# Patient Record
Sex: FEMALE | Race: WHITE | URBAN - METROPOLITAN AREA
[De-identification: names, ages, dates, MRNs, and addresses within clinical notes are randomized per-mention and may not be internally consistent; named-entity substitution may affect disease eponyms.]

---

## 2019-03-21 ENCOUNTER — INPATIENT (INPATIENT)
Facility: HOSPITAL | Age: 84
LOS: 4 days | Discharge: HOME HEALTH | End: 2019-03-26
Attending: FAMILY MEDICINE | Admitting: FAMILY MEDICINE
Payer: MEDICARE

## 2019-03-21 VITALS
TEMPERATURE: 97 F | WEIGHT: 134.92 LBS | DIASTOLIC BLOOD PRESSURE: 83 MMHG | SYSTOLIC BLOOD PRESSURE: 154 MMHG | OXYGEN SATURATION: 95 % | RESPIRATION RATE: 22 BRPM | HEART RATE: 111 BPM

## 2019-03-21 LAB
ALBUMIN SERPL ELPH-MCNC: 3.8 G/DL — SIGNIFICANT CHANGE UP (ref 3.3–5)
ALP SERPL-CCNC: 71 U/L — SIGNIFICANT CHANGE UP (ref 40–120)
ALT FLD-CCNC: 19 U/L — SIGNIFICANT CHANGE UP (ref 12–78)
ANION GAP SERPL CALC-SCNC: 9 MMOL/L — SIGNIFICANT CHANGE UP (ref 5–17)
APPEARANCE UR: CLEAR — SIGNIFICANT CHANGE UP
APTT BLD: 39.8 SEC — HIGH (ref 27.5–36.3)
AST SERPL-CCNC: 22 U/L — SIGNIFICANT CHANGE UP (ref 15–37)
BACTERIA # UR AUTO: ABNORMAL
BASE EXCESS BLDV CALC-SCNC: 3.7 MMOL/L — HIGH (ref -2–2)
BASOPHILS # BLD AUTO: 0.03 K/UL — SIGNIFICANT CHANGE UP (ref 0–0.2)
BASOPHILS NFR BLD AUTO: 0.8 % — SIGNIFICANT CHANGE UP (ref 0–2)
BILIRUB SERPL-MCNC: 1.1 MG/DL — SIGNIFICANT CHANGE UP (ref 0.2–1.2)
BILIRUB UR-MCNC: NEGATIVE — SIGNIFICANT CHANGE UP
BLOOD GAS COMMENTS, VENOUS: SIGNIFICANT CHANGE UP
BUN SERPL-MCNC: 18 MG/DL — SIGNIFICANT CHANGE UP (ref 7–23)
CALCIUM SERPL-MCNC: 8.6 MG/DL — SIGNIFICANT CHANGE UP (ref 8.5–10.1)
CHLORIDE SERPL-SCNC: 103 MMOL/L — SIGNIFICANT CHANGE UP (ref 96–108)
CO2 SERPL-SCNC: 29 MMOL/L — SIGNIFICANT CHANGE UP (ref 22–31)
COLOR SPEC: YELLOW — SIGNIFICANT CHANGE UP
CREAT SERPL-MCNC: 0.66 MG/DL — SIGNIFICANT CHANGE UP (ref 0.5–1.3)
DIFF PNL FLD: ABNORMAL
EOSINOPHIL # BLD AUTO: 0.02 K/UL — SIGNIFICANT CHANGE UP (ref 0–0.5)
EOSINOPHIL NFR BLD AUTO: 0.5 % — SIGNIFICANT CHANGE UP (ref 0–6)
GLUCOSE SERPL-MCNC: 91 MG/DL — SIGNIFICANT CHANGE UP (ref 70–99)
GLUCOSE UR QL: NEGATIVE MG/DL — SIGNIFICANT CHANGE UP
HCO3 BLDV-SCNC: 30 MMOL/L — HIGH (ref 21–29)
HCT VFR BLD CALC: 42.8 % — SIGNIFICANT CHANGE UP (ref 34.5–45)
HGB BLD-MCNC: 13.4 G/DL — SIGNIFICANT CHANGE UP (ref 11.5–15.5)
IMM GRANULOCYTES NFR BLD AUTO: 0.3 % — SIGNIFICANT CHANGE UP (ref 0–1.5)
INR BLD: 2.73 RATIO — HIGH (ref 0.88–1.16)
KETONES UR-MCNC: NEGATIVE — SIGNIFICANT CHANGE UP
LEUKOCYTE ESTERASE UR-ACNC: NEGATIVE — SIGNIFICANT CHANGE UP
LYMPHOCYTES # BLD AUTO: 0.31 K/UL — LOW (ref 1–3.3)
LYMPHOCYTES # BLD AUTO: 8.3 % — LOW (ref 13–44)
MANUAL SMEAR VERIFICATION: SIGNIFICANT CHANGE UP
MCHC RBC-ENTMCNC: 29.4 PG — SIGNIFICANT CHANGE UP (ref 27–34)
MCHC RBC-ENTMCNC: 31.3 GM/DL — LOW (ref 32–36)
MCV RBC AUTO: 93.9 FL — SIGNIFICANT CHANGE UP (ref 80–100)
MONOCYTES # BLD AUTO: 0.42 K/UL — SIGNIFICANT CHANGE UP (ref 0–0.9)
MONOCYTES NFR BLD AUTO: 11.2 % — SIGNIFICANT CHANGE UP (ref 2–14)
NEUTROPHILS # BLD AUTO: 2.96 K/UL — SIGNIFICANT CHANGE UP (ref 1.8–7.4)
NEUTROPHILS NFR BLD AUTO: 78.9 % — HIGH (ref 43–77)
NITRITE UR-MCNC: NEGATIVE — SIGNIFICANT CHANGE UP
NRBC # BLD: 0 /100 WBCS — SIGNIFICANT CHANGE UP (ref 0–0)
NT-PROBNP SERPL-SCNC: 5637 PG/ML — HIGH (ref 0–450)
PCO2 BLDV: 56 MMHG — HIGH (ref 35–50)
PH BLDV: 7.35 — SIGNIFICANT CHANGE UP (ref 7.35–7.45)
PH UR: 7 — SIGNIFICANT CHANGE UP (ref 5–8)
PLAT MORPH BLD: NORMAL — SIGNIFICANT CHANGE UP
PLATELET # BLD AUTO: 123 K/UL — LOW (ref 150–400)
PO2 BLDV: 94 MMHG — HIGH (ref 25–45)
POTASSIUM SERPL-MCNC: 4 MMOL/L — SIGNIFICANT CHANGE UP (ref 3.5–5.3)
POTASSIUM SERPL-SCNC: 4 MMOL/L — SIGNIFICANT CHANGE UP (ref 3.5–5.3)
PROT SERPL-MCNC: 7.3 GM/DL — SIGNIFICANT CHANGE UP (ref 6–8.3)
PROT UR-MCNC: NEGATIVE MG/DL — SIGNIFICANT CHANGE UP
PROTHROM AB SERPL-ACNC: 31.3 SEC — HIGH (ref 10–12.9)
RBC # BLD: 4.56 M/UL — SIGNIFICANT CHANGE UP (ref 3.8–5.2)
RBC # FLD: 13.6 % — SIGNIFICANT CHANGE UP (ref 10.3–14.5)
RBC BLD AUTO: NORMAL — SIGNIFICANT CHANGE UP
RBC CASTS # UR COMP ASSIST: ABNORMAL /HPF (ref 0–4)
SAO2 % BLDV: 96 % — HIGH (ref 67–88)
SODIUM SERPL-SCNC: 141 MMOL/L — SIGNIFICANT CHANGE UP (ref 135–145)
SP GR SPEC: 1 — LOW (ref 1.01–1.02)
TROPONIN I SERPL-MCNC: 0.03 NG/ML — SIGNIFICANT CHANGE UP (ref 0.01–0.04)
UROBILINOGEN FLD QL: NEGATIVE MG/DL — SIGNIFICANT CHANGE UP
WBC # BLD: 3.75 K/UL — LOW (ref 3.8–10.5)
WBC # FLD AUTO: 3.75 K/UL — LOW (ref 3.8–10.5)
WBC UR QL: NEGATIVE — SIGNIFICANT CHANGE UP

## 2019-03-21 PROCEDURE — 71250 CT THORAX DX C-: CPT | Mod: 26

## 2019-03-21 PROCEDURE — 99285 EMERGENCY DEPT VISIT HI MDM: CPT

## 2019-03-21 PROCEDURE — 93010 ELECTROCARDIOGRAM REPORT: CPT

## 2019-03-21 PROCEDURE — 71045 X-RAY EXAM CHEST 1 VIEW: CPT | Mod: 26

## 2019-03-21 RX ORDER — LEVOTHYROXINE SODIUM 125 MCG
112 TABLET ORAL DAILY
Qty: 0 | Refills: 0 | Status: DISCONTINUED | OUTPATIENT
Start: 2019-03-21 | End: 2019-03-26

## 2019-03-21 RX ORDER — ACETAMINOPHEN 500 MG
650 TABLET ORAL EVERY 6 HOURS
Qty: 0 | Refills: 0 | Status: DISCONTINUED | OUTPATIENT
Start: 2019-03-21 | End: 2019-03-26

## 2019-03-21 RX ORDER — POTASSIUM CHLORIDE 20 MEQ
10 PACKET (EA) ORAL DAILY
Qty: 0 | Refills: 0 | Status: DISCONTINUED | OUTPATIENT
Start: 2019-03-21 | End: 2019-03-26

## 2019-03-21 RX ORDER — DILTIAZEM HCL 120 MG
240 CAPSULE, EXT RELEASE 24 HR ORAL DAILY
Qty: 0 | Refills: 0 | Status: DISCONTINUED | OUTPATIENT
Start: 2019-03-21 | End: 2019-03-22

## 2019-03-21 RX ORDER — NITROGLYCERIN 6.5 MG
0.5 CAPSULE, EXTENDED RELEASE ORAL ONCE
Qty: 0 | Refills: 0 | Status: COMPLETED | OUTPATIENT
Start: 2019-03-21 | End: 2019-03-21

## 2019-03-21 RX ORDER — FUROSEMIDE 40 MG
1 TABLET ORAL
Qty: 0 | Refills: 0 | COMMUNITY

## 2019-03-21 RX ORDER — DILTIAZEM HCL 120 MG
1 CAPSULE, EXT RELEASE 24 HR ORAL
Qty: 0 | Refills: 0 | COMMUNITY

## 2019-03-21 RX ORDER — FUROSEMIDE 40 MG
40 TABLET ORAL ONCE
Qty: 0 | Refills: 0 | Status: COMPLETED | OUTPATIENT
Start: 2019-03-21 | End: 2019-03-21

## 2019-03-21 RX ORDER — ASPIRIN/CALCIUM CARB/MAGNESIUM 324 MG
162 TABLET ORAL ONCE
Qty: 0 | Refills: 0 | Status: COMPLETED | OUTPATIENT
Start: 2019-03-21 | End: 2019-03-21

## 2019-03-21 RX ORDER — SENNA PLUS 8.6 MG/1
2 TABLET ORAL AT BEDTIME
Qty: 0 | Refills: 0 | Status: DISCONTINUED | OUTPATIENT
Start: 2019-03-21 | End: 2019-03-26

## 2019-03-21 RX ORDER — SODIUM CHLORIDE 9 MG/ML
3 INJECTION INTRAMUSCULAR; INTRAVENOUS; SUBCUTANEOUS ONCE
Qty: 0 | Refills: 0 | Status: COMPLETED | OUTPATIENT
Start: 2019-03-21 | End: 2019-03-21

## 2019-03-21 RX ORDER — ONDANSETRON 8 MG/1
4 TABLET, FILM COATED ORAL EVERY 4 HOURS
Qty: 0 | Refills: 0 | Status: DISCONTINUED | OUTPATIENT
Start: 2019-03-21 | End: 2019-03-26

## 2019-03-21 RX ORDER — FUROSEMIDE 40 MG
20 TABLET ORAL EVERY 8 HOURS
Qty: 0 | Refills: 0 | Status: DISCONTINUED | OUTPATIENT
Start: 2019-03-21 | End: 2019-03-22

## 2019-03-21 RX ORDER — WARFARIN SODIUM 2.5 MG/1
1 TABLET ORAL
Qty: 0 | Refills: 0 | COMMUNITY

## 2019-03-21 RX ORDER — LISINOPRIL 2.5 MG/1
1 TABLET ORAL
Qty: 0 | Refills: 0 | COMMUNITY

## 2019-03-21 RX ORDER — WARFARIN SODIUM 2.5 MG/1
2 TABLET ORAL DAILY
Qty: 0 | Refills: 0 | Status: DISCONTINUED | OUTPATIENT
Start: 2019-03-21 | End: 2019-03-21

## 2019-03-21 RX ORDER — FUROSEMIDE 40 MG
40 TABLET ORAL ONCE
Qty: 0 | Refills: 0 | Status: DISCONTINUED | OUTPATIENT
Start: 2019-03-21 | End: 2019-03-21

## 2019-03-21 RX ORDER — ATORVASTATIN CALCIUM 80 MG/1
10 TABLET, FILM COATED ORAL AT BEDTIME
Qty: 0 | Refills: 0 | Status: DISCONTINUED | OUTPATIENT
Start: 2019-03-21 | End: 2019-03-26

## 2019-03-21 RX ORDER — LISINOPRIL 2.5 MG/1
10 TABLET ORAL DAILY
Qty: 0 | Refills: 0 | Status: DISCONTINUED | OUTPATIENT
Start: 2019-03-21 | End: 2019-03-22

## 2019-03-21 RX ORDER — DOCUSATE SODIUM 100 MG
100 CAPSULE ORAL
Qty: 0 | Refills: 0 | Status: DISCONTINUED | OUTPATIENT
Start: 2019-03-21 | End: 2019-03-26

## 2019-03-21 RX ADMIN — ATORVASTATIN CALCIUM 10 MILLIGRAM(S): 80 TABLET, FILM COATED ORAL at 21:37

## 2019-03-21 RX ADMIN — SODIUM CHLORIDE 3 MILLILITER(S): 9 INJECTION INTRAMUSCULAR; INTRAVENOUS; SUBCUTANEOUS at 13:30

## 2019-03-21 RX ADMIN — Medication 162 MILLIGRAM(S): at 13:42

## 2019-03-21 RX ADMIN — Medication 20 MILLIGRAM(S): at 21:37

## 2019-03-21 RX ADMIN — Medication 0.5 INCH(S): at 13:42

## 2019-03-21 RX ADMIN — Medication 40 MILLIGRAM(S): at 13:42

## 2019-03-21 NOTE — ED ADULT NURSE REASSESSMENT NOTE - NS ED NURSE REASSESS COMMENT FT1
Pt made aware bed is available and will be transferred after change of shift. Pt resting comfortably.

## 2019-03-21 NOTE — ED PROVIDER NOTE - CARE PLAN
Principal Discharge DX:	Acute on chronic congestive heart failure, unspecified heart failure type  Secondary Diagnosis:	Pleural effusion, bilateral  Secondary Diagnosis:	Hypoxia

## 2019-03-21 NOTE — ED ADULT NURSE REASSESSMENT NOTE - NS ED NURSE REASSESS COMMENT FT1
pt received from previous RN. AOX3, denies pain. vss. POC reviewed with pt and family, both verbalize understanding. bed assigned, will call for report. will continue to monitor

## 2019-03-21 NOTE — ED ADULT TRIAGE NOTE - CHIEF COMPLAINT QUOTE
Pt sent from Dr. Aj's office for afib and CHF exacerbation with weeping pedal edema, pulse ox in MD's office 88%.

## 2019-03-21 NOTE — ED PROVIDER NOTE - CONSTITUTIONAL, MLM
normal... Elderly frail white female. awake, alert, oriented to person, place, day of week. +mild respiratory distress, ill appearing but nontoxic

## 2019-03-21 NOTE — CONSULT NOTE ADULT - ASSESSMENT
ASSESSMENT: This patient presents for evaluation of shortness of breath from our office.  Acute on chronic diastolic heart failure  Shortness of breath  Chronic atrial fibrillation on anticoagulation  Chronic venous insufficiency  Hypertension  Hyperlipidemia  Mild aortic stenosis  Mitral regurgitation  Pulmonary hypertension  Chronic lower ext edema     PLAN:  Continue with diuretics but change to 20mg tid. Consider pulmonary evaluation. Recommend CAT scan w/o contrast  Continue monitor renal function and electrolytes.   Hold warfarin today in case she will require drainage of effusion.  DNR  DVT and GI ppx   d/w Sons at bedside and PCP    Thank you for allowing me to participate in the care of your patient.  Please contact me should any questions arise.     ALFONZO Ayala DO, FACC

## 2019-03-21 NOTE — ED ADULT NURSE REASSESSMENT NOTE - NS ED NURSE REASSESS COMMENT FT1
took over for ANNA MERCER. Pt comfortable in room currently. 3 sons at bedside. pt currently on venti mask at 30% at 97%. no needs at this time. family and pt aware of plan to be admitted.

## 2019-03-21 NOTE — ED ADULT NURSE REASSESSMENT NOTE - NS ED NURSE REASSESS COMMENT FT1
Pt and son (Jose) made aware bed is still pending. Pt ate dinner and transferred to 4 liters nasal cannula-satting 96% denying SOB. Pt sent to cat scan, plan of care discussed.

## 2019-03-21 NOTE — ED PROVIDER NOTE - OBJECTIVE STATEMENT
88 y/o female with a PMHx of CHF, Afib on Warfarin, HTN, HLD, with EF 50% by echo 2015, on 20 mg Lasix BIBA to the ED c/o difficulty breathing x 2 weeks. Pt states that for the past 2 weeks she has had constant SOB throughout the day. SOB is aggravated by exertion. +orthopnea, bilateral pedal edema. Pt was sent in by Dr. Aj. At the office pt had XR done which showed bilateral moderate pleural effusions, unchanged from 3/8/2019. Pt was also 88% O2 sat on room air. Denies fevers, cough, chest pain. Family at bedside states that two weeks ago, when the pt started Lasix she took a double dose of 40mg for two days.

## 2019-03-21 NOTE — ED PROVIDER NOTE - MUSCULOSKELETAL, MLM
Spine appears normal, range of motion is not limited, no muscle or joint tenderness + 2-3 + pitting edema bilateral LE, non tender

## 2019-03-21 NOTE — ED PROVIDER NOTE - CARDIAC, MLM
Normal rate.  Heart sounds S1, S2.  No murmurs, rubs or gallops. Normal radial pulse. +irregularly irregular rhythm

## 2019-03-21 NOTE — PATIENT PROFILE ADULT - FALL HARM RISK
3 trochar sites to abdomen all saturated with serosanguinous drainage. Gauze, abd and medipore applied to trochar sites.   age(85 years old or older)/coagulation(Bleeding disorder R/T clinical cond/anti-coags)

## 2019-03-21 NOTE — H&P ADULT - HISTORY OF PRESENT ILLNESS
89 y/o female with a PMHx of CHF, Afib on Warfarin, HTN, HLD, with EF 50% by echo 2015, on 20 mg Lasix BIBA to the ED c/o difficulty breathing x 2 weeks. Pt states that for the past 2 weeks she has had constant SOB throughout the day. SOB is aggravated by exertion. +orthopnea, bilateral pedal edema. Pt was sent in by Dr. Aj. At the office pt had XR done which showed bilateral moderate pleural effusions, unchanged from 3/8/2019. Pt was also 88% O2 sat on room air. Denies fevers, cough, chest pain. Family at bedside states that two weeks ago, when the pt started Lasix she took a double dose of 40mg for two days.      Vital Signs Last 24 Hrs  T(C): 36.3 (21 Mar 2019 12:23), Max: 36.3 (21 Mar 2019 12:23)  T(F): 97.3 (21 Mar 2019 12:23), Max: 97.3 (21 Mar 2019 12:23)  HR: 81 (21 Mar 2019 14:26) (81 - 111)  BP: 130/79 (21 Mar 2019 14:26) (130/79 - 154/83)  BP(mean): --  RR: 19 (21 Mar 2019 14:26) (19 - 22)  SpO2: 97% (21 Mar 2019 14:26) (95% - 97%)  PHYSICAL EXAM:    Constitutional: NAD, awake and alert, well-developed  HEENT: PERR, EOMI, Normal Hearing, MMM  Neck: Soft and supple, No LAD, No JVD  Respiratory: Breath sounds are clear bilaterally, No wheezing, rales or rhonchi  Cardiovascular: S1 and S2, regular rate and rhythm, no Murmurs, gallops or rubs  Gastrointestinal: Bowel Sounds present, soft, nontender, nondistended, no guarding, no rebound  Extremities: No peripheral edema  Vascular: 2+ peripheral pulses  Neurological: A/O x 3, no focal deficits  Musculoskeletal: 5/5 strength b/l upper and lower extremities  Skin: No rashes    MEDICATIONS  (STANDING):  atorvastatin 10 milliGRAM(s) Oral at bedtime  diltiazem    milliGRAM(s) Oral daily  furosemide   Injectable 40 milliGRAM(s) IV Push once  furosemide   Injectable 20 milliGRAM(s) IV Push every 8 hours  levothyroxine 112 MICROGram(s) Oral daily  lisinopril 10 milliGRAM(s) Oral daily  warfarin 2 milliGRAM(s) Oral daily      LABS: All Labs Reviewed:                        13.4   3.75  )-----------( 123      ( 21 Mar 2019 13:12 )             42.8     03-21    141  |  103  |  18  ----------------------------<  91  4.0   |  29  |  0.66    Ca    8.6      21 Mar 2019 13:12    TPro  7.3  /  Alb  3.8  /  TBili  1.1  /  DBili  x   /  AST  22  /  ALT  19  /  AlkPhos  71  03-21    PT/INR - ( 21 Mar 2019 13:12 )   PT: 31.3 sec;   INR: 2.73 ratio    PTT - ( 21 Mar 2019 13:12 )  PTT:39.8 sec  CARDIAC MARKERS ( 21 Mar 2019 15:53 )  0.032 ng/mL / x     / x     / x     / x      CARDIAC MARKERS ( 21 Mar 2019 13:12 )  0.027 ng/mL / x     / x     / x     / x        Blood Gas Profile - Venous (03.21.19 @ 13:12)    pH, Venous: 7.35    pCO2, Venous: 56 mmHg    pO2, Venous: 94 mmHg    HCO3, Venous: 30 mmol/L    Base Excess, Venous: 3.7 mmol/L    Oxygen Saturation, Venous: 96 %    Blood Gas Comments, Venous: Sp. Instructions_Additional Info: Venti mask    Serum Pro-Brain Natriuretic Peptide (03.21.19 @ 13:12)    Serum Pro-Brain Natriuretic Peptide: 5637 pg/mL      EKG: reviewed A fib.     Xray Chest 1 View AP/PA. (03.21.19 @ 13:49) >  IMPRESSION: Congestive heart failure with moderate pleural effusions.      ASSESSMENT AND PLAN: This is a pleasant 88 y/o female with a PMHx of CHF preserved EF (55% 12/18 on ECHO), Afib on Warfarin who was sent to the ED from cardiologist's office for worsening dyspnea found to be hypoxic 88% on RA. Patient had been suffering from progressive dyspnea worsened with exertion, orthopnea and LE edema for the past several weeks. She started new Lasix 2 weeks ago with alternating doses without any improvement. No reports of fevers/ chills/ chest pain or congestion. Outpatient CXR revealed bilateral moderate pleural effusions.     In the ED, notable labs include elevated ProBNP 8K, initial trops X 2 normal and EKG with A. fib rate controlled. CXR revealed CHF with moderate effusions.   Seen with family at bedside, dyspneic with prolonged conversation requiring ventimask for oxygenation sating 93% on 30% supplemental O2.     ROS: stated above in HPI.     PMH:   - Atrial fibrillation  - Diastolic Dysfunction CHF  - Hypothyroidism  - HTN  -HLD    PSHx: unknown  Allergies: bactrim but tolerates sulfa drugs.   Social Hx: lives at home with son who is HCP. Uses walker for ambulation. Nonsmoker, no ETOH or illicit drug use.  Family Hx: HTN  Meds; reviewed.     Vital Signs Last 24 Hrs  T(C): 36.3 (21 Mar 2019 12:23), Max: 36.3 (21 Mar 2019 12:23)  T(F): 97.3 (21 Mar 2019 12:23), Max: 97.3 (21 Mar 2019 12:23)  HR: 81 (21 Mar 2019 14:26) (81 - 111)  BP: 130/79 (21 Mar 2019 14:26) (130/79 - 154/83)  BP(mean): --  RR: 19 (21 Mar 2019 14:26) (19 - 22)  SpO2: 97% (21 Mar 2019 14:26) (95% - 97%)    PHYSICAL EXAM:  Constitutional: frail elderly female awake and alert in moderate respiratory distress speaking in short sentences.   HEENT: PERR, EOMI, Mildly hard of Hearing, MMM on ventimask.   Neck: Soft and supple, No LAD, No JVD  Respiratory: decreased breath sounds on bases, no wheezing, rhonchi.   Cardiovascular: S1 and S2, regular rate and irregularly irregular rhythm.  Gastrointestinal: Bowel Sounds present, soft, nontender, nondistended, no guarding, no rebound  Extremities: +2 LE edema to mid calves, no calf tenderness.   Vascular: 2+ peripheral pulses  Neurological: A/O x 3, no focal deficits  Musculoskeletal: 5/5 strength b/l upper and lower extremities  Skin: No rashes    MEDICATIONS  (STANDING):  atorvastatin 10 milliGRAM(s) Oral at bedtime  diltiazem    milliGRAM(s) Oral daily  furosemide   Injectable 40 milliGRAM(s) IV Push once  furosemide   Injectable 20 milliGRAM(s) IV Push every 8 hours  levothyroxine 112 MICROGram(s) Oral daily  lisinopril 10 milliGRAM(s) Oral daily  warfarin 2 milliGRAM(s) Oral daily      LABS: All Labs Reviewed:                        13.4   3.75  )-----------( 123      ( 21 Mar 2019 13:12 )             42.8     03-21    141  |  103  |  18  ----------------------------<  91  4.0   |  29  |  0.66    Ca    8.6      21 Mar 2019 13:12    TPro  7.3  /  Alb  3.8  /  TBili  1.1  /  DBili  x   /  AST  22  /  ALT  19  /  AlkPhos  71  03-21  PT/INR - ( 21 Mar 2019 13:12 )   PT: 31.3 sec;   INR: 2.73 ratio    PTT - ( 21 Mar 2019 13:12 )  PTT:39.8 sec  CARDIAC MARKERS ( 21 Mar 2019 15:53 )  0.032 ng/mL / x     / x     / x     / x      CARDIAC MARKERS ( 21 Mar 2019 13:12 )  0.027 ng/mL / x     / x     / x     / x        Blood Gas Profile - Venous (03.21.19 @ 13:12)    pH, Venous: 7.35    pCO2, Venous: 56 mmHg    pO2, Venous: 94 mmHg    HCO3, Venous: 30 mmol/L    Base Excess, Venous: 3.7 mmol/L    Oxygen Saturation, Venous: 96 %    Blood Gas Comments, Venous: Sp. Instructions Additional Info: Venti mask    Serum Pro-Brain Natriuretic Peptide (03.21.19 @ 13:12)    Serum Pro-Brain Natriuretic Peptide: 5637 pg/mL    EKG: reviewed A fib.     Xray Chest 1 View AP/PA. (03.21.19 @ 13:49) >  IMPRESSION: Congestive heart failure with moderate pleural effusions.      ASSESSMENT AND PLAN:     This is a pleasant 88 y/o female with a PMHx of CHF preserved EF (55% 12/18 on ECHO), Afib on Warfarin admitted for acute hypoxic respiratory failure secondary to acute on chronic diastolic CHF exacerbation:     # Acute Hypoxic Respiratory Failure  # Acute on Chronic Diastolic CHF Exacerbation - preserved EF  # Bilateral Moderate Pleural effusions  - patient to undergo IV diuresis with IV Lasix 20mg q8hrs / monitor daily weights and I's/O's.   - fluid restrict to 1.5L  - ECHO done 3 months ago, moderate pulmonary HTN noted, severe LA dilatation.   - check CT Chest to eval size of pleural effusions.   - no signs of sepsis.  - wean supplemental O2 off, not on chronic home O2.   - fall precautions and PT needed.    # Chronic A. fib - currently rate controlled on Diltiazem.   - INR therapeutic, continue Coumadin 2mg QHS.   - on statin.    # Hypothyroidism - continue home does Synthroid.     # Suspected Severe protein calorie malnutrition - consult nutrition.   - denies dysphagia issues.     DVT ppx: therapeutic on coumadin  Dispo: admit to telemetry for further monitoring.     Case discussed in depth with sons, patient and Cardiologist.   Code Status: spent additional 18mins discussing advanced goals of care and patient would like to be DNR/DNI.

## 2019-03-21 NOTE — CONSULT NOTE ADULT - SUBJECTIVE AND OBJECTIVE BOX
Ms. Martinez is a 89 y.o. female with a PMHx of chronic atrial fibrillation on anticoagulation warfarin, hypertension, hyperlipidemia, moderate mitral valve regurgitation, and moderate aortic stenosis, pulmonary hypertension and chronic diastolic heart failure.  She was last seen for worsening dyspnea and her diuretic therapy was suggested after she was noted to have a small pleural effusion on chest x-ray.  The patient was seen in our office today for shortness of breath, despite up titration of her diuretic therapy. Chest x-ray performed at our office showed increasing pleural effusions bilaterally.  Given hypoxia and shortness of breath during her office visit she was sent to the ER.  She was Dx with CHF on admission.  She admits for orthopnea but denied PND. No palpitations or chest pain. No fever or chills.    Thus far, workup revealed evidence of hypercarbia, with increased pleural effusions on chest x-ray. BNP has been elevated.     Outpatient cardiac medication: Warfarin, diltiazem, Lasix, pravastatin, lisinopril, potassium supplementation     Last Echo:12/2018- EF 50%, severe left atrial dilatation, LVH, asymmetric apical hypertrophy, mild aortic regurgitation, moderate mitral regurgitation, mild to moderate aortic stenosis, KARISHMA 1.48cm, mild AI, moderate pulmonary hypertension, small pericardial effusion  Last Stress Test: Patient states that she does not want to undergo the recommended stress test.  Last Carotid: 11/6/18 non obstructive carotid artery disease.    Past medical history: As above. Chronic atrial fibrillation on anticoagulation warfarin, hypertension, hyperlipidemia, history of hypothyroidism, history of basal arsenal of the nose  Past surgical history: Surgery for skin cancer  Social history:  She denies any history of tobacco abuse, alcohol abuse or illicit drug use.  Family History:  Not significant for premature coronary artery disease or sudden cardiac death. Significant for family history of lung cancer.    ROS:  A 12 point of system was obtained, which is negative except for what is mentioned in the above HPI.       Vital Signs Last 24 Hrs  T(C): 36.7 (21 Mar 2019 17:13), Max: 36.7 (21 Mar 2019 17:13)  T(F): 98.1 (21 Mar 2019 17:13), Max: 98.1 (21 Mar 2019 17:13)  HR: 68 (21 Mar 2019 17:13) (68 - 111)  BP: 132/76 (21 Mar 2019 17:13) (130/79 - 154/83)  BP(mean): --  RR: 24 (21 Mar 2019 17:13) (19 - 24)  SpO2: 95% (21 Mar 2019 17:13) (95% - 97%)    Physical Exam  GENERAL APPEARANCE:  No acute distress/frail  HEAD: normocephalic, atraumatic  NECK: supple, no jugular venous distention, no carotid bruit     HEART:  irregularly irregular, S1, S2 normal 2/6 sysolic murmur     CHEST:  No anterior chest wall tenderness     LUNGS:  crackles with decrease breath sounds at bases BL  ABDOMEN soft, nontender, nondistended, with positive bowel sounds appreciated  EXTREMITIES: no clubbing, cyanosis, w/ weeping edema  NEURO:  Alert and oriented x3  PSYC:  Normal affect  SKIN:  Dry and chronic changes in lower Ext         MEDICATIONS  (STANDING):  atorvastatin 10 milliGRAM(s) Oral at bedtime  diltiazem    milliGRAM(s) Oral daily  furosemide   Injectable 40 milliGRAM(s) IV Push once  furosemide   Injectable 20 milliGRAM(s) IV Push every 8 hours  levothyroxine 112 MICROGram(s) Oral daily  lisinopril 10 milliGRAM(s) Oral daily  warfarin 2 milliGRAM(s) Oral daily      LABS: All Labs Reviewed:                        13.4   3.75  )-----------( 123      ( 21 Mar 2019 13:12 )             42.8     03-21    141  |  103  |  18  ----------------------------<  91  4.0   |  29  |  0.66    Ca    8.6      21 Mar 2019 13:12    TPro  7.3  /  Alb  3.8  /  TBili  1.1  /  DBili  x   /  AST  22  /  ALT  19  /  AlkPhos  71  03-21  PT/INR - ( 21 Mar 2019 13:12 )   PT: 31.3 sec;   INR: 2.73 ratio    PTT - ( 21 Mar 2019 13:12 )  PTT:39.8 sec  CARDIAC MARKERS ( 21 Mar 2019 15:53 )  0.032 ng/mL / x     / x     / x     / x      CARDIAC MARKERS ( 21 Mar 2019 13:12 )  0.027 ng/mL / x     / x     / x     / x        Blood Gas Profile - Venous (03.21.19 @ 13:12)    pH, Venous: 7.35    pCO2, Venous: 56 mmHg    pO2, Venous: 94 mmHg    HCO3, Venous: 30 mmol/L    Base Excess, Venous: 3.7 mmol/L    Oxygen Saturation, Venous: 96 %    Blood Gas Comments, Venous: Sp. Instructions Additional Info: Venti mask    Serum Pro-Brain Natriuretic Peptide (03.21.19 @ 13:12)    Serum Pro-Brain Natriuretic Peptide: 5637 pg/mL    EKG: Per my interpretation, atrial fibrillation with a moderate ventricular response at 103 bpm. Normal axis. Nonspecific ST-T wave changes. Voltage criteria consistent with LVH.        RADIOLOGY & ADDITIONAL STUDIES: Aortic Ca and BL effusions on CXR

## 2019-03-21 NOTE — ED ADULT NURSE NOTE - OBJECTIVE STATEMENT
Patient sent from PMD for low pulse ox readings. CHF exacerbation with pedal edema. Right LE appears to be weeping. Patient admits SOB on exertion and the need to sleep while sitting up to avoid shortness of breath.

## 2019-03-21 NOTE — ED PROVIDER NOTE - CLINICAL SUMMARY MEDICAL DECISION MAKING FREE TEXT BOX
88 y/o female, hx of COPD, CHF, Afib on coumadin, BIBA from cardio md office regarding 2+ weeks SOB, orthopnea, not responding to outpatient therapy, CXR today bilateral pleural effusions. Pt hypoxic 88 percent in md office. sent to ED for further eval and management of CHF. Plan EKG, CXR, labs including blood cultures, BNP, serial troponin, supplemental O2, IV Lasix, topical NTG, PO ASA, monitor, observe, reassess, will require admission.

## 2019-03-21 NOTE — ED ADULT NURSE REASSESSMENT NOTE - NS ED NURSE REASSESS COMMENT FT1
Report received from ANNA Garrido. Pt received alert and orientedx3 with moments of confusion. Pt on 30% venti satting 97%. Pt states "I feel about the same as when I came in". Medications discussed-per sons at bedside, pt took all daily meds prior to arrival. Dinner ordered and plan of care discussed. Pt made aware she will be transferred to a bed when it becomes available. Hourly rounding performed, pt resting comfortably.

## 2019-03-22 LAB
ANION GAP SERPL CALC-SCNC: 8 MMOL/L — SIGNIFICANT CHANGE UP (ref 5–17)
BUN SERPL-MCNC: 18 MG/DL — SIGNIFICANT CHANGE UP (ref 7–23)
CALCIUM SERPL-MCNC: 8.1 MG/DL — LOW (ref 8.5–10.1)
CHLORIDE SERPL-SCNC: 104 MMOL/L — SIGNIFICANT CHANGE UP (ref 96–108)
CO2 SERPL-SCNC: 32 MMOL/L — HIGH (ref 22–31)
CREAT SERPL-MCNC: 0.59 MG/DL — SIGNIFICANT CHANGE UP (ref 0.5–1.3)
GLUCOSE SERPL-MCNC: 81 MG/DL — SIGNIFICANT CHANGE UP (ref 70–99)
INR BLD: 2.68 RATIO — HIGH (ref 0.88–1.16)
POTASSIUM SERPL-MCNC: 3.3 MMOL/L — LOW (ref 3.5–5.3)
POTASSIUM SERPL-SCNC: 3.3 MMOL/L — LOW (ref 3.5–5.3)
PROTHROM AB SERPL-ACNC: 30.7 SEC — HIGH (ref 10–12.9)
SODIUM SERPL-SCNC: 144 MMOL/L — SIGNIFICANT CHANGE UP (ref 135–145)

## 2019-03-22 RX ORDER — LISINOPRIL 2.5 MG/1
1 TABLET ORAL
Qty: 0 | Refills: 0 | COMMUNITY

## 2019-03-22 RX ORDER — POTASSIUM CHLORIDE 20 MEQ
40 PACKET (EA) ORAL DAILY
Qty: 0 | Refills: 0 | Status: DISCONTINUED | OUTPATIENT
Start: 2019-03-22 | End: 2019-03-22

## 2019-03-22 RX ORDER — LISINOPRIL 2.5 MG/1
1 TABLET ORAL
Qty: 30 | Refills: 0
Start: 2019-03-22

## 2019-03-22 RX ORDER — LOPERAMIDE HCL 2 MG
2 TABLET ORAL
Qty: 0 | Refills: 0 | Status: DISCONTINUED | OUTPATIENT
Start: 2019-03-22 | End: 2019-03-26

## 2019-03-22 RX ORDER — LISINOPRIL 2.5 MG/1
5 TABLET ORAL DAILY
Qty: 0 | Refills: 0 | Status: DISCONTINUED | OUTPATIENT
Start: 2019-03-22 | End: 2019-03-26

## 2019-03-22 RX ORDER — WARFARIN SODIUM 2.5 MG/1
2 TABLET ORAL AT BEDTIME
Qty: 0 | Refills: 0 | Status: DISCONTINUED | OUTPATIENT
Start: 2019-03-22 | End: 2019-03-23

## 2019-03-22 RX ORDER — DILTIAZEM HCL 120 MG
180 CAPSULE, EXT RELEASE 24 HR ORAL DAILY
Qty: 0 | Refills: 0 | Status: DISCONTINUED | OUTPATIENT
Start: 2019-03-22 | End: 2019-03-26

## 2019-03-22 RX ORDER — FUROSEMIDE 40 MG
20 TABLET ORAL
Qty: 0 | Refills: 0 | Status: DISCONTINUED | OUTPATIENT
Start: 2019-03-22 | End: 2019-03-23

## 2019-03-22 RX ORDER — POTASSIUM CHLORIDE 20 MEQ
20 PACKET (EA) ORAL
Qty: 0 | Refills: 0 | Status: COMPLETED | OUTPATIENT
Start: 2019-03-22 | End: 2019-03-22

## 2019-03-22 RX ORDER — DILTIAZEM HCL 120 MG
1 CAPSULE, EXT RELEASE 24 HR ORAL
Qty: 30 | Refills: 0
Start: 2019-03-22

## 2019-03-22 RX ADMIN — Medication 20 MILLIEQUIVALENT(S): at 13:20

## 2019-03-22 RX ADMIN — Medication 112 MICROGRAM(S): at 05:37

## 2019-03-22 RX ADMIN — Medication 10 MILLIEQUIVALENT(S): at 11:34

## 2019-03-22 RX ADMIN — LISINOPRIL 10 MILLIGRAM(S): 2.5 TABLET ORAL at 05:37

## 2019-03-22 RX ADMIN — Medication 20 MILLIGRAM(S): at 21:00

## 2019-03-22 RX ADMIN — Medication 20 MILLIEQUIVALENT(S): at 11:36

## 2019-03-22 RX ADMIN — Medication 240 MILLIGRAM(S): at 05:37

## 2019-03-22 RX ADMIN — ATORVASTATIN CALCIUM 10 MILLIGRAM(S): 80 TABLET, FILM COATED ORAL at 21:00

## 2019-03-22 RX ADMIN — Medication 20 MILLIGRAM(S): at 05:37

## 2019-03-22 RX ADMIN — WARFARIN SODIUM 2 MILLIGRAM(S): 2.5 TABLET ORAL at 21:00

## 2019-03-22 NOTE — PHYSICAL THERAPY INITIAL EVALUATION ADULT - DIAGNOSIS, PT EVAL
Acute Hypoxic Respiratory Failure:Acute on Chronic Diastolic CHF Exacerbation - preserved EF: Bilateral Moderate Pleural effusions

## 2019-03-22 NOTE — PHYSICAL THERAPY INITIAL EVALUATION ADULT - PERTINENT HX OF CURRENT PROBLEM, REHAB EVAL
sent to the ED from cardiologist's office for worsening dyspnea found to be hypoxic 88% on RA. Patient had been suffering from progressive dyspnea worsened with exertion, orthopnea and LE edema for the past several weeks. She started new Lasix 2 weeks ago with alternating doses without any improvement.Outpatient CXR revealed bilateral moderate pleural effusions.

## 2019-03-22 NOTE — PHYSICAL THERAPY INITIAL EVALUATION ADULT - ADDITIONAL COMMENTS
Lives with son in ranch style home. 3 SYLVESTER with 1 HR. No O2 at home. uses rollater. does light cooking and her own laundry. son provides transport to MD and errands. Spongebaths on her own.

## 2019-03-22 NOTE — CONSULT NOTE ADULT - ASSESSMENT
1) Bilateral Pleural Effusion  2) ADHF  3) Abnormal CT Chest  4) Dyspnea  88 y/o female with a PMHx of CHF preserved EF (55% 12/18 on ECHO), Afib on Warfarin who was sent to the ED from cardiologist's office for worsening dyspnea found to be hypoxic 88% on RA.  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

## 2019-03-22 NOTE — CONSULT NOTE ADULT - SUBJECTIVE AND OBJECTIVE BOX
Ms. Martinez is a 89 y.o. female with a PMHx of chronic atrial fibrillation on anticoagulation warfarin, hypertension, hyperlipidemia, moderate mitral valve regurgitation, pulmonary hypertension and chronic diastolic heart failure.  She was last seen for worsening dyspnea and her diuretic therapy was suggested after she was noted to have a small pleural effusion on chest x-ray.  The patient was seen in our office today for shortness of breath, despite up titration of her diuretic therapy. Chest x-ray performed at our office showed increasing pleural effusions bilaterally.  Given hypoxia and shortness of breath during her office visit she was sent to the ER.  Thus far, workup revealed evidence of hypercarbia, with increased pleural effusions on chest x-ray. BNP has been elevated.  The patient was diuresed with improvement in symptoms.    Outpatient cardiac medication: Warfarin, diltiazem, Lasix, pravastatin, lisinopril, potassium supplementation    Last Echo:2018- EF 50%, severe left atrial dilatation, LVH, asymmetric apical hypertrophy, mild aortic regurgitation, moderate mitral regurgitation, mild to moderate aortic stenosis, KARISHMA 1.48cm, mild AI, moderate pulmonary hypertension, small pericardial effusion    Last Stress Test: Patient states that she does not want to undergo the recommended stress test.    Last Carotid: 18 non obstructive carotid artery disease.    Past medical history: Chronic atrial fibrillation on anticoagulation warfarin, hypertension, hyperlipidemia, history of hypothyroidism, history of basal arsenal of the nose    Past surgical history: Surgery for skin cancer    Social history:  She denies any history of tobacco abuse, alcohol abuse or illicit drug use.    Family History:  Not significant for premature coronary artery disease or sudden cardiac death. Significant for family history of lung cancer.    ROS:  A 12 point of system was obtained, which is negative except for what is mentioned in the above HPI.         ________________________________  REVIEW OF SYSTEMS  A 10 point review of system has been performed, and is negative except for what has been mentioned in the above history of present illness.     MEDICATIONS  (STANDING):  atorvastatin 10 milliGRAM(s) Oral at bedtime  diltiazem    milliGRAM(s) Oral daily  furosemide   Injectable 20 milliGRAM(s) IV Push every 8 hours  levothyroxine 112 MICROGram(s) Oral daily  lisinopril 10 milliGRAM(s) Oral daily  potassium chloride    Tablet ER 10 milliEquivalent(s) Oral daily  potassium chloride    Tablet ER 20 milliEquivalent(s) Oral every 2 hours    MEDICATIONS  (PRN):  acetaminophen   Tablet .. 650 milliGRAM(s) Oral every 6 hours PRN Mild Pain (1 - 3)  docusate sodium 100 milliGRAM(s) Oral two times a day PRN Constipation  ondansetron Injectable 4 milliGRAM(s) IV Push every 4 hours PRN Nausea and/or Vomiting  senna 2 Tablet(s) Oral at bedtime PRN Constipation    ________________________________  PHYSICAL EXAM:  Vital Signs Last 24 Hrs  T(C): 36.4 (22 Mar 2019 11:08), Max: 36.7 (21 Mar 2019 17:13)  T(F): 97.5 (22 Mar 2019 11:08), Max: 98.1 (21 Mar 2019 17:13)  HR: 55 (22 Mar 2019 11:08) (55 - 111)  BP: 87/43 (22 Mar 2019 11:08) (87/43 - 154/83)  BP(mean): --  RR: 19 (22 Mar 2019 11:08) (19 - 24)  SpO2: 99% (22 Mar 2019 11:08) (91% - 99%)  I&O's Summary    GENERAL APPEARANCE:  No acute distress  HEAD: normocephalic, atraumatic  NECK: supple, no jugular venous distention, no carotid bruit  HEART:  irregularly irregular, S1, S2 normal 1/6 sysolic murmur  CHEST:  No anterior chest wall tenderness  LUNGS:  crackles with decrease breath sounds  ABDOMEN soft, nontender, nondistended, with positive bowel sounds appreciated  EXTREMITIES: no clubbing, cyanosis, LE edema and chronic changes  NEURO:  Alert and oriented x3  PSYC:  Normal affect  SKIN:  Dry    ________________________________  TELEMETRY: Afib w/ mod vent response and bradycardia         LABS:                        13.4   3.75  )-----------( 123      ( 21 Mar 2019 13:12 )             42.8             03-22    144  |  104  |  18  ----------------------------<  81  3.3<L>   |  32<H>  |  0.59    Ca    8.1<L>      22 Mar 2019 05:26    TPro  7.3  /  Alb  3.8  /  TBili  1.1  /  DBili  x   /  AST  22  /  ALT  19  /  AlkPhos  71   @ 18:54  Trop-I  0.027  CK      --  CK-MB   --     @ 15:53  Trop-I  0.032  CK      --  CK-MB   --     @ 13:12  Trop-I  0.027  CK      --  CK-MB   --    Pro BNP  5637  @ 13:12  D Dimer  --  @ 13:12    PT/INR - ( 22 Mar 2019 05:26 )   PT: 30.7 sec;   INR: 2.68 ratio         PTT - ( 21 Mar 2019 13:12 )  PTT:39.8 sec  Urinalysis Basic - ( 21 Mar 2019 13:45 )    Color: Yellow / Appearance: Clear / S.005 / pH: x  Gluc: x / Ketone: Negative  / Bili: Negative / Urobili: Negative mg/dL   Blood: x / Protein: Negative mg/dL / Nitrite: Negative   Leuk Esterase: Negative / RBC: 3-5 /HPF / WBC Negative   Sq Epi: x / Non Sq Epi: x / Bacteria: Occasional    EKG: Per my interpretation, atrial fibrillation with a moderate ventricular response at 103 bpm. Normal axis. Nonspecific ST-T wave changes. Voltage criteria consistent with LVH.        ________________________________    RADIOLOGY & ADDITIONAL STUDIES:  ________________________________ FOLLOW UP NOTE     Ms. Martinez is a 89 y.o. female with a PMHx of chronic atrial fibrillation on anticoagulation warfarin, hypertension, hyperlipidemia, moderate mitral valve regurgitation, pulmonary hypertension and chronic diastolic heart failure.  She was last seen for worsening dyspnea and her diuretic therapy was suggested after she was noted to have a small pleural effusion on chest x-ray.  The patient was seen in our office today for shortness of breath, despite up titration of her diuretic therapy. Chest x-ray performed at our office showed increasing pleural effusions bilaterally.  Given hypoxia and shortness of breath during her office visit she was sent to the ER.  Thus far, workup revealed evidence of hypercarbia, with increased pleural effusions on chest x-ray. BNP has been elevated.  The patient was diuresed with improvement in symptoms.    Outpatient cardiac medication: Warfarin, diltiazem, Lasix, pravastatin, lisinopril, potassium supplementation    Last Echo:2018- EF 50%, severe left atrial dilatation, LVH, asymmetric apical hypertrophy, mild aortic regurgitation, moderate mitral regurgitation, mild to moderate aortic stenosis, KARISHMA 1.48cm, mild AI, moderate pulmonary hypertension, small pericardial effusion    Last Stress Test: Patient states that she does not want to undergo the recommended stress test.    Last Carotid: 18 non obstructive carotid artery disease.    Past medical history: Chronic atrial fibrillation on anticoagulation warfarin, hypertension, hyperlipidemia, history of hypothyroidism, history of basal arsenal of the nose    Past surgical history: Surgery for skin cancer    Social history:  She denies any history of tobacco abuse, alcohol abuse or illicit drug use.    Family History:  Not significant for premature coronary artery disease or sudden cardiac death. Significant for family history of lung cancer.    ROS:  A 12 point of system was obtained, which is negative except for what is mentioned in the above HPI.         ________________________________  REVIEW OF SYSTEMS  A 10 point review of system has been performed, and is negative except for what has been mentioned in the above history of present illness.     MEDICATIONS  (STANDING):  atorvastatin 10 milliGRAM(s) Oral at bedtime  diltiazem    milliGRAM(s) Oral daily  furosemide   Injectable 20 milliGRAM(s) IV Push every 8 hours  levothyroxine 112 MICROGram(s) Oral daily  lisinopril 10 milliGRAM(s) Oral daily  potassium chloride    Tablet ER 10 milliEquivalent(s) Oral daily  potassium chloride    Tablet ER 20 milliEquivalent(s) Oral every 2 hours    MEDICATIONS  (PRN):  acetaminophen   Tablet .. 650 milliGRAM(s) Oral every 6 hours PRN Mild Pain (1 - 3)  docusate sodium 100 milliGRAM(s) Oral two times a day PRN Constipation  ondansetron Injectable 4 milliGRAM(s) IV Push every 4 hours PRN Nausea and/or Vomiting  senna 2 Tablet(s) Oral at bedtime PRN Constipation    ________________________________  PHYSICAL EXAM:  Vital Signs Last 24 Hrs  T(C): 36.4 (22 Mar 2019 11:08), Max: 36.7 (21 Mar 2019 17:13)  T(F): 97.5 (22 Mar 2019 11:08), Max: 98.1 (21 Mar 2019 17:13)  HR: 55 (22 Mar 2019 11:08) (55 - 111)  BP: 87/43 (22 Mar 2019 11:08) (87/43 - 154/83)  BP(mean): --  RR: 19 (22 Mar 2019 11:08) (19 - 24)  SpO2: 99% (22 Mar 2019 11:08) (91% - 99%)  I&O's Summary    GENERAL APPEARANCE:  No acute distress  HEAD: normocephalic, atraumatic  NECK: supple, no jugular venous distention, no carotid bruit  HEART:  irregularly irregular, S1, S2 normal 1/6 sysolic murmur  CHEST:  No anterior chest wall tenderness  LUNGS:  crackles with decrease breath sounds  ABDOMEN soft, nontender, nondistended, with positive bowel sounds appreciated  EXTREMITIES: no clubbing, cyanosis, LE edema and chronic changes  NEURO:  Alert and oriented x3  PSYC:  Normal affect  SKIN:  Dry    ________________________________  TELEMETRY: Afib w/ mod vent response and bradycardia         LABS:                        13.4   3.75  )-----------( 123      ( 21 Mar 2019 13:12 )             42.8             03-22    144  |  104  |  18  ----------------------------<  81  3.3<L>   |  32<H>  |  0.59    Ca    8.1<L>      22 Mar 2019 05:26    TPro  7.3  /  Alb  3.8  /  TBili  1.1  /  DBili  x   /  AST  22  /  ALT  19  /  AlkPhos  71   @ 18:54  Trop-I  0.027  CK      --  CK-MB   --     @ 15:53  Trop-I  0.032  CK      --  CK-MB   --     @ 13:12  Trop-I  0.027  CK      --  CK-MB   --    Pro BNP  5637  @ 13:12  D Dimer  --  @ 13:12    PT/INR - ( 22 Mar 2019 05:26 )   PT: 30.7 sec;   INR: 2.68 ratio         PTT - ( 21 Mar 2019 13:12 )  PTT:39.8 sec  Urinalysis Basic - ( 21 Mar 2019 13:45 )    Color: Yellow / Appearance: Clear / S.005 / pH: x  Gluc: x / Ketone: Negative  / Bili: Negative / Urobili: Negative mg/dL   Blood: x / Protein: Negative mg/dL / Nitrite: Negative   Leuk Esterase: Negative / RBC: 3-5 /HPF / WBC Negative   Sq Epi: x / Non Sq Epi: x / Bacteria: Occasional    EKG: Per my interpretation, atrial fibrillation with a moderate ventricular response at 103 bpm. Normal axis. Nonspecific ST-T wave changes. Voltage criteria consistent with LVH.        ________________________________    RADIOLOGY & ADDITIONAL STUDIES:  ________________________________

## 2019-03-22 NOTE — PHYSICAL THERAPY INITIAL EVALUATION ADULT - GENERAL OBSERVATIONS, REHAB EVAL
Pre and post session: seated in chair with chair alarm on. O2 via nc in place and monitor on. call bell and phone in reach. Sons present. No c/o pain. Tolerated well and would benefit from further skilled PT for functional mobility training.

## 2019-03-22 NOTE — CONSULT NOTE ADULT - SUBJECTIVE AND OBJECTIVE BOX
Patient is a 89y old  Female who presents with a chief complaint of Worsening SOB, sent in from Cardiology office for hypoxia. (21 Mar 2019 17:48)      HPI:  This is a pleasant 89 y/o female with a PMHx of CHF preserved EF (55%  on ECHO), Afib on Warfarin who was sent to the ED from cardiologist's office for worsening dyspnea found to be hypoxic 88% on RA. Patient had been suffering from progressive dyspnea worsened with exertion, orthopnea and LE edema for the past several weeks. She started new Lasix 2 weeks ago with alternating doses without any improvement. No reports of fevers/ chills/ chest pain or congestion. Outpatient CXR revealed bilateral moderate pleural effusions.   In the ED, notable labs include elevated ProBNP 8K, initial trops X 2 normal and EKG with A. fib rate controlled. CXR revealed CHF with moderate effusions.   Seen with family at bedside, dyspneic with prolonged conversation requiring ventimask for oxygenation sating 93% on 30% supplemental O2.   Aforementioned history per chart review          PAST MEDICAL & SURGICAL HISTORY:  Hypothyroid  Hyperlipidemia  HTN (hypertension)  AF (atrial fibrillation)      PREVIOUS DIAGNOSTIC TESTING:      MEDICATIONS  (STANDING):  atorvastatin 10 milliGRAM(s) Oral at bedtime  diltiazem    milliGRAM(s) Oral daily  furosemide   Injectable 20 milliGRAM(s) IV Push every 8 hours  levothyroxine 112 MICROGram(s) Oral daily  lisinopril 10 milliGRAM(s) Oral daily  potassium chloride    Tablet ER 10 milliEquivalent(s) Oral daily  potassium chloride    Tablet ER 20 milliEquivalent(s) Oral every 2 hours    MEDICATIONS  (PRN):  acetaminophen   Tablet .. 650 milliGRAM(s) Oral every 6 hours PRN Mild Pain (1 - 3)  docusate sodium 100 milliGRAM(s) Oral two times a day PRN Constipation  ondansetron Injectable 4 milliGRAM(s) IV Push every 4 hours PRN Nausea and/or Vomiting  senna 2 Tablet(s) Oral at bedtime PRN Constipation      FAMILY HISTORY:      SOCIAL HISTORY:  ***    REVIEW OF SYSTEM:  Dyspnea, improving, otherwise 12 point ROS Negative     Vital Signs Last 24 Hrs  T(C): 36.3 (22 Mar 2019 04:45), Max: 36.7 (21 Mar 2019 17:13)  T(F): 97.3 (22 Mar 2019 04:45), Max: 98.1 (21 Mar 2019 17:13)  HR: 70 (22 Mar 2019 04:45) (68 - 111)  BP: 120/41 (22 Mar 2019 04:45) (120/41 - 154/83)  BP(mean): --  RR: 20 (22 Mar 2019 04:45) (19 - 24)  SpO2: 91% (22 Mar 2019 04:45) (91% - 97%)    I&O's Summary    PHYSICAL EXAM  General Appearance: cooperative, no acute distress,   HEENT: PERRL, conjunctiva clear, EOM's intact, non injected pharynx, no exudate, TM   normal  Neck: Supple, , no adenopathy, thyroid: not enlarged, no carotid bruit or JVD  Back: Symmetric, no  tenderness,no soft tissue tenderness  Lungs: Diminished in the lower lobes bilaterally R>L  Heart: Regular rate and rhythm, S1, S2 normal, no murmur, rub or gallop  Abdomen: Soft, non-tender, bowel sounds active , no hepatosplenomegaly  Extremities: no cyanosis or edema, no joint swelling  Skin: Skin color, texture normal, no rashes   Neurologic: Alert and oriented X3 , cranial nerves intact, sensory and motor normal,    ECG:    LABS:                          13.4   3.75  )-----------( 123      ( 21 Mar 2019 13:12 )             42.8     22    144  |  104  |  18  ----------------------------<  81  3.3<L>   |  32<H>  |  0.59    Ca    8.1<L>      22 Mar 2019 05:26    TPro  7.3  /  Alb  3.8  /  TBili  1.1  /  DBili  x   /  AST  22  /  ALT  19  /  AlkPhos  71  03-21    CARDIAC MARKERS ( 21 Mar 2019 18:54 )  0.027 ng/mL / x     / x     / x     / x      CARDIAC MARKERS ( 21 Mar 2019 15:53 )  0.032 ng/mL / x     / x     / x     / x      CARDIAC MARKERS ( 21 Mar 2019 13:12 )  0.027 ng/mL / x     / x     / x     / x            Pro BNP  5637  @ 13:12  D Dimer  --  @ 13:12    PT/INR - ( 22 Mar 2019 05:26 )   PT: 30.7 sec;   INR: 2.68 ratio         PTT - ( 21 Mar 2019 13:12 )  PTT:39.8 sec  Urinalysis Basic - ( 21 Mar 2019 13:45 )    Color: Yellow / Appearance: Clear / S.005 / pH: x  Gluc: x / Ketone: Negative  / Bili: Negative / Urobili: Negative mg/dL   Blood: x / Protein: Negative mg/dL / Nitrite: Negative   Leuk Esterase: Negative / RBC: 3-5 /HPF / WBC Negative   Sq Epi: x / Non Sq Epi: x / Bacteria: Occasional            RADIOLOGY & ADDITIONAL STUDIES:

## 2019-03-22 NOTE — CONSULT NOTE ADULT - ASSESSMENT
ASSESSMENT: This patient presents for evaluation of shortness of breath from our office.    Acute on chronic diastolic heart failure    Chronic atrial fibrillation on anticoagulation    Chronic venous insufficiency    Hypertension    Hyperlipidemia         PLAN:    Continue with diuretics. Recommend pulmonary evaluation. Recommend CAT scan along.    Hold anticoagulation as she may need a thoracentesis.    Continue monitor renal function and electrolytes.    DVT and GI ppx      d/w PCP and Son      Thank you for allowing me to participate in the care of your patient.  Please contact me should any questions arise.         ALFONZO Ayala DO, FACC ASSESSMENT: This patient presents for evaluation of shortness of breath from our office.    Acute on chronic diastolic heart failure  Pleural Effusion on CT  Chronic atrial fibrillation on anticoagulation  Chronic venous insufficiency  Hypertension  Hyperlipidemia         PLAN:    Continue with diuretics. Recommend pulmonary evaluation. CT show pleural effusion  Resume AC -  no plan for tap at this time - pt feeling better   Decrease CCB and ACEi  Continue monitor renal function and electrolytes.  DVT and GI ppx    d/w PCP and Son      Thank you for allowing me to participate in the care of your patient.  Please contact me should any questions arise.    ALFONZO Ayala DO, FACC

## 2019-03-23 LAB
ANION GAP SERPL CALC-SCNC: 3 MMOL/L — LOW (ref 5–17)
BUN SERPL-MCNC: 22 MG/DL — SIGNIFICANT CHANGE UP (ref 7–23)
CALCIUM SERPL-MCNC: 8.3 MG/DL — LOW (ref 8.5–10.1)
CHLORIDE SERPL-SCNC: 102 MMOL/L — SIGNIFICANT CHANGE UP (ref 96–108)
CO2 SERPL-SCNC: 35 MMOL/L — HIGH (ref 22–31)
CREAT SERPL-MCNC: 0.62 MG/DL — SIGNIFICANT CHANGE UP (ref 0.5–1.3)
GLUCOSE SERPL-MCNC: 86 MG/DL — SIGNIFICANT CHANGE UP (ref 70–99)
INR BLD: 2.83 RATIO — HIGH (ref 0.88–1.16)
MAGNESIUM SERPL-MCNC: 1.8 MG/DL — SIGNIFICANT CHANGE UP (ref 1.6–2.6)
NT-PROBNP SERPL-SCNC: 3781 PG/ML — HIGH (ref 0–450)
POTASSIUM SERPL-MCNC: 3.7 MMOL/L — SIGNIFICANT CHANGE UP (ref 3.5–5.3)
POTASSIUM SERPL-SCNC: 3.7 MMOL/L — SIGNIFICANT CHANGE UP (ref 3.5–5.3)
PROTHROM AB SERPL-ACNC: 32.4 SEC — HIGH (ref 10–12.9)
SODIUM SERPL-SCNC: 140 MMOL/L — SIGNIFICANT CHANGE UP (ref 135–145)

## 2019-03-23 RX ORDER — FUROSEMIDE 40 MG
20 TABLET ORAL THREE TIMES A DAY
Qty: 0 | Refills: 0 | Status: DISCONTINUED | OUTPATIENT
Start: 2019-03-23 | End: 2019-03-26

## 2019-03-23 RX ORDER — DILTIAZEM HCL 120 MG
1 CAPSULE, EXT RELEASE 24 HR ORAL
Qty: 0 | Refills: 0 | COMMUNITY

## 2019-03-23 RX ORDER — FUROSEMIDE 40 MG
1 TABLET ORAL
Qty: 30 | Refills: 0 | OUTPATIENT
Start: 2019-03-23

## 2019-03-23 RX ORDER — FUROSEMIDE 40 MG
1 TABLET ORAL
Qty: 0 | Refills: 0 | COMMUNITY

## 2019-03-23 RX ADMIN — Medication 10 MILLIEQUIVALENT(S): at 12:12

## 2019-03-23 RX ADMIN — LISINOPRIL 5 MILLIGRAM(S): 2.5 TABLET ORAL at 05:23

## 2019-03-23 RX ADMIN — ATORVASTATIN CALCIUM 10 MILLIGRAM(S): 80 TABLET, FILM COATED ORAL at 21:35

## 2019-03-23 RX ADMIN — Medication 20 MILLIGRAM(S): at 05:23

## 2019-03-23 RX ADMIN — Medication 180 MILLIGRAM(S): at 05:23

## 2019-03-23 RX ADMIN — Medication 20 MILLIGRAM(S): at 12:13

## 2019-03-23 RX ADMIN — Medication 112 MICROGRAM(S): at 05:23

## 2019-03-23 RX ADMIN — Medication 20 MILLIGRAM(S): at 21:35

## 2019-03-24 LAB
ANION GAP SERPL CALC-SCNC: 5 MMOL/L — SIGNIFICANT CHANGE UP (ref 5–17)
BASE EXCESS BLDA CALC-SCNC: 10.5 MMOL/L — HIGH (ref -2–2)
BLOOD GAS COMMENTS ARTERIAL: SIGNIFICANT CHANGE UP
BUN SERPL-MCNC: 23 MG/DL — SIGNIFICANT CHANGE UP (ref 7–23)
CALCIUM SERPL-MCNC: 8.4 MG/DL — LOW (ref 8.5–10.1)
CHLORIDE SERPL-SCNC: 102 MMOL/L — SIGNIFICANT CHANGE UP (ref 96–108)
CO2 SERPL-SCNC: 35 MMOL/L — HIGH (ref 22–31)
CREAT SERPL-MCNC: 0.69 MG/DL — SIGNIFICANT CHANGE UP (ref 0.5–1.3)
GAS PNL BLDA: SIGNIFICANT CHANGE UP
GLUCOSE SERPL-MCNC: 86 MG/DL — SIGNIFICANT CHANGE UP (ref 70–99)
HCO3 BLDA-SCNC: 37 MMOL/L — HIGH (ref 21–29)
INR BLD: 2.42 RATIO — HIGH (ref 0.88–1.16)
PCO2 BLDA: 56 MMHG — HIGH (ref 32–46)
PH BLDA: 7.43 — SIGNIFICANT CHANGE UP (ref 7.35–7.45)
PO2 BLDA: 100 MMHG — SIGNIFICANT CHANGE UP (ref 74–108)
POTASSIUM SERPL-MCNC: 4.2 MMOL/L — SIGNIFICANT CHANGE UP (ref 3.5–5.3)
POTASSIUM SERPL-SCNC: 4.2 MMOL/L — SIGNIFICANT CHANGE UP (ref 3.5–5.3)
PROTHROM AB SERPL-ACNC: 27.6 SEC — HIGH (ref 10–12.9)
SAO2 % BLDA: 98 % — HIGH (ref 92–96)
SODIUM SERPL-SCNC: 142 MMOL/L — SIGNIFICANT CHANGE UP (ref 135–145)

## 2019-03-24 RX ADMIN — ATORVASTATIN CALCIUM 10 MILLIGRAM(S): 80 TABLET, FILM COATED ORAL at 21:52

## 2019-03-24 RX ADMIN — Medication 20 MILLIGRAM(S): at 05:30

## 2019-03-24 RX ADMIN — Medication 20 MILLIGRAM(S): at 21:52

## 2019-03-24 RX ADMIN — LISINOPRIL 5 MILLIGRAM(S): 2.5 TABLET ORAL at 05:31

## 2019-03-24 RX ADMIN — Medication 20 MILLIGRAM(S): at 13:44

## 2019-03-24 RX ADMIN — Medication 112 MICROGRAM(S): at 05:31

## 2019-03-24 RX ADMIN — Medication 180 MILLIGRAM(S): at 05:31

## 2019-03-24 RX ADMIN — Medication 10 MILLIEQUIVALENT(S): at 13:44

## 2019-03-25 LAB
ANION GAP SERPL CALC-SCNC: 4 MMOL/L — LOW (ref 5–17)
BUN SERPL-MCNC: 25 MG/DL — HIGH (ref 7–23)
CALCIUM SERPL-MCNC: 8.5 MG/DL — SIGNIFICANT CHANGE UP (ref 8.5–10.1)
CHLORIDE SERPL-SCNC: 101 MMOL/L — SIGNIFICANT CHANGE UP (ref 96–108)
CO2 SERPL-SCNC: 39 MMOL/L — HIGH (ref 22–31)
CREAT SERPL-MCNC: 0.8 MG/DL — SIGNIFICANT CHANGE UP (ref 0.5–1.3)
GLUCOSE SERPL-MCNC: 91 MG/DL — SIGNIFICANT CHANGE UP (ref 70–99)
HCT VFR BLD CALC: 40.6 % — SIGNIFICANT CHANGE UP (ref 34.5–45)
HGB BLD-MCNC: 12.8 G/DL — SIGNIFICANT CHANGE UP (ref 11.5–15.5)
INR BLD: 1.97 RATIO — HIGH (ref 0.88–1.16)
MCHC RBC-ENTMCNC: 29.8 PG — SIGNIFICANT CHANGE UP (ref 27–34)
MCHC RBC-ENTMCNC: 31.5 GM/DL — LOW (ref 32–36)
MCV RBC AUTO: 94.6 FL — SIGNIFICANT CHANGE UP (ref 80–100)
NRBC # BLD: 0 /100 WBCS — SIGNIFICANT CHANGE UP (ref 0–0)
PLATELET # BLD AUTO: 115 K/UL — LOW (ref 150–400)
POTASSIUM SERPL-MCNC: 4.4 MMOL/L — SIGNIFICANT CHANGE UP (ref 3.5–5.3)
POTASSIUM SERPL-SCNC: 4.4 MMOL/L — SIGNIFICANT CHANGE UP (ref 3.5–5.3)
PROTHROM AB SERPL-ACNC: 22.3 SEC — HIGH (ref 10–12.9)
RBC # BLD: 4.29 M/UL — SIGNIFICANT CHANGE UP (ref 3.8–5.2)
RBC # FLD: 13.3 % — SIGNIFICANT CHANGE UP (ref 10.3–14.5)
SODIUM SERPL-SCNC: 144 MMOL/L — SIGNIFICANT CHANGE UP (ref 135–145)
WBC # BLD: 2.48 K/UL — LOW (ref 3.8–10.5)
WBC # FLD AUTO: 2.48 K/UL — LOW (ref 3.8–10.5)

## 2019-03-25 RX ORDER — WARFARIN SODIUM 2.5 MG/1
2 TABLET ORAL DAILY
Qty: 0 | Refills: 0 | Status: DISCONTINUED | OUTPATIENT
Start: 2019-03-25 | End: 2019-03-26

## 2019-03-25 RX ADMIN — Medication 10 MILLIEQUIVALENT(S): at 11:44

## 2019-03-25 RX ADMIN — Medication 112 MICROGRAM(S): at 05:50

## 2019-03-25 RX ADMIN — Medication 20 MILLIGRAM(S): at 05:50

## 2019-03-25 RX ADMIN — LISINOPRIL 5 MILLIGRAM(S): 2.5 TABLET ORAL at 05:50

## 2019-03-25 RX ADMIN — Medication 20 MILLIGRAM(S): at 21:30

## 2019-03-25 RX ADMIN — Medication 180 MILLIGRAM(S): at 05:50

## 2019-03-25 RX ADMIN — ATORVASTATIN CALCIUM 10 MILLIGRAM(S): 80 TABLET, FILM COATED ORAL at 21:30

## 2019-03-25 RX ADMIN — Medication 20 MILLIGRAM(S): at 14:20

## 2019-03-25 RX ADMIN — WARFARIN SODIUM 2 MILLIGRAM(S): 2.5 TABLET ORAL at 21:30

## 2019-03-25 NOTE — CHART NOTE - NSCHARTNOTEFT_GEN_A_CORE
Patient will require home oxygen upon discharge due to continued hypoxia related to chronic diastolic CHF, pulmonary HTN and bilateral pleural effusions. Her acute episode has resolved. O2 sat at rest on RA- 94%, O2 sat on ambulation on room air- 87%, O2 sat on ambulation on 2L NC- 92%. Patient is in agreement to having home oxygen.

## 2019-03-26 ENCOUNTER — TRANSCRIPTION ENCOUNTER (OUTPATIENT)
Age: 84
End: 2019-03-26

## 2019-03-26 VITALS
OXYGEN SATURATION: 95 % | TEMPERATURE: 97 F | DIASTOLIC BLOOD PRESSURE: 52 MMHG | WEIGHT: 126.32 LBS | RESPIRATION RATE: 18 BRPM | HEART RATE: 61 BPM | SYSTOLIC BLOOD PRESSURE: 113 MMHG

## 2019-03-26 LAB
ANION GAP SERPL CALC-SCNC: 0 MMOL/L — LOW (ref 5–17)
BUN SERPL-MCNC: 26 MG/DL — HIGH (ref 7–23)
CALCIUM SERPL-MCNC: 8.6 MG/DL — SIGNIFICANT CHANGE UP (ref 8.5–10.1)
CHLORIDE SERPL-SCNC: 99 MMOL/L — SIGNIFICANT CHANGE UP (ref 96–108)
CO2 SERPL-SCNC: 39 MMOL/L — HIGH (ref 22–31)
CREAT SERPL-MCNC: 0.65 MG/DL — SIGNIFICANT CHANGE UP (ref 0.5–1.3)
CULTURE RESULTS: SIGNIFICANT CHANGE UP
CULTURE RESULTS: SIGNIFICANT CHANGE UP
GLUCOSE SERPL-MCNC: 92 MG/DL — SIGNIFICANT CHANGE UP (ref 70–99)
INR BLD: 1.63 RATIO — HIGH (ref 0.88–1.16)
POTASSIUM SERPL-MCNC: 4 MMOL/L — SIGNIFICANT CHANGE UP (ref 3.5–5.3)
POTASSIUM SERPL-SCNC: 4 MMOL/L — SIGNIFICANT CHANGE UP (ref 3.5–5.3)
PROTHROM AB SERPL-ACNC: 18.4 SEC — HIGH (ref 10–12.9)
SODIUM SERPL-SCNC: 138 MMOL/L — SIGNIFICANT CHANGE UP (ref 135–145)
SPECIMEN SOURCE: SIGNIFICANT CHANGE UP
SPECIMEN SOURCE: SIGNIFICANT CHANGE UP

## 2019-03-26 RX ORDER — BUMETANIDE 0.25 MG/ML
1 INJECTION INTRAMUSCULAR; INTRAVENOUS
Qty: 60 | Refills: 0
Start: 2019-03-26 | End: 2019-04-24

## 2019-03-26 RX ADMIN — Medication 180 MILLIGRAM(S): at 05:46

## 2019-03-26 RX ADMIN — Medication 10 MILLIEQUIVALENT(S): at 11:59

## 2019-03-26 RX ADMIN — LISINOPRIL 5 MILLIGRAM(S): 2.5 TABLET ORAL at 05:46

## 2019-03-26 RX ADMIN — Medication 20 MILLIGRAM(S): at 05:46

## 2019-03-26 RX ADMIN — Medication 112 MICROGRAM(S): at 05:46

## 2019-03-26 NOTE — CHART NOTE - NSCHARTNOTEFT_GEN_A_CORE
Upon Nutritional Assessment by the Registered Dietitian your patient was determined to meet criteria / has evidence of the following diagnosis/diagnoses:          [ ]  Mild Protein Calorie Malnutrition        [ ]  Moderate Protein Calorie Malnutrition        [x] Severe Protein Calorie Malnutrition        [ ] Unspecified Protein Calorie Malnutrition        [x] Underweight / BMI <19        [ ] Morbid Obesity / BMI > 40      Findings as based on:  •  Comprehensive nutrition assessment and consultation  •  Calorie counts (nutrient intake analysis)  •  Food acceptance and intake status from observations by staff  •  Follow up  •  Patient education  •  Intervention secondary to interdisciplinary rounds  •   concerns      Treatment:    The following diet has been recommended:  -Ensure enlive BID  -Encourage PO intake      PROVIDER Section:     By signing this assessment you are acknowledging and agree with the diagnosis/diagnoses assigned by the Registered Dietitian    Comments:

## 2019-03-26 NOTE — DIETITIAN INITIAL EVALUATION ADULT. - PERTINENT LABORATORY DATA
03-26 Na138 mmol/L Glu 92 mg/dL K+ 4.0 mmol/L Cr  0.65 mg/dL BUN 26 mg/dL<H> Phos n/a   Alb n/a   PAB n/a

## 2019-03-26 NOTE — PROGRESS NOTE ADULT - PROVIDER SPECIALTY LIST ADULT
Cardiology
Hospitalist
Hospitalist
Pulmonology
Hospitalist
Hospitalist

## 2019-03-26 NOTE — DIETITIAN INITIAL EVALUATION ADULT. - PHYSICAL APPEARANCE
Pt with clear signs of muscle and fat wasting; NFPE Reveaed severe muscle wasting in temple, clavicle, moderate wasting in deltoid. Unable to examine legs 2/2 edema; Severe fat wasting in ribs and occular, moderate fat wasting in triceps./emaciated/other (specify)/underweight

## 2019-03-26 NOTE — DIETITIAN INITIAL EVALUATION ADULT. - OTHER INFO
Pt seen for assessment for suspected malnutrition. Pt's pmhx noted for hypothyroid, HLD, HTN, Afib. Pt and son at bedside. Pt descibed appettie as normal and unchanging. When asking pt about amount of food and intake. Pt had difficulty providing meaningful information. Pt denies chewing or swallowing difficulty, but mentioned she skips breakfast, eats a light lunch and a good dinner. Pt denies n/v/d/c. Pt pradeep 18, 2+ edema in right leg and 1+ edema in left leg. Stage I PU on bilateral heel. Pt NFPE noted above. Pt meets criteria for severe protein-calorie malnutrition in the context of chronic illness or injury. Recommend Ensure Enlive BID, encourage PO intake, Monitor weight. Will continue to monitor pt's nutritional status

## 2019-03-26 NOTE — PROGRESS NOTE ADULT - SUBJECTIVE AND OBJECTIVE BOX
INTERVAL HPI/OVERNIGHT EVENTS:  This is a pleasant 88 y/o female with a PMHx of CHF preserved EF (55% 12/18 on ECHO), Afib on Warfarin who was sent to the ED from cardiologist's office for worsening dyspnea found to be hypoxic 88% on RA.   In the ED, notable labs include elevated ProBNP 8K, initial trops X 2 normal and EKG with A. fib rate controlled. CXR revealed CHF with moderate effusions and needed Venti mask. .     Hospital course: admitted for acute on chronic diastolic CHF responsive to IV lasix. Initially became hypotensive thus BP meds (Lisinopril and Diltiazem lowered).     3/23: SOB this AM hypoxic to 84% on RA, no CP. Was planning dc but now on hold for further inpatient diuresis.   3/24/19- Patient seen and examined at bedside. Son present. Patient states she is feeling well, states breathing is improving, able to take deeper breaths. No acute events overnight.    MEDICATIONS  (STANDING):  atorvastatin 10 milliGRAM(s) Oral at bedtime  diltiazem    milliGRAM(s) Oral daily  furosemide   Injectable 20 milliGRAM(s) IV Push three times a day  levothyroxine 112 MICROGram(s) Oral daily  lisinopril 5 milliGRAM(s) Oral daily  potassium chloride    Tablet ER 10 milliEquivalent(s) Oral daily    MEDICATIONS  (PRN):  acetaminophen   Tablet .. 650 milliGRAM(s) Oral every 6 hours PRN Mild Pain (1 - 3)  docusate sodium 100 milliGRAM(s) Oral two times a day PRN Constipation  loperamide Solution 2 milliGRAM(s) Oral two times a day PRN Diarrhea  ondansetron Injectable 4 milliGRAM(s) IV Push every 4 hours PRN Nausea and/or Vomiting  senna 2 Tablet(s) Oral at bedtime PRN Constipation      Allergies    Bactrim (Unknown)    Intolerances      ROS:  CONSTITUTIONAL: + weakness, no fevers or chills  EYES/ENT: No visual changes;  No vertigo or throat pain   NECK: No pain or stiffness  RESPIRATORY: No cough, wheezing, hemoptysis; + shortness of breath on exertion  CARDIOVASCULAR: No chest pain or palpitations  GASTROINTESTINAL: No abdominal or epigastric pain. No nausea, vomiting, or hematemesis  GENITOURINARY: No dysuria, frequency or hematuria  NEUROLOGICAL: No numbness  SKIN: No itching, burning, rashes, or lesions   All other review of systems is negative unless indicated above.    Vital Signs Last 24 Hrs  T(C): 36.4 (24 Mar 2019 05:06), Max: 36.7 (23 Mar 2019 17:59)  T(F): 97.5 (24 Mar 2019 05:06), Max: 98.1 (23 Mar 2019 17:59)  HR: 74 (24 Mar 2019 05:06) (58 - 76)  BP: 124/66 (24 Mar 2019 05:06) (92/44 - 124/66)  BP(mean): --  RR: 16 (24 Mar 2019 05:06) (16 - 17)  SpO2: 95% (24 Mar 2019 05:06) (95% - 99%)    03-23 @ 07:01  -  03-24 @ 07:00  --------------------------------------------------------  IN: 0 mL / OUT: 600 mL / NET: -600 mL      Physical Exam:  General: WN/WD NAD  Neurology: A&Ox3, nonfocal, HAMILTON x 4  Respiratory: dec breath sounds B/L bases, no wheezing  CV: irregularly irregular, S1S2, no murmurs, rubs or gallops  Abdominal: Soft, NT, ND +BS  Extremities: B/L LE wrapped with ACE for compression, + peripheral pulses      LABS:    03-24    142  |  102  |  23  ----------------------------<  86  4.2   |  35<H>  |  0.69    Ca    8.4<L>      24 Mar 2019 05:40  Mg     1.8     03-23      PT/INR - ( 24 Mar 2019 05:40 )   PT: 27.6 sec;   INR: 2.42 ratio               RADIOLOGY & ADDITIONAL TESTS:
CC: SOB:    HPI: This is a pleasant 88 y/o female with a PMHx of CHF preserved EF (55% 12/18 on ECHO), Afib on Warfarin who was sent to the ED from cardiologist's office for worsening dyspnea found to be hypoxic 88% on RA.   In the ED, notable labs include elevated ProBNP 8K, initial trops X 2 normal and EKG with A. fib rate controlled. CXR revealed CHF with moderate effusions and needed Venti mask. .     Hospital course: admitted for acute on chronic diastolic CHF responsive to IV lasix. Initially became hypotensive thus BP meds (Lisinopril and Diltiazem lowered).     3/23: SOB this AM hypoxic to 84% on RA, no CP. Was planning dc but now on hold for further inpatient diuresis.     ROS: + ECHEVERRIA, all other ROS reviewed and neg unless stated above.     Vital Signs Last 24 Hrs  T(C): 36.5 (23 Mar 2019 10:44), Max: 36.5 (23 Mar 2019 10:44)  T(F): 97.7 (23 Mar 2019 10:44), Max: 97.7 (23 Mar 2019 10:44)  HR: 76 (23 Mar 2019 10:44) (55 - 86)  BP: 111/38 (23 Mar 2019 10:44) (87/43 - 120/62)  BP(mean): --  RR: 16 (23 Mar 2019 10:44) (16 - 19)  SpO2: 95% (23 Mar 2019 10:44) (93% - 99%)    PHYSICAL EXAM:  Constitutional: frail elderly female in mild respiratory distress with prolonged conversation but looks calm sitting up in chair.   HEENT: PERR, EOMI, mildly hard of Hearing, MMM  Neck: Soft and supple, No LAD, No JVD  Respiratory: Has bilateral crackles on bases, no wheezing   Cardiovascular: S1 and S2, regular rate and irregularly irregular rhythm.  Gastrointestinal: Bowel Sounds present, soft, nontender, nondistended, no guarding, no rebound  Extremities: +1 LE edema  Vascular: 2+ peripheral pulses  Neurological: A/O x 3, no focal deficits  Musculoskeletal: 5/5 strength b/l upper and lower extremities  Skin: No rashes    MEDICATIONS  (STANDING):  atorvastatin 10 milliGRAM(s) Oral at bedtime  diltiazem    milliGRAM(s) Oral daily  furosemide   Injectable 20 milliGRAM(s) IV Push three times a day  levothyroxine 112 MICROGram(s) Oral daily  lisinopril 5 milliGRAM(s) Oral daily  potassium chloride    Tablet ER 10 milliEquivalent(s) Oral daily    LABS: All Labs Reviewed:                        13.4   3.75  )-----------( 123      ( 21 Mar 2019 13:12 )             42.8   03-23    140  |  102  |  22  ----------------------------<  86  3.7   |  35<H>  |  0.62    Ca    8.3<L>      23 Mar 2019 05:19  Mg     1.8     03-23    TPro  7.3  /  Alb  3.8  /  TBili  1.1  /  DBili  x   /  AST  22  /  ALT  19  /  AlkPhos  71  03-21  PT/INR - ( 22 Mar 2019 05:26 )   PT: 30.7 sec;   INR: 2.68 ratio    PT/INR - ( 23 Mar 2019 05:19 )   PT: 32.4 sec;   INR: 2.83 ratio         PTT - ( 21 Mar 2019 13:12 )  PTT:39.8 sec  PTT - ( 21 Mar 2019 13:12 )  PTT:39.8 sec  CARDIAC MARKERS ( 21 Mar 2019 18:54 )  0.027 ng/mL / x     / x     / x     / x      CARDIAC MARKERS ( 21 Mar 2019 15:53 )  0.032 ng/mL / x     / x     / x     / x      CARDIAC MARKERS ( 21 Mar 2019 13:12 )  0.027 ng/mL / x     / x     / x     / x         CT Chest No Cont (03.21.19 @ 18:47)   IMPRESSION:  Findings suggestive of pulmonary edema.  Moderate to large   right pleural effusion with underlying compressive atelectasis and/or   infiltrate. Small left pleural effusion with a loculated component along   the lateral pleura. Consolidation at the left lung base, may reflect   atelectasis and/or infiltrate. Scattered small groundglass opacities   throughout the bilateral lungs.      Serum Pro-Brain Natriuretic Peptide (03.21.19 @ 13:12)    Serum Pro-Brain Natriuretic Peptide: 5637 pg/mL    EKG: reviewed A fib.     Xray Chest 1 View AP/PA. (03.21.19 @ 13:49) >  IMPRESSION: Congestive heart failure with moderate pleural effusions.     ASSESSMENT AND PLAN:     This is a pleasant 88 y/o female with a PMHx of CHF preserved EF (55% 12/18 on ECHO), Afib on Warfarin admitted for acute hypoxic respiratory failure secondary to acute on chronic diastolic CHF exacerbation:     # Acute Hypoxic Respiratory Failure  # Acute on Chronic Diastolic CHF Exacerbation - preserved EF  # Bilateral Moderate Pleural effusions  # Hypotension - resolved after dc BP meds.  - DC plan on hold --> remain inpatinet for continued IV diuresis.   - increase Lasix back to 20mg TID IV, monitor I's/O's, daily weights.   - fluid restrict to 1.5L  - ECHO done 3 months ago, moderate pulmonary HTN noted, severe LA dilatation.   - CT chest with bilateral moderate pleural effusions --> appreciate pulm input.   - STOP coumadin in case patient to get thoracentesis on Monday if no improvement noted with IV lasix.   - not on home O2, attempt to wean off otherwise will need ambulatory pulse ox prior to discharge.   - no signs of sepsis.  - fall precautions and PT needed.    # Chronic A. fib - currently rate controlled on Diltiazem lowered to 180mg.   - INR therapeutic, but now holding coumadin.   - on statin.    # Hypothyroidism - continue home does Synthroid.     # Suspected Severe protein calorie malnutrition - consulted nutrition.   - denies dysphagia issues.     DVT ppx: therapeutic on coumadin  Dispo: remain inpatient on telemetry.     Case discussed in depth with sons, patient and Cardiologist and Pulm.  Code Status: spent additional 18mins discussing advanced goals of care and patient would like to be DNR/DNI.
CC: SOB:    HPI: This is a pleasant 88 y/o female with a PMHx of CHF preserved EF (55% 12/18 on ECHO), Afib on Warfarin who was sent to the ED from cardiologist's office for worsening dyspnea found to be hypoxic 88% on RA. Patient had been suffering from progressive dyspnea worsened with exertion, orthopnea and LE edema for the past several weeks. She started new Lasix 2 weeks ago with alternating doses without any improvement. No reports of fevers/ chills/ chest pain or congestion. Outpatient CXR revealed bilateral moderate pleural effusions.     In the ED, notable labs include elevated ProBNP 8K, initial trops X 2 normal and EKG with A. fib rate controlled. CXR revealed CHF with moderate effusions.   Seen with family at bedside, dyspneic with prolonged conversation requiring ventimask for oxygenation sating 93% on 30% supplemental O2.     3/22/19: Less SOB today at rest but seen dyspneic with ambulation in the hallway. No CP. No fevers. Discussed with son at bedside. Notable hypotension this AM.     ROS: + ECHEVERRIA, all other ROS reviewed and neg unless stated above.       Vital Signs Last 24 Hrs  T(C): 36.4 (22 Mar 2019 11:08), Max: 36.7 (21 Mar 2019 17:13)  T(F): 97.5 (22 Mar 2019 11:08), Max: 98.1 (21 Mar 2019 17:13)  HR: 55 (22 Mar 2019 11:08) (55 - 88)  BP: 87/43 (22 Mar 2019 11:08) (87/43 - 150/80)  BP(mean): --  RR: 19 (22 Mar 2019 11:08) (19 - 24)  SpO2: 93% (22 Mar 2019 12:36) (91% - 99%)    PHYSICAL EXAM:  Constitutional: frail elderly female in mild respiratory distress with prolonged ambulation otherwise awake and alert, well-developed  HEENT: PERR, EOMI, mildly hard of Hearing, MMM  Neck: Soft and supple, No LAD, No JVD  Respiratory: Breath sounds diminished at bases, no wheezing.   Cardiovascular: S1 and S2, regular rate and irregularly irregular rhythm.  Gastrointestinal: Bowel Sounds present, soft, nontender, nondistended, no guarding, no rebound  Extremities: +1 LE edema  Vascular: 2+ peripheral pulses  Neurological: A/O x 3, no focal deficits  Musculoskeletal: 5/5 strength b/l upper and lower extremities  Skin: No rashes    MEDICATIONS  (STANDING):  atorvastatin 10 milliGRAM(s) Oral at bedtime  diltiazem    milliGRAM(s) Oral daily  furosemide   Injectable 20 milliGRAM(s) IV Push two times a day  levothyroxine 112 MICROGram(s) Oral daily  lisinopril 10 milliGRAM(s) Oral daily  potassium chloride    Tablet ER 10 milliEquivalent(s) Oral daily  warfarin 2 milliGRAM(s) Oral at bedtime    LABS: All Labs Reviewed:                        13.4   3.75  )-----------( 123      ( 21 Mar 2019 13:12 )             42.8     03-22    144  |  104  |  18  ----------------------------<  81  3.3<L>   |  32<H>  |  0.59    Ca    8.1<L>      22 Mar 2019 05:26    TPro  7.3  /  Alb  3.8  /  TBili  1.1  /  DBili  x   /  AST  22  /  ALT  19  /  AlkPhos  71  03-21    PT/INR - ( 22 Mar 2019 05:26 )   PT: 30.7 sec;   INR: 2.68 ratio      PTT - ( 21 Mar 2019 13:12 )  PTT:39.8 sec  CARDIAC MARKERS ( 21 Mar 2019 18:54 )  0.027 ng/mL / x     / x     / x     / x      CARDIAC MARKERS ( 21 Mar 2019 15:53 )  0.032 ng/mL / x     / x     / x     / x      CARDIAC MARKERS ( 21 Mar 2019 13:12 )  0.027 ng/mL / x     / x     / x     / x         CT Chest No Cont (03.21.19 @ 18:47)   IMPRESSION:  Findings suggestive of pulmonary edema.  Moderate to large   right pleural effusion with underlying compressive atelectasis and/or   infiltrate. Small left pleural effusion with a loculated component along   the lateral pleura. Consolidation at the left lung base, may reflect   atelectasis and/or infiltrate. Scattered small groundglass opacities   throughout the bilateral lungs.      Serum Pro-Brain Natriuretic Peptide (03.21.19 @ 13:12)    Serum Pro-Brain Natriuretic Peptide: 5637 pg/mL    EKG: reviewed A fib.     Xray Chest 1 View AP/PA. (03.21.19 @ 13:49) >  IMPRESSION: Congestive heart failure with moderate pleural effusions.     ASSESSMENT AND PLAN:     This is a pleasant 88 y/o female with a PMHx of CHF preserved EF (55% 12/18 on ECHO), Afib on Warfarin admitted for acute hypoxic respiratory failure secondary to acute on chronic diastolic CHF exacerbation:     # Acute Hypoxic Respiratory Failure  # Acute on Chronic Diastolic CHF Exacerbation - preserved EF  # Bilateral Moderate Pleural effusions  # Hypotension  - improving with IV diuresis.   - decrease Lasix to BID from TID 20mg given hypotension with hold parameters.   - monitor daily weights and I's/O's.   - fluid restrict to 1.5L  - ECHO done 3 months ago, moderate pulmonary HTN noted, severe LA dilatation.   - CT chest with bilateral moderate pleural effusions --> appreciate pulm input.   - will f/u outpatient for potential thoracentesis   - no signs of sepsis.  - wean supplemental O2 off, not on chronic home O2.   - fall precautions and PT needed.    # Chronic A. fib - currently rate controlled on Diltiazem.   - INR therapeutic, continue Coumadin 2mg QHS.   - on statin.    # Hypothyroidism - continue home does Synthroid.     # Suspected Severe protein calorie malnutrition - consulted nutrition.   - denies dysphagia issues.     DVT ppx: therapeutic on coumadin  Dispo: remain inpatient on telemetry. Tentative dc home in 1-2 days.     Case discussed in depth with sons, patient and Cardiologist.   Code Status: spent additional 18mins discussing advanced goals of care and patient would like to be DNR/DNI.
FOLLOW UP NOTE   Pt seen and examined. SOB improving on Lasix     ________________________________  REVIEW OF SYSTEMS  A 10 point review of system has been performed, and is negative except for what has been mentioned in the above history of present illness.     MEDICATIONS  (STANDING):  atorvastatin 10 milliGRAM(s) Oral at bedtime  diltiazem    milliGRAM(s) Oral daily  furosemide   Injectable 20 milliGRAM(s) IV Push every 8 hours  levothyroxine 112 MICROGram(s) Oral daily  lisinopril 10 milliGRAM(s) Oral daily  potassium chloride    Tablet ER 10 milliEquivalent(s) Oral daily  potassium chloride    Tablet ER 20 milliEquivalent(s) Oral every 2 hours    MEDICATIONS  (PRN):  acetaminophen   Tablet .. 650 milliGRAM(s) Oral every 6 hours PRN Mild Pain (1 - 3)  docusate sodium 100 milliGRAM(s) Oral two times a day PRN Constipation  ondansetron Injectable 4 milliGRAM(s) IV Push every 4 hours PRN Nausea and/or Vomiting  senna 2 Tablet(s) Oral at bedtime PRN Constipation    ________________________________  PHYSICAL EXAM:  Vital Signs Last 24 Hrs  T(C): 36.4 (22 Mar 2019 11:08), Max: 36.7 (21 Mar 2019 17:13)  T(F): 97.5 (22 Mar 2019 11:08), Max: 98.1 (21 Mar 2019 17:13)  HR: 55 (22 Mar 2019 11:08) (55 - 111)  BP: 87/43 (22 Mar 2019 11:08) (87/43 - 154/83)  BP(mean): --  RR: 19 (22 Mar 2019 11:08) (19 - 24)  SpO2: 99% (22 Mar 2019 11:08) (91% - 99%)  I&O's Summary    GENERAL APPEARANCE:  No acute distress  HEAD: normocephalic, atraumatic  NECK: supple, no jugular venous distention, no carotid bruit  HEART:  irregularly irregular, S1, S2 normal 1/6 sysolic murmur  CHEST:  No anterior chest wall tenderness  LUNGS:  crackles with decrease breath sounds  ABDOMEN soft, nontender, nondistended, with positive bowel sounds appreciated  EXTREMITIES: no clubbing, cyanosis, LE edema and chronic changes  NEURO:  Alert and oriented x3  PSYC:  Normal affect  SKIN:  Dry    ________________________________  TELEMETRY: Afib w/ mod vent response and bradycardia         LABS:                        13.4   3.75  )-----------( 123      ( 21 Mar 2019 13:12 )             42.8                 144  |  104  |  18  ----------------------------<  81  3.3<L>   |  32<H>  |  0.59    Ca    8.1<L>      22 Mar 2019 05:26    TPro  7.3  /  Alb  3.8  /  TBili  1.1  /  DBili  x   /  AST  22  /  ALT  19  /  AlkPhos  71   @ 18:54  Trop-I  0.027  CK      --  CK-MB   --     @ 15:53  Trop-I  0.032  CK      --  CK-MB   --     @ 13:12  Trop-I  0.027  CK      --  CK-MB   --    Pro BNP  5637  @ 13:12  D Dimer  --  @ 13:12    PT/INR - ( 22 Mar 2019 05:26 )   PT: 30.7 sec;   INR: 2.68 ratio         PTT - ( 21 Mar 2019 13:12 )  PTT:39.8 sec  Urinalysis Basic - ( 21 Mar 2019 13:45 )    Color: Yellow / Appearance: Clear / S.005 / pH: x  Gluc: x / Ketone: Negative  / Bili: Negative / Urobili: Negative mg/dL   Blood: x / Protein: Negative mg/dL / Nitrite: Negative   Leuk Esterase: Negative / RBC: 3-5 /HPF / WBC Negative   Sq Epi: x / Non Sq Epi: x / Bacteria: Occasional    EKG: Per my interpretation, atrial fibrillation with a moderate ventricular response at 103 bpm. Normal axis. Nonspecific ST-T wave changes. Voltage criteria consistent with LVH.
Patient is a 89y old  Female who presents with a chief complaint of Worsening SOB, sent in from Cardiology office for hypoxia. (21 Mar 2019 17:48)      HPI:  This is a pleasant 88 y/o female with a PMHx of CHF preserved EF (55%  on ECHO), Afib on Warfarin who was sent to the ED from cardiologist's office for worsening dyspnea found to be hypoxic 88% on RA. Patient had been suffering from progressive dyspnea worsened with exertion, orthopnea and LE edema for the past several weeks. She started new Lasix 2 weeks ago with alternating doses without any improvement. No reports of fevers/ chills/ chest pain or congestion. Outpatient CXR revealed bilateral moderate pleural effusions.   In the ED, notable labs include elevated ProBNP 8K, initial trops X 2 normal and EKG with A. fib rate controlled. CXR revealed CHF with moderate effusions.   Seen with family at bedside, dyspneic with prolonged conversation requiring ventimask for oxygenation sating 93% on 30% supplemental O2.   Aforementioned history per chart review    3/23/19  No acute pulmonary events occurred overnight       PAST MEDICAL & SURGICAL HISTORY:  Hypothyroid  Hyperlipidemia  HTN (hypertension)  AF (atrial fibrillation)    MEDICATIONS  (STANDING):  atorvastatin 10 milliGRAM(s) Oral at bedtime  diltiazem    milliGRAM(s) Oral daily  furosemide   Injectable 20 milliGRAM(s) IV Push two times a day  levothyroxine 112 MICROGram(s) Oral daily  lisinopril 5 milliGRAM(s) Oral daily  potassium chloride    Tablet ER 10 milliEquivalent(s) Oral daily  warfarin 2 milliGRAM(s) Oral at bedtime    MEDICATIONS  (PRN):  acetaminophen   Tablet .. 650 milliGRAM(s) Oral every 6 hours PRN Mild Pain (1 - 3)  docusate sodium 100 milliGRAM(s) Oral two times a day PRN Constipation  loperamide Solution 2 milliGRAM(s) Oral two times a day PRN Diarrhea  ondansetron Injectable 4 milliGRAM(s) IV Push every 4 hours PRN Nausea and/or Vomiting  senna 2 Tablet(s) Oral at bedtime PRN Constipation    REVIEW OF SYSTEM:  Dyspnea, improving, otherwise 12 point ROS Negative   Vital Signs Last 24 Hrs  T(C): 36.2 (23 Mar 2019 04:59), Max: 36.4 (22 Mar 2019 11:08)  T(F): 97.2 (23 Mar 2019 04:59), Max: 97.5 (22 Mar 2019 11:08)  HR: 82 (23 Mar 2019 04:59) (55 - 86)  BP: 119/54 (23 Mar 2019 04:59) (87/43 - 120/62)  BP(mean): --  RR: 17 (23 Mar 2019 04:59) (17 - 19)  SpO2: 97% (23 Mar 2019 04:59) (93% - 99%)  I&O's Summary    PHYSICAL EXAM  General Appearance: cooperative, no acute distress,   HEENT: PERRL, conjunctiva clear, EOM's intact, non injected pharynx, no exudate, TM   normal  Neck: Supple, , no adenopathy, thyroid: not enlarged, no carotid bruit or JVD  Back: Symmetric, no  tenderness,no soft tissue tenderness  Lungs: Diminished in the lower lobes bilaterally R>L  Heart: Regular rate and rhythm, S1, S2 normal, no murmur, rub or gallop  Abdomen: Soft, non-tender, bowel sounds active , no hepatosplenomegaly  Extremities: no cyanosis or edema, no joint swelling  Skin: Skin color, texture normal, no rashes   Neurologic: Alert and oriented X3 , cranial nerves intact, sensory and motor normal,    ECG:    LABS:                          13.4   3.75  )-----------( 123      ( 21 Mar 2019 13:12 )             42.8     -    144  |  104  |  18  ----------------------------<  81  3.3<L>   |  32<H>  |  0.59    Ca    8.1<L>      22 Mar 2019 05:26    TPro  7.3  /  Alb  3.8  /  TBili  1.1  /  DBili  x   /  AST  22  /  ALT  19  /  AlkPhos  71      CARDIAC MARKERS ( 21 Mar 2019 18:54 )  0.027 ng/mL / x     / x     / x     / x      CARDIAC MARKERS ( 21 Mar 2019 15:53 )  0.032 ng/mL / x     / x     / x     / x      CARDIAC MARKERS ( 21 Mar 2019 13:12 )  0.027 ng/mL / x     / x     / x     / x            Pro BNP  5637  @ 13:12  D Dimer  --  @ 13:12    PT/INR - ( 22 Mar 2019 05:26 )   PT: 30.7 sec;   INR: 2.68 ratio         PTT - ( 21 Mar 2019 13:12 )  PTT:39.8 sec  Urinalysis Basic - ( 21 Mar 2019 13:45 )    Color: Yellow / Appearance: Clear / S.005 / pH: x  Gluc: x / Ketone: Negative  / Bili: Negative / Urobili: Negative mg/dL   Blood: x / Protein: Negative mg/dL / Nitrite: Negative   Leuk Esterase: Negative / RBC: 3-5 /HPF / WBC Negative   Sq Epi: x / Non Sq Epi: x / Bacteria: Occasional            RADIOLOGY & ADDITIONAL STUDIES:
Patient is a 89y old  Female who presents with a chief complaint of Worsening SOB, sent in from Cardiology office for hypoxia. (21 Mar 2019 17:48)      HPI:  This is a pleasant 88 y/o female with a PMHx of CHF preserved EF (55%  on ECHO), Afib on Warfarin who was sent to the ED from cardiologist's office for worsening dyspnea found to be hypoxic 88% on RA. Patient had been suffering from progressive dyspnea worsened with exertion, orthopnea and LE edema for the past several weeks. She started new Lasix 2 weeks ago with alternating doses without any improvement. No reports of fevers/ chills/ chest pain or congestion. Outpatient CXR revealed bilateral moderate pleural effusions.   In the ED, notable labs include elevated ProBNP 8K, initial trops X 2 normal and EKG with A. fib rate controlled. CXR revealed CHF with moderate effusions.   Seen with family at bedside, dyspneic with prolonged conversation requiring ventimask for oxygenation sating 93% on 30% supplemental O2.   Aforementioned history per chart review    3/23/19  No acute pulmonary events occurred overnight     3/24/19  Yesterday, patient was noted to be hypoxemic at 84%  Awaiting ABG  No other pulmonary events occurred overnight  Potential Thoracentesis tomorrow    3/25  ABG reviewed; ABG 7.43/56/100  No acute pulmonary events occurred overnight     3/26  Refused Thoracentesis  Refused NIPPV        MEDICATIONS  (STANDING):  atorvastatin 10 milliGRAM(s) Oral at bedtime  diltiazem    milliGRAM(s) Oral daily  furosemide   Injectable 20 milliGRAM(s) IV Push three times a day  levothyroxine 112 MICROGram(s) Oral daily  lisinopril 5 milliGRAM(s) Oral daily  potassium chloride    Tablet ER 10 milliEquivalent(s) Oral daily  warfarin 2 milliGRAM(s) Oral daily    MEDICATIONS  (PRN):  acetaminophen   Tablet .. 650 milliGRAM(s) Oral every 6 hours PRN Mild Pain (1 - 3)  docusate sodium 100 milliGRAM(s) Oral two times a day PRN Constipation  loperamide Solution 2 milliGRAM(s) Oral two times a day PRN Diarrhea  ondansetron Injectable 4 milliGRAM(s) IV Push every 4 hours PRN Nausea and/or Vomiting  senna 2 Tablet(s) Oral at bedtime PRN Constipation    Vital Signs Last 24 Hrs  T(C): 36.8 (26 Mar 2019 05:15), Max: 36.8 (26 Mar 2019 05:15)  T(F): 98.2 (26 Mar 2019 05:15), Max: 98.2 (26 Mar 2019 05:15)  HR: 72 (26 Mar 2019 05:15) (51 - 74)  BP: 132/51 (26 Mar 2019 05:15) (96/53 - 132/51)  BP(mean): --  RR: 17 (25 Mar 2019 10:07) (17 - 17)  SpO2: 95% (26 Mar 2019 05:15) (94% - 97%)    PHYSICAL EXAM  General Appearance: cooperative, no acute distress,   HEENT: PERRL, conjunctiva clear, EOM's intact, non injected pharynx, no exudate, TM   normal  Neck: Supple, , no adenopathy, thyroid: not enlarged, no carotid bruit or JVD  Back: Symmetric, no  tenderness,no soft tissue tenderness  Lungs: Diminished in the lower lobes bilaterally R>L  Heart: Regular rate and rhythm, S1, S2 normal, no murmur, rub or gallop  Abdomen: Soft, non-tender, bowel sounds active , no hepatosplenomegaly  Extremities: no cyanosis or edema, no joint swelling  Skin: Skin color, texture normal, no rashes   Neurologic: Alert and oriented X3 , cranial nerves intact, sensory and motor normal,    ECG:    LABS:                          13.4   3.75  )-----------( 123      ( 21 Mar 2019 13:12 )             42.8     03-22    144  |  104  |  18  ----------------------------<  81  3.3<L>   |  32<H>  |  0.59    Ca    8.1<L>      22 Mar 2019 05:26    TPro  7.3  /  Alb  3.8  /  TBili  1.1  /  DBili  x   /  AST  22  /  ALT  19  /  AlkPhos  71  03-21    CARDIAC MARKERS ( 21 Mar 2019 18:54 )  0.027 ng/mL / x     / x     / x     / x      CARDIAC MARKERS ( 21 Mar 2019 15:53 )  0.032 ng/mL / x     / x     / x     / x      CARDIAC MARKERS ( 21 Mar 2019 13:12 )  0.027 ng/mL / x     / x     / x     / x            Pro BNP  5637  @ 13:12  D Dimer  --  @ 13:12    PT/INR - ( 22 Mar 2019 05:26 )   PT: 30.7 sec;   INR: 2.68 ratio         PTT - ( 21 Mar 2019 13:12 )  PTT:39.8 sec  Urinalysis Basic - ( 21 Mar 2019 13:45 )    Color: Yellow / Appearance: Clear / S.005 / pH: x  Gluc: x / Ketone: Negative  / Bili: Negative / Urobili: Negative mg/dL   Blood: x / Protein: Negative mg/dL / Nitrite: Negative   Leuk Esterase: Negative / RBC: 3-5 /HPF / WBC Negative   Sq Epi: x / Non Sq Epi: x / Bacteria: Occasional            RADIOLOGY & ADDITIONAL STUDIES:
Patient is a 89y old  Female who presents with a chief complaint of Worsening SOB, sent in from Cardiology office for hypoxia. (21 Mar 2019 17:48)      HPI:  This is a pleasant 89 y/o female with a PMHx of CHF preserved EF (55%  on ECHO), Afib on Warfarin who was sent to the ED from cardiologist's office for worsening dyspnea found to be hypoxic 88% on RA. Patient had been suffering from progressive dyspnea worsened with exertion, orthopnea and LE edema for the past several weeks. She started new Lasix 2 weeks ago with alternating doses without any improvement. No reports of fevers/ chills/ chest pain or congestion. Outpatient CXR revealed bilateral moderate pleural effusions.   In the ED, notable labs include elevated ProBNP 8K, initial trops X 2 normal and EKG with A. fib rate controlled. CXR revealed CHF with moderate effusions.   Seen with family at bedside, dyspneic with prolonged conversation requiring ventimask for oxygenation sating 93% on 30% supplemental O2.   Aforementioned history per chart review    3/23/19  No acute pulmonary events occurred overnight     3/24/19  Yesterday, patient was noted to be hypoxemic at 84%  Awaiting ABG  No other pulmonary events occurred overnight  Potential Thoracentesis tomorrow    3/25  ABG reviewed; ABG 7.43/56/100  No acute pulmonary events occurred overnight           MEDICATIONS  (STANDING):  atorvastatin 10 milliGRAM(s) Oral at bedtime  diltiazem    milliGRAM(s) Oral daily  furosemide   Injectable 20 milliGRAM(s) IV Push three times a day  levothyroxine 112 MICROGram(s) Oral daily  lisinopril 5 milliGRAM(s) Oral daily  potassium chloride    Tablet ER 10 milliEquivalent(s) Oral daily    MEDICATIONS  (PRN):  acetaminophen   Tablet .. 650 milliGRAM(s) Oral every 6 hours PRN Mild Pain (1 - 3)  docusate sodium 100 milliGRAM(s) Oral two times a day PRN Constipation  loperamide Solution 2 milliGRAM(s) Oral two times a day PRN Diarrhea  ondansetron Injectable 4 milliGRAM(s) IV Push every 4 hours PRN Nausea and/or Vomiting  senna 2 Tablet(s) Oral at bedtime PRN Constipation    Vital Signs Last 24 Hrs  T(C): 36.4 (25 Mar 2019 04:53), Max: 36.4 (24 Mar 2019 10:35)  T(F): 97.6 (25 Mar 2019 04:53), Max: 97.6 (25 Mar 2019 04:53)  HR: 84 (25 Mar 2019 04:53) (57 - 84)  BP: 118/46 (25 Mar 2019 04:53) (103/48 - 118/46)  BP(mean): --  RR: 16 (24 Mar 2019 10:35) (16 - 16)  SpO2: 96% (25 Mar 2019 04:53) (95% - 100%)    PHYSICAL EXAM  General Appearance: cooperative, no acute distress,   HEENT: PERRL, conjunctiva clear, EOM's intact, non injected pharynx, no exudate, TM   normal  Neck: Supple, , no adenopathy, thyroid: not enlarged, no carotid bruit or JVD  Back: Symmetric, no  tenderness,no soft tissue tenderness  Lungs: Diminished in the lower lobes bilaterally R>L  Heart: Regular rate and rhythm, S1, S2 normal, no murmur, rub or gallop  Abdomen: Soft, non-tender, bowel sounds active , no hepatosplenomegaly  Extremities: no cyanosis or edema, no joint swelling  Skin: Skin color, texture normal, no rashes   Neurologic: Alert and oriented X3 , cranial nerves intact, sensory and motor normal,    ECG:    LABS:                          13.4   3.75  )-----------( 123      ( 21 Mar 2019 13:12 )             42.8     03-    144  |  104  |  18  ----------------------------<  81  3.3<L>   |  32<H>  |  0.59    Ca    8.1<L>      22 Mar 2019 05:26    TPro  7.3  /  Alb  3.8  /  TBili  1.1  /  DBili  x   /  AST  22  /  ALT  19  /  AlkPhos  71  03-21    CARDIAC MARKERS ( 21 Mar 2019 18:54 )  0.027 ng/mL / x     / x     / x     / x      CARDIAC MARKERS ( 21 Mar 2019 15:53 )  0.032 ng/mL / x     / x     / x     / x      CARDIAC MARKERS ( 21 Mar 2019 13:12 )  0.027 ng/mL / x     / x     / x     / x            Pro BNP  5637  @ 13:12  D Dimer  --  @ 13:12    PT/INR - ( 22 Mar 2019 05:26 )   PT: 30.7 sec;   INR: 2.68 ratio         PTT - ( 21 Mar 2019 13:12 )  PTT:39.8 sec  Urinalysis Basic - ( 21 Mar 2019 13:45 )    Color: Yellow / Appearance: Clear / S.005 / pH: x  Gluc: x / Ketone: Negative  / Bili: Negative / Urobili: Negative mg/dL   Blood: x / Protein: Negative mg/dL / Nitrite: Negative   Leuk Esterase: Negative / RBC: 3-5 /HPF / WBC Negative   Sq Epi: x / Non Sq Epi: x / Bacteria: Occasional            RADIOLOGY & ADDITIONAL STUDIES:
Patient is a 89y old  Female who presents with a chief complaint of Worsening SOB, sent in from Cardiology office for hypoxia. (21 Mar 2019 17:48)      HPI:  This is a pleasant 89 y/o female with a PMHx of CHF preserved EF (55%  on ECHO), Afib on Warfarin who was sent to the ED from cardiologist's office for worsening dyspnea found to be hypoxic 88% on RA. Patient had been suffering from progressive dyspnea worsened with exertion, orthopnea and LE edema for the past several weeks. She started new Lasix 2 weeks ago with alternating doses without any improvement. No reports of fevers/ chills/ chest pain or congestion. Outpatient CXR revealed bilateral moderate pleural effusions.   In the ED, notable labs include elevated ProBNP 8K, initial trops X 2 normal and EKG with A. fib rate controlled. CXR revealed CHF with moderate effusions.   Seen with family at bedside, dyspneic with prolonged conversation requiring ventimask for oxygenation sating 93% on 30% supplemental O2.   Aforementioned history per chart review    3/23/19  No acute pulmonary events occurred overnight     3/24/19  Yesterday, patient was noted to be hypoxemic at 84%  Awaiting ABG  No other pulmonary events occurred overnight  Potential Thoracentesis tomorrow    PAST MEDICAL & SURGICAL HISTORY:  Hypothyroid  Hyperlipidemia  HTN (hypertension)  AF (atrial fibrillation)    MEDICATIONS  (STANDING):  atorvastatin 10 milliGRAM(s) Oral at bedtime  diltiazem    milliGRAM(s) Oral daily  furosemide   Injectable 20 milliGRAM(s) IV Push two times a day  levothyroxine 112 MICROGram(s) Oral daily  lisinopril 5 milliGRAM(s) Oral daily  potassium chloride    Tablet ER 10 milliEquivalent(s) Oral daily  warfarin 2 milliGRAM(s) Oral at bedtime    MEDICATIONS  (PRN):  acetaminophen   Tablet .. 650 milliGRAM(s) Oral every 6 hours PRN Mild Pain (1 - 3)  docusate sodium 100 milliGRAM(s) Oral two times a day PRN Constipation  loperamide Solution 2 milliGRAM(s) Oral two times a day PRN Diarrhea  ondansetron Injectable 4 milliGRAM(s) IV Push every 4 hours PRN Nausea and/or Vomiting  senna 2 Tablet(s) Oral at bedtime PRN Constipation    REVIEW OF SYSTEM:  Dyspnea, improving, otherwise 12 point ROS Negative   Vital Signs Last 24 Hrs  T(C): 36.2 (23 Mar 2019 04:59), Max: 36.4 (22 Mar 2019 11:08)  T(F): 97.2 (23 Mar 2019 04:59), Max: 97.5 (22 Mar 2019 11:08)  HR: 82 (23 Mar 2019 04:59) (55 - 86)  BP: 119/54 (23 Mar 2019 04:59) (87/43 - 120/62)  BP(mean): --  RR: 17 (23 Mar 2019 04:59) (17 - 19)  SpO2: 97% (23 Mar 2019 04:59) (93% - 99%)  I&O's Summary    PHYSICAL EXAM  General Appearance: cooperative, no acute distress,   HEENT: PERRL, conjunctiva clear, EOM's intact, non injected pharynx, no exudate, TM   normal  Neck: Supple, , no adenopathy, thyroid: not enlarged, no carotid bruit or JVD  Back: Symmetric, no  tenderness,no soft tissue tenderness  Lungs: Diminished in the lower lobes bilaterally R>L  Heart: Regular rate and rhythm, S1, S2 normal, no murmur, rub or gallop  Abdomen: Soft, non-tender, bowel sounds active , no hepatosplenomegaly  Extremities: no cyanosis or edema, no joint swelling  Skin: Skin color, texture normal, no rashes   Neurologic: Alert and oriented X3 , cranial nerves intact, sensory and motor normal,    ECG:    LABS:                          13.4   3.75  )-----------( 123      ( 21 Mar 2019 13:12 )             42.8     03-22    144  |  104  |  18  ----------------------------<  81  3.3<L>   |  32<H>  |  0.59    Ca    8.1<L>      22 Mar 2019 05:26    TPro  7.3  /  Alb  3.8  /  TBili  1.1  /  DBili  x   /  AST  22  /  ALT  19  /  AlkPhos  71  03-21    CARDIAC MARKERS ( 21 Mar 2019 18:54 )  0.027 ng/mL / x     / x     / x     / x      CARDIAC MARKERS ( 21 Mar 2019 15:53 )  0.032 ng/mL / x     / x     / x     / x      CARDIAC MARKERS ( 21 Mar 2019 13:12 )  0.027 ng/mL / x     / x     / x     / x            Pro BNP  5637  @ 13:12  D Dimer  --  @ 13:12    PT/INR - ( 22 Mar 2019 05:26 )   PT: 30.7 sec;   INR: 2.68 ratio         PTT - ( 21 Mar 2019 13:12 )  PTT:39.8 sec  Urinalysis Basic - ( 21 Mar 2019 13:45 )    Color: Yellow / Appearance: Clear / S.005 / pH: x  Gluc: x / Ketone: Negative  / Bili: Negative / Urobili: Negative mg/dL   Blood: x / Protein: Negative mg/dL / Nitrite: Negative   Leuk Esterase: Negative / RBC: 3-5 /HPF / WBC Negative   Sq Epi: x / Non Sq Epi: x / Bacteria: Occasional            RADIOLOGY & ADDITIONAL STUDIES:
Pt seen and examined. SOB improving on Lasix  Pt ambulating.  Cont to feel better  ________________________________  REVIEW OF SYSTEMS  A 10 point review of system has been performed, and is negative except for what has been mentioned in the above history of present illness.     MEDICATIONS  (STANDING):  atorvastatin 10 milliGRAM(s) Oral at bedtime  diltiazem    milliGRAM(s) Oral daily  furosemide   Injectable 20 milliGRAM(s) IV Push three times a day  levothyroxine 112 MICROGram(s) Oral daily  lisinopril 5 milliGRAM(s) Oral daily  potassium chloride    Tablet ER 10 milliEquivalent(s) Oral daily  warfarin 2 milliGRAM(s) Oral daily    MEDICATIONS  (PRN):  acetaminophen   Tablet .. 650 milliGRAM(s) Oral every 6 hours PRN Mild Pain (1 - 3)  docusate sodium 100 milliGRAM(s) Oral two times a day PRN Constipation  loperamide Solution 2 milliGRAM(s) Oral two times a day PRN Diarrhea  ondansetron Injectable 4 milliGRAM(s) IV Push every 4 hours PRN Nausea and/or Vomiting  senna 2 Tablet(s) Oral at bedtime PRN Constipation      Vital Signs Last 24 Hrs  T(C): 36.2 (26 Mar 2019 10:48), Max: 36.8 (26 Mar 2019 05:15)  T(F): 97.2 (26 Mar 2019 10:48), Max: 98.2 (26 Mar 2019 05:15)  HR: 61 (26 Mar 2019 10:48) (61 - 74)  BP: 113/52 (26 Mar 2019 10:48) (104/53 - 132/51)  BP(mean): --  RR: 18 (26 Mar 2019 10:48) (18 - 18)  SpO2: 95% (26 Mar 2019 10:48) (95% - 97%)  I&O's Summary      GENERAL APPEARANCE:  No acute distress  HEAD: normocephalic, atraumatic  NECK: supple, no jugular venous distention, no carotid bruit  HEART:  irregularly irregular, S1, S2 normal 1/6 systolic murmur  CHEST:  No anterior chest wall tenderness  LUNGS:  crackles with decrease breath sounds at bases  ABDOMEN soft, nontender, nondistended, with positive bowel sounds appreciated  EXTREMITIES: no clubbing, cyanosis, LE edema and chronic changes  NEURO:  Alert and oriented x3  PSYC:  Normal affect  SKIN:  Dry    ________________________________  TELEMETRY: Afib w/ controlled     LABS:                        13.4   3.75  )-----------( 123      ( 21 Mar 2019 13:12 )             42.8                 144  |  104  |  18  ----------------------------<  81  3.3<L>   |  32<H>  |  0.59    Ca    8.1<L>      22 Mar 2019 05:26    TPro  7.3  /  Alb  3.8  /  TBili  1.1  /  DBili  x   /  AST  22  /  ALT  19  /  AlkPhos  71   @ 18:54  Trop-I  0.027  CK      --  CK-MB   --     @ 15:53  Trop-I  0.032  CK      --  CK-MB   --     @ 13:12  Trop-I  0.027  CK      --  CK-MB   --    Pro BNP  5637  @ 13:12  D Dimer  --  @ 13:12    PT/INR - ( 22 Mar 2019 05:26 )   PT: 30.7 sec;   INR: 2.68 ratio         PTT - ( 21 Mar 2019 13:12 )  PTT:39.8 sec  Urinalysis Basic - ( 21 Mar 2019 13:45 )    Color: Yellow / Appearance: Clear / S.005 / pH: x  Gluc: x / Ketone: Negative  / Bili: Negative / Urobili: Negative mg/dL   Blood: x / Protein: Negative mg/dL / Nitrite: Negative   Leuk Esterase: Negative / RBC: 3-5 /HPF / WBC Negative   Sq Epi: x / Non Sq Epi: x / Bacteria: Occasional
Pt seen and examined. SOB improving on Lasix  Pt ambulating.  ________________________________  REVIEW OF SYSTEMS  A 10 point review of system has been performed, and is negative except for what has been mentioned in the above history of present illness.     MEDICATIONS  (STANDING):  atorvastatin 10 milliGRAM(s) Oral at bedtime  diltiazem    milliGRAM(s) Oral daily  furosemide   Injectable 20 milliGRAM(s) IV Push three times a day  levothyroxine 112 MICROGram(s) Oral daily  lisinopril 5 milliGRAM(s) Oral daily  potassium chloride    Tablet ER 10 milliEquivalent(s) Oral daily    MEDICATIONS  (PRN):  acetaminophen   Tablet .. 650 milliGRAM(s) Oral every 6 hours PRN Mild Pain (1 - 3)  docusate sodium 100 milliGRAM(s) Oral two times a day PRN Constipation  loperamide Solution 2 milliGRAM(s) Oral two times a day PRN Diarrhea  ondansetron Injectable 4 milliGRAM(s) IV Push every 4 hours PRN Nausea and/or Vomiting  senna 2 Tablet(s) Oral at bedtime PRN Constipation    Vital Signs Last 24 Hrs  T(C): 36.4 (25 Mar 2019 04:53), Max: 36.4 (24 Mar 2019 10:35)  T(F): 97.6 (25 Mar 2019 04:53), Max: 97.6 (25 Mar 2019 04:53)  HR: 84 (25 Mar 2019 04:53) (57 - 84)  BP: 118/46 (25 Mar 2019 04:53) (103/48 - 118/46)  BP(mean): --  RR: 16 (24 Mar 2019 10:35) (16 - 16)  SpO2: 96% (25 Mar 2019 04:53) (95% - 100%)  I&O's Summary    24 Mar 2019 07:01  -  25 Mar 2019 07:00  --------------------------------------------------------  IN: 0 mL / OUT: 900 mL / NET: -900 mL    GENERAL APPEARANCE:  No acute distress  HEAD: normocephalic, atraumatic  NECK: supple, no jugular venous distention, no carotid bruit  HEART:  irregularly irregular, S1, S2 normal 1/6 systolic murmur  CHEST:  No anterior chest wall tenderness  LUNGS:  crackles with decrease breath sounds at bases  ABDOMEN soft, nontender, nondistended, with positive bowel sounds appreciated  EXTREMITIES: no clubbing, cyanosis, LE edema and chronic changes  NEURO:  Alert and oriented x3  PSYC:  Normal affect  SKIN:  Dry    ________________________________  TELEMETRY: Afib w/ controlled     LABS:                        13.4   3.75  )-----------( 123      ( 21 Mar 2019 13:12 )             42.8                 144  |  104  |  18  ----------------------------<  81  3.3<L>   |  32<H>  |  0.59    Ca    8.1<L>      22 Mar 2019 05:26    TPro  7.3  /  Alb  3.8  /  TBili  1.1  /  DBili  x   /  AST  22  /  ALT  19  /  AlkPhos  71   @ 18:54  Trop-I  0.027  CK      --  CK-MB   --     @ 15:53  Trop-I  0.032  CK      --  CK-MB   --     @ 13:12  Trop-I  0.027  CK      --  CK-MB   --    Pro BNP  5637  @ 13:12  D Dimer  --  @ 13:12    PT/INR - ( 22 Mar 2019 05:26 )   PT: 30.7 sec;   INR: 2.68 ratio         PTT - ( 21 Mar 2019 13:12 )  PTT:39.8 sec  Urinalysis Basic - ( 21 Mar 2019 13:45 )    Color: Yellow / Appearance: Clear / S.005 / pH: x  Gluc: x / Ketone: Negative  / Bili: Negative / Urobili: Negative mg/dL   Blood: x / Protein: Negative mg/dL / Nitrite: Negative   Leuk Esterase: Negative / RBC: 3-5 /HPF / WBC Negative   Sq Epi: x / Non Sq Epi: x / Bacteria: Occasional    EKG: Per my interpretation, atrial fibrillation with a moderate ventricular response at 103 bpm. Normal axis. Nonspecific ST-T wave changes. Voltage criteria consistent with LVH.
INTERVAL HPI/OVERNIGHT EVENTS:   This is a pleasant 89 y/o female with a PMHx of CHF preserved EF (55% 12/18 on ECHO), Afib on Warfarin who was sent to the ED from cardiologist's office for worsening dyspnea found to be hypoxic 88% on RA.   In the ED, notable labs include elevated ProBNP 8K, initial trops X 2 normal and EKG with A. fib rate controlled. CXR revealed CHF with moderate effusions and needed Venti mask. .     Hospital course: admitted for acute on chronic diastolic CHF responsive to IV lasix. Initially became hypotensive thus BP meds (Lisinopril and Diltiazem lowered).     3/23: SOB this AM hypoxic to 84% on RA, no CP. Was planning dc but now on hold for further inpatient diuresis.   3/24/19- Patient seen and examined at bedside. Son present. Patient states she is feeling well, states breathing is improving, able to take deeper breaths. No acute events overnight.  3/25/19- Patient seen and examined at bedside. Son present at bedside. Patient and son refusing thoracentesis at this time. Patient states she feels well, denies any dyspnea or CP.    MEDICATIONS  (STANDING):  atorvastatin 10 milliGRAM(s) Oral at bedtime  diltiazem    milliGRAM(s) Oral daily  furosemide   Injectable 20 milliGRAM(s) IV Push three times a day  levothyroxine 112 MICROGram(s) Oral daily  lisinopril 5 milliGRAM(s) Oral daily  potassium chloride    Tablet ER 10 milliEquivalent(s) Oral daily  warfarin 2 milliGRAM(s) Oral daily    MEDICATIONS  (PRN):  acetaminophen   Tablet .. 650 milliGRAM(s) Oral every 6 hours PRN Mild Pain (1 - 3)  docusate sodium 100 milliGRAM(s) Oral two times a day PRN Constipation  loperamide Solution 2 milliGRAM(s) Oral two times a day PRN Diarrhea  ondansetron Injectable 4 milliGRAM(s) IV Push every 4 hours PRN Nausea and/or Vomiting  senna 2 Tablet(s) Oral at bedtime PRN Constipation      Allergies    Bactrim (Unknown)    Intolerances      ROS:  CONSTITUTIONAL: + weakness, no fevers or chills  EYES/ENT: No visual changes;  No vertigo or throat pain   NECK: No pain or stiffness  RESPIRATORY: No cough, wheezing, hemoptysis; + shortness of breath on exertion  CARDIOVASCULAR: No chest pain or palpitations  GASTROINTESTINAL: No abdominal or epigastric pain. No nausea, vomiting, or hematemesis  GENITOURINARY: No dysuria, frequency or hematuria  NEUROLOGICAL: No numbness  SKIN: No itching, burning, rashes, or lesions   All other review of systems is negative unless indicated above.    Vital Signs Last 24 Hrs  T(C): 36.4 (25 Mar 2019 10:07), Max: 36.4 (24 Mar 2019 16:32)  T(F): 97.5 (25 Mar 2019 10:07), Max: 97.6 (25 Mar 2019 04:53)  HR: 51 (25 Mar 2019 10:07) (51 - 84)  BP: 96/53 (25 Mar 2019 10:07) (96/53 - 118/46)  BP(mean): --  RR: 17 (25 Mar 2019 10:07) (17 - 17)  SpO2: 94% (25 Mar 2019 10:07) (94% - 100%)    03-24 @ 07:01  -  03-25 @ 07:00  --------------------------------------------------------  IN: 0 mL / OUT: 900 mL / NET: -900 mL      Physical Exam:  General: WN/WD NAD  Neurology: A&Ox3, nonfocal, HAMILTON x 4  Respiratory: dec breath sounds B/L bases, no wheezing  CV: irregularly irregular, S1S2, no murmurs, rubs or gallops  Abdominal: Soft, NT, ND +BS  Extremities: B/L LE wrapped with ACE for compression, + peripheral pulses      LABS:                        12.8   2.48  )-----------( 115      ( 25 Mar 2019 05:28 )             40.6     03-25    144  |  101  |  25<H>  ----------------------------<  91  4.4   |  39<H>  |  0.80    Ca    8.5      25 Mar 2019 05:28      PT/INR - ( 25 Mar 2019 05:28 )   PT: 22.3 sec;   INR: 1.97 ratio               RADIOLOGY & ADDITIONAL TESTS:

## 2019-03-26 NOTE — DISCHARGE NOTE NURSING/CASE MANAGEMENT/SOCIAL WORK - NSDCPEPT PROEDHF_GEN_ALL_CORE
Report signs and symptoms to primary care provider/Activities as tolerated/Low salt diet/Monitor weight daily/Call primary care provider for follow up after discharge

## 2019-03-26 NOTE — DISCHARGE NOTE PROVIDER - HOSPITAL COURSE
This is a pleasant 88 y/o female with a PMHx of CHF preserved EF (55% 12/18 on ECHO), Afib on Warfarin who was sent to the ED from cardiologist's office for worsening dyspnea found to be hypoxic 88% on RA.     In the ED, notable labs include elevated ProBNP 8K, initial trops X 2 normal and EKG with A. fib rate controlled. CXR revealed CHF with moderate effusions and needed Venti mask. .         Hospital course: admitted for acute on chronic diastolic CHF responsive to IV lasix. Initially became hypotensive thus BP meds (Lisinopril and Diltiazem lowered).         3/23: SOB this AM hypoxic to 84% on RA, no CP. Was planning dc but now on hold for further inpatient diuresis.     3/24/19- Patient seen and examined at bedside. Son present. Patient states she is feeling well, states breathing is improving, able to take deeper breaths. No acute events overnight.    3/25/19- Patient seen and examined at bedside. Son present at bedside. Patient and son refusing thoracentesis at this time. Patient states she feels well, denies any dyspnea or CP.    3/26/19- Patient seen and examined at bedside. Son present at bedside. Patient unable to tolerate BiPAP overnight. Spoke with Pulm, patient to follow up as outpatient. Patient wants to go home.        Physical Exam:    General: WN/WD NAD    Neurology: A&Ox3, nonfocal, HAMILTON x 4    Respiratory: dec breath sounds B/L bases, no wheezing    CV: irregularly irregular, S1S2, no murmurs, rubs or gallops    Abdominal: Soft, NT, ND +BS    Extremities: B/L LE wrapped with ACE for compression, + peripheral pulses        This is a pleasant 88 y/o female with a PMHx of CHF preserved EF (55% 12/18 on ECHO), Afib on Warfarin admitted for acute hypoxic respiratory failure secondary to acute on chronic diastolic CHF exacerbation:         #Acute Hypoxic Respiratory Failure    #Acute on Chronic Diastolic CHF Exacerbation - preserved EF    #Bilateral Moderate Pleural effusions    #Hypotension - resolved after dc BP meds.    - DC plan on hold --> remain inpatient for continued IV diuresis.     - increase Lasix back to 20mg TID IV, monitor I's/O's, daily weights. spoke with cardio, can discharge on bumex 1mg BID    - fluid restrict to 1.5L    - ECHO done 3 months ago, moderate pulmonary HTN noted, severe LA dilatation.     - CT chest with bilateral moderate pleural effusions --> appreciate pulm input.      - not on home O2, attempt to wean off otherwise will need ambulatory pulse ox prior to discharge.     - no signs of sepsis.    - fall precautions and PT needed.        #Chronic A. fib - currently rate controlled on Diltiazem lowered to 180mg.     - INR 1.63 today, patient refusing thoracentesis, continue coumadin- goal 2-3    - on statin.        #Hypothyroidism - continue home does Synthroid.         #Suspected Severe protein calorie malnutrition - consulted nutrition.     - denies dysphagia issues.         #DVT ppx: on coumadin        Case discussed in depth with son, patient, Cardiologist and Pulm.    Code Status: advanced goals of care discussed and patient would like to be DNR/DNI.         Total time spent on discharge: 44 minutes

## 2019-03-26 NOTE — DISCHARGE NOTE PROVIDER - PROVIDER TOKENS
PROVIDER:[TOKEN:[[id number]],FOLLOWUP:[1-3 days]],PROVIDER:[TOKEN:[[id number]],FOLLOWUP:[2 weeks]],PROVIDER:[TOKEN:[[id number]],FOLLOWUP:[2 weeks]]

## 2019-03-26 NOTE — PROGRESS NOTE ADULT - ASSESSMENT
This is a pleasant 89 y/o female with a PMHx of CHF preserved EF (55% 12/18 on ECHO), Afib on Warfarin admitted for acute hypoxic respiratory failure secondary to acute on chronic diastolic CHF exacerbation:     # Acute Hypoxic Respiratory Failure  # Acute on Chronic Diastolic CHF Exacerbation - preserved EF  # Bilateral Moderate Pleural effusions  # Hypotension - resolved after dc BP meds.  - DC plan on hold --> remain inpatient for continued IV diuresis.   - increase Lasix back to 20mg TID IV, monitor I's/O's, daily weights. can switch to PO 40mg on dc  - fluid restrict to 1.5L  - ECHO done 3 months ago, moderate pulmonary HTN noted, severe LA dilatation.   - CT chest with bilateral moderate pleural effusions --> appreciate pulm input.    - not on home O2, attempt to wean off otherwise will need ambulatory pulse ox prior to discharge.   - no signs of sepsis.  - fall precautions and PT needed.    # Chronic A. fib - currently rate controlled on Diltiazem lowered to 180mg.   - INR 1.97 today, patient refusing thoracentesis, can resume coumadin- goal 2-3  - on statin.    # Hypothyroidism - continue home does Synthroid.     # Suspected Severe protein calorie malnutrition - consulted nutrition.   - denies dysphagia issues.     DVT ppx: therapeutic on coumadin  Dispo: remain inpatient on telemetry.     Case discussed in depth with sons, patient and Cardiologist and Pulm.  Code Status: advanced goals of care discussed and patient would like to be DNR/DNI.
This is a pleasant 89 y/o female with a PMHx of CHF preserved EF (55% 12/18 on ECHO), Afib on Warfarin admitted for acute hypoxic respiratory failure secondary to acute on chronic diastolic CHF exacerbation:     # Acute Hypoxic Respiratory Failure  # Acute on Chronic Diastolic CHF Exacerbation - preserved EF  # Bilateral Moderate Pleural effusions  # Hypotension - resolved after dc BP meds.  - DC plan on hold --> remain inpatient for continued IV diuresis.   - increase Lasix back to 20mg TID IV, monitor I's/O's, daily weights.   - fluid restrict to 1.5L  - ECHO done 3 months ago, moderate pulmonary HTN noted, severe LA dilatation.   - CT chest with bilateral moderate pleural effusions --> appreciate pulm input.   - STOP coumadin in case patient to get thoracentesis on Monday if no improvement noted with IV lasix.   - not on home O2, attempt to wean off otherwise will need ambulatory pulse ox prior to discharge.   - no signs of sepsis.  - fall precautions and PT needed.    # Chronic A. fib - currently rate controlled on Diltiazem lowered to 180mg.   - INR therapeutic, but now holding coumadin.   - on statin.    # Hypothyroidism - continue home does Synthroid.     # Suspected Severe protein calorie malnutrition - consulted nutrition.   - denies dysphagia issues.     DVT ppx: therapeutic on coumadin  Dispo: remain inpatient on telemetry.     Case discussed in depth with sons, patient and Cardiologist and Pulm.  Code Status: advanced goals of care discussed and patient would like to be DNR/DNI.
1) Bilateral Pleural Effusion  2) ADHF  3) Abnormal CT Chest  4) Dyspnea  88 y/o female with a PMHx of CHF preserved EF (55% 12/18 on ECHO), Afib on Warfarin who was sent to the ED from cardiologist's office for worsening dyspnea found to be hypoxic 88% on RA.  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
1) Bilateral Pleural Effusion  2) ADHF  3) Abnormal CT Chest  4) Dyspnea  88 y/o female with a PMHx of CHF preserved EF (55% 12/18 on ECHO), Afib on Warfarin who was sent to the ED from cardiologist's office for worsening dyspnea found to be hypoxic 88% on RA.  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
1) Bilateral Pleural Effusion  2) ADHF  3) Abnormal CT Chest  4) Dyspnea  88 y/o female with a PMHx of CHF preserved EF (55% 12/18 on ECHO), Afib on Warfarin who was sent to the ED from cardiologist's office for worsening dyspnea found to be hypoxic 88% on RA.  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
1) Bilateral Pleural Effusion  2) ADHF  3) Abnormal CT Chest  4) Dyspnea  89 y/o female with a PMHx of CHF preserved EF (55% 12/18 on ECHO), Afib on Warfarin who was sent to the ED from cardiologist's office for worsening dyspnea found to be hypoxic 88% on RA.  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
ASSESSMENT: This patient presents for evaluation of shortness of breath from our office.    Acute on chronic diastolic heart failure  Pleural Effusion on CT  Chronic atrial fibrillation on anticoagulation  Chronic venous insufficiency  Hypertension  Hyperlipidemia         PLAN:  Continue with diuretics. Pulm eval noted   Hold AC - may need tap.  Cont current BP meds - hr stable on BB  Continue monitor renal function and electrolytes.  DVT and GI ppx    d/w PCP/pulm and Son    Thank you for allowing me to participate in the care of your patient.  Please contact me should any questions arise.    ALFONZO Ayala DO, FACC
ASSESSMENT: This patient presents for evaluation of shortness of breath from our office.    Acute on chronic diastolic heart failure  Pleural Effusion on CT  Chronic atrial fibrillation on anticoagulation - CHADSVASc 4  Chronic venous insufficiency  Hypertension  Hyperlipidemia       PLAN:  Continue with diuretics but switch to oral with 1mg of bumex bid with BMP later this week in our office.   D/w Pulm and pt - no plan for thoracentesis - out pt pulm FU  Continue with anticoagulation, with INR goal of 2-3 for history of atrial fibrillation  Cont current BP meds - hr stable on Beta blocker    DVT and GI ppx    d/w pulm/pcp and Son    Thank you for allowing me to participate in the care of your patient.  Please contact me should any questions arise.    ALFONZO Ayala DO, FACC
ASSESSMENT: This patient presents for evaluation of shortness of breath from our office.    Acute on chronic diastolic heart failure  Pleural Effusion on CT  Chronic atrial fibrillation on anticoagulation - CHADSVASc 4  Chronic venous insufficiency  Hypertension  Hyperlipidemia       PLAN:  Continue with diuretics. Recommend switching to 40 mg daily at discharge.   D/w Pulm and pt - no plan for thoracentesis  Continue with anticoagulation, with INR goal of 2-3 for history of atrial fibrillation   Cont current BP meds - hr stable on BB  Continue monitor renal function and electrolytes.  DVT and GI ppx    d/w pulm and Son    Thank you for allowing me to participate in the care of your patient.  Please contact me should any questions arise.    ALFONZO Ayala DO, FACC

## 2019-03-26 NOTE — DISCHARGE NOTE PROVIDER - CARE PROVIDER_API CALL
Robbie Ayala (DO; SHIRA)  Cardiology  [address] [place] [address]  Phone: [phone]  Fax: [phone]  Follow Up Time: 1-3 days    Neto Barber)  Internal Medicine  [address] [place] [address]  Phone: [phone]  Fax: [phone]  Follow Up Time: 2 weeks    Zaria Ring)  Internal Medicine  [address] [place] [address]  Phone: [phone]  Fax: [phone]  Follow Up Time: 2 weeks

## 2019-03-26 NOTE — DIETITIAN INITIAL EVALUATION ADULT. - PERTINENT MEDS FT
MEDICATIONS  (STANDING):  atorvastatin 10 milliGRAM(s) Oral at bedtime  diltiazem    milliGRAM(s) Oral daily  furosemide   Injectable 20 milliGRAM(s) IV Push three times a day  levothyroxine 112 MICROGram(s) Oral daily  lisinopril 5 milliGRAM(s) Oral daily  potassium chloride    Tablet ER 10 milliEquivalent(s) Oral daily  warfarin 2 milliGRAM(s) Oral daily    MEDICATIONS  (PRN):  acetaminophen   Tablet .. 650 milliGRAM(s) Oral every 6 hours PRN Mild Pain (1 - 3)  docusate sodium 100 milliGRAM(s) Oral two times a day PRN Constipation  loperamide Solution 2 milliGRAM(s) Oral two times a day PRN Diarrhea  ondansetron Injectable 4 milliGRAM(s) IV Push every 4 hours PRN Nausea and/or Vomiting  senna 2 Tablet(s) Oral at bedtime PRN Constipation

## 2019-03-26 NOTE — DISCHARGE NOTE PROVIDER - NSDCCPCAREPLAN_GEN_ALL_CORE_FT
PRINCIPAL DISCHARGE DIAGNOSIS  Diagnosis: Acute on chronic congestive heart failure, unspecified heart failure type  Assessment and Plan of Treatment: -Preserved EF, breathing overall improved  -Continue O2 PRN at home  -Started on bumex 1mg twice a day  -Follow up with Cardiology within 1 week of discharge  -Continue coumadin for afib- repeat INR check within 1-3 days      SECONDARY DISCHARGE DIAGNOSES  Diagnosis: Hypoxia  Assessment and Plan of Treatment: -Improved  -Continue O2 at home as needed  -Follow up with Pulm within 2 weeks of discharge    Diagnosis: Pleural effusion, bilateral  Assessment and Plan of Treatment: -Continue bumex 1mg twice a day  -Follow up with Pulm within 2 weeks of discharge

## 2019-03-29 DIAGNOSIS — I48.91 UNSPECIFIED ATRIAL FIBRILLATION: ICD-10-CM

## 2019-03-29 DIAGNOSIS — I11.0 HYPERTENSIVE HEART DISEASE WITH HEART FAILURE: ICD-10-CM

## 2019-03-29 DIAGNOSIS — E43 UNSPECIFIED SEVERE PROTEIN-CALORIE MALNUTRITION: ICD-10-CM

## 2019-03-29 DIAGNOSIS — E78.5 HYPERLIPIDEMIA, UNSPECIFIED: ICD-10-CM

## 2019-03-29 DIAGNOSIS — E03.9 HYPOTHYROIDISM, UNSPECIFIED: ICD-10-CM

## 2019-03-29 DIAGNOSIS — J96.01 ACUTE RESPIRATORY FAILURE WITH HYPOXIA: ICD-10-CM

## 2019-03-29 DIAGNOSIS — I48.2 CHRONIC ATRIAL FIBRILLATION: ICD-10-CM

## 2019-03-29 DIAGNOSIS — I50.33 ACUTE ON CHRONIC DIASTOLIC (CONGESTIVE) HEART FAILURE: ICD-10-CM

## 2019-03-29 DIAGNOSIS — I95.9 HYPOTENSION, UNSPECIFIED: ICD-10-CM

## 2019-03-29 DIAGNOSIS — J44.9 CHRONIC OBSTRUCTIVE PULMONARY DISEASE, UNSPECIFIED: ICD-10-CM

## 2019-03-29 DIAGNOSIS — Z85.828 PERSONAL HISTORY OF OTHER MALIGNANT NEOPLASM OF SKIN: ICD-10-CM

## 2019-04-16 NOTE — PATIENT PROFILE ADULT - NSASFUNCLEVELADLAMBULATE_GEN_A_NUR
<Roxy Zarate M - Last Filed: 04/16/19 19:14>





H&P History of Present Illness





- General


Date of Service: 04/16/19


Admit Problem/Dx: 


 Admission Diagnosis/Problem





Admission Diagnosis/Problem      Syncope and fall.








Source of Information: Patient (poor historian)


History Limitations: Reports: No Limitations





- History of Present Illness


Initial Comments - Free Text/Narative: 





This 76 year old female with extensive history including Afib on anticoagulation

, DVTs, HTN, and self caths presents to the ED via EMS after she laid in her 

bathtub overnight. She reports the last thing remembers was getting up to go to 

the bathroom. She reports she woke up in her bathtub and thinks she hit her 

head. She denies pain anywhere currently. She reports prior to this she was 

feeling well except for a little sinus congestion. She denies any headache or 

visual concerns, no lightheadedness or dizziness. She denies chest pain or 

palpitations. No shortness of breath or abdominal pain. She reports she is 

producing urine, she reports self cathing. She denies black or bloody BMs. No 

diarrhea or constipation. She lives alone, but her son comes to check on her. 





In the ED, mild leukocytosis noted 11,970, hgb 9.9, Platelets 427, BUN 34 and 

Cr 2.3. Troponin negative. Tox screen negative and UA neg. Head CT was obtained 

and reveals no acute intracranial findings, possible acute sinusitis. CXR 

negative and pelvix Xray negative. She will be admitted due to syncope.





PCP, Dr Garcia





- Related Data


Allergies/Adverse Reactions: 


 Allergies











Allergy/AdvReac Type Severity Reaction Status Date / Time


 


codeine Allergy  Nausea and Verified 03/23/19 13:37





   Vomiting  


 


codeine Allergy  Anaphylactic Uncoded 04/16/19 17:56





   Shock  











Home Medications: 


 Home Meds





Apixaban [Eliquis] 10 mg PO BID 04/16/19 [History]


Aspirin [Guilherme Chewable Aspirin] 81 mg PO DAILY 04/16/19 [History]


Chlorthalidone 25 mg PO DAILY 04/16/19 [History]


Fenofibrate 145 mg PO DAILY 04/16/19 [History]


Metaxalone [Skelaxin] 800 mg PO BEDTIME 04/16/19 [History]


Metoprolol Succinate [Kapspargo Sprinkle] 25 mg PO DAILY 04/16/19 [History]


Potassium Chloride 10 meq PO DAILY 04/16/19 [History]


Ramipril [Altace] 5 mg PO DAILY 04/16/19 [History]


Rosuvastatin Calcium 20 mg PO DAILY 04/16/19 [History]


Zolpidem Tartrate [Edluar] 1 tab PO BEDTIME 04/16/19 [History]











Past Medical History





- Past Health History


Medical/Surgical History: Denies Medical/Surgical History


HEENT History: Reports: None


Cardiovascular History: Reports: Afib, Blood Clots/VTE/DVT, High Cholesterol, 

Hypertension, Syncope.  Denies: Heart Failure, MI


Respiratory History: Reports: None.  Denies: COPD, SOB


Gastrointestinal History: Reports: None.  Denies: GERD, GI Bleed


Genitourinary History: Reports: Retention, Urinary (self caths)


OB/GYN History: Reports: None


Musculoskeletal History: Reports: None


Neurological History: Reports: None.  Denies: CVA, TIA


Psychiatric History: Reports: None


Endocrine/Metabolic History: Reports: None.  Denies: Diabetes, Type II


Hematologic History: Reports: Anticoagulation Therapy


Immunologic History: Reports: None


Oncologic (Cancer) History: Reports: None


Dermatologic History: Reports: None





- Infectious Disease History


Infectious Disease History: Reports: Chicken Pox, Measles, Mumps





- Past Surgical History


Cardiovascular Surgical History: Reports: Other (See Below) (reports stents in 

her legs from clots)


Female  Surgical History: Reports: Hysterectomy





Social & Family History





- Family History


Family Medical History: Noncontributory





- Tobacco Use


Smoking Status *Q: Former Smoker


Used Tobacco, but Quit: Yes


Month/Year Tobacco Last Used: quit 3-4 years ago





- Caffeine Use


Caffeine Use: Reports: Soda, Tea





- Alcohol Use


Alcohol Use History: No





- Living Situation & Occupation


Living situation: Reports: Alone


Occupation: Retired





H&P Review of Systems





- Review of Systems:


Review Of Systems: See Below


General: Reports: No Symptoms.  Denies: Fever, Chills, Malaise, Fatigue


HEENT: Reports: Sinus Congestion.  Denies: Ear Pain, Eye Pain, Headaches, Sore 

Throat, Vertigo, Visual Changes


Cardiovascular: Reports: Syncope.  Denies: Chest Pain, Palpitations, Edema, 

Lightheadedness


Gastrointestinal: Reports: No Symptoms.  Denies: Abdominal Pain, Black Stool, 

Bloody Stool, Decreased Appetite, Flatus, Nausea, Vomiting


Genitourinary: Reports: Retention (at baseline, self caths).  Denies: Burning, 

Pain, Urgency, Flank Pain


Musculoskeletal: Reports: No Symptoms


Skin: Reports: No Symptoms


Psychiatric: Reports: No Symptoms


Neurological: Reports: No Symptoms


Hematologic/Lymphatic: Reports: No Symptoms


Immunologic: Reports: No Symptoms





Exam





- Exam


Exam: See Below





- Vital Signs


Vital Signs: 


 Last Vital Signs











Temp  97.3 F   04/16/19 13:35


 


Pulse  80   04/16/19 15:00


 


Resp  16   04/16/19 15:00


 


BP  122/41 L  04/16/19 15:00


 


Pulse Ox  99   04/16/19 15:00











Weight: 71.1 kg





- Exam


General: Alert, Oriented, Other (pale)


HEENT: Conjunctiva Clear, Mucosa Moist & Pink, Posterior Pharynx Clear, Pupils 

Equal, Pupils Reactive


Neck: Supple


Lungs: Clear to Auscultation, Normal Respiratory Effort


Cardiovascular: Regular Rate, Regular Rhythm.  No: Irregular Rhythm, Systolic 

Murmur


GI/Abdominal Exam: Normal Bowel Sounds, Soft, Non-Tender, No Mass


Back Exam: Normal Inspection, Full Range of Motion


Extremities: Normal Inspection, Normal Range of Motion, Non-Tender, No Pedal 

Edema


Skin: Warm, Dry, Ecchymosis (bruising noted to lateral R lower leg, just below 

knee.)


Neuro Extensive - Mental Status: Alert, Oriented x3


Neuro Extensive - Motor, Sensory, Reflexes: CN II-XII Intact


Psychiatric: Alert, Normal Affect, Normal Mood





- Patient Data


Lab Results Last 24 hrs: 


 Laboratory Results - last 24 hr











  04/16/19 04/16/19 04/16/19 Range/Units





  14:20 14:20 14:20 


 


WBC  11.97 H    (4.0-11.0)  K/uL


 


RBC  3.20 L    (4.30-5.90)  M/uL


 


Hgb  9.9 L    (12.0-16.0)  g/dL


 


Hct  30.6 L    (36.0-46.0)  %


 


MCV  95.6    (80.0-98.0)  fL


 


MCH  30.9    (27.0-32.0)  pg


 


MCHC  32.4    (31.0-37.0)  g/dL


 


RDW Std Deviation  52.0    (28.0-62.0)  fl


 


RDW Coeff of Charlee  15    (11.0-15.0)  %


 


Plt Count  427 H    (150-400)  K/uL


 


MPV  10.20    (7.40-12.00)  fL


 


Add Manual Diff  YES    


 


Neutrophils % (Manual)  77    (48.0-80.0)  %


 


Band Neutrophils %  6    %


 


Lymphocytes % (Manual)  9 L    (16.0-40.0)  %


 


Monocytes % (Manual)  5    (0.0-15.0)  %


 


Eosinophils % (Manual)  3    (0.0-7.0)  %


 


Nucleated RBC %  0.0    /100WBC


 


Absolute Seg Neuts  9.2 H    (1.4-5.7)  


 


Band Neutrophils #  0.7    


 


Lymphocytes # (Manual)  1.1    (0.6-2.4)  


 


Monocytes # (Manual)  0.6    (0.0-0.8)  


 


Eosinophils # (Manual)  0.4    (0.0-0.7)  


 


Nucleated RBCs #  0    K/uL


 


INR   1.27   


 


Sodium    140  (136-145)  mmol/L


 


Potassium    4.8  (3.5-5.1)  mmol/L


 


Chloride    107  ()  mmol/L


 


Carbon Dioxide    21.5  (21.0-32.0)  mmol/L


 


BUN    34 H  (7.0-18.0)  mg/dL


 


Creatinine    2.3 H  (0.6-1.0)  mg/dL


 


Est Cr Clr Drug Dosing    17.21  mL/min


 


Estimated GFR (MDRD)    20.6  ml/min


 


Glucose    125 H  ()  mg/dL


 


Calcium    9.7  (8.5-10.1)  mg/dL


 


Total Bilirubin    0.4  (0.2-1.0)  mg/dL


 


AST    23  (15-37)  IU/L


 


ALT    18  (14-63)  IU/L


 


Alkaline Phosphatase    48  ()  U/L


 


Troponin I    < 0.050  (0.000-0.056)  ng/mL


 


Total Protein    6.9  (6.4-8.2)  g/dL


 


Albumin    3.0 L  (3.4-5.0)  g/dL


 


Globulin    3.9  (2.6-4.0)  g/dL


 


Albumin/Globulin Ratio    0.8 L  (0.9-1.6)  


 


Urine Color     


 


Urine Appearance     


 


Urine pH     (5.0-8.0)  


 


Ur Specific Gravity     (1.001-1.035)  


 


Urine Protein     (NEGATIVE)  mg/dL


 


Urine Glucose (UA)     (NEGATIVE)  mg/dL


 


Urine Ketones     (NEGATIVE)  mg/dL


 


Urine Occult Blood     (NEGATIVE)  


 


Urine Nitrite     (NEGATIVE)  


 


Urine Bilirubin     (NEGATIVE)  


 


Urine Urobilinogen     (<2.0)  EU/dL


 


Ur Leukocyte Esterase     (NEGATIVE)  


 


Urine RBC     (0-2/HPF)  


 


Urine WBC     (0-5/HPF)  


 


Ur Epithelial Cells     (NONE-FEW)  


 


Urine Bacteria     (NEGATIVE)  


 


Urine Opiates Screen     (NEGATIVE)  


 


Ur Oxycodone Screen     (NEGATIVE)  


 


Urine Methadone Screen     (NEGATIVE)  


 


Ur Barbiturates Screen     (NEGATIVE)  


 


Ur Phencyclidine Scrn     (NEGATIVE)  


 


Ur Amphetamine Screen     (NEGATIVE)  


 


U Methamphetamines Scrn     (NEGATIVE)  


 


U Benzodiazepines Scrn     (NEGATIVE)  


 


U Cocaine Metab Screen     (NEGATIVE)  


 


U Marijuana (THC) Screen     (NEGATIVE)  














  04/16/19 04/16/19 Range/Units





  14:55 14:55 


 


WBC    (4.0-11.0)  K/uL


 


RBC    (4.30-5.90)  M/uL


 


Hgb    (12.0-16.0)  g/dL


 


Hct    (36.0-46.0)  %


 


MCV    (80.0-98.0)  fL


 


MCH    (27.0-32.0)  pg


 


MCHC    (31.0-37.0)  g/dL


 


RDW Std Deviation    (28.0-62.0)  fl


 


RDW Coeff of Charlee    (11.0-15.0)  %


 


Plt Count    (150-400)  K/uL


 


MPV    (7.40-12.00)  fL


 


Add Manual Diff    


 


Neutrophils % (Manual)    (48.0-80.0)  %


 


Band Neutrophils %    %


 


Lymphocytes % (Manual)    (16.0-40.0)  %


 


Monocytes % (Manual)    (0.0-15.0)  %


 


Eosinophils % (Manual)    (0.0-7.0)  %


 


Nucleated RBC %    /100WBC


 


Absolute Seg Neuts    (1.4-5.7)  


 


Band Neutrophils #    


 


Lymphocytes # (Manual)    (0.6-2.4)  


 


Monocytes # (Manual)    (0.0-0.8)  


 


Eosinophils # (Manual)    (0.0-0.7)  


 


Nucleated RBCs #    K/uL


 


INR    


 


Sodium    (136-145)  mmol/L


 


Potassium    (3.5-5.1)  mmol/L


 


Chloride    ()  mmol/L


 


Carbon Dioxide    (21.0-32.0)  mmol/L


 


BUN    (7.0-18.0)  mg/dL


 


Creatinine    (0.6-1.0)  mg/dL


 


Est Cr Clr Drug Dosing    mL/min


 


Estimated GFR (MDRD)    ml/min


 


Glucose    ()  mg/dL


 


Calcium    (8.5-10.1)  mg/dL


 


Total Bilirubin    (0.2-1.0)  mg/dL


 


AST    (15-37)  IU/L


 


ALT    (14-63)  IU/L


 


Alkaline Phosphatase    ()  U/L


 


Troponin I    (0.000-0.056)  ng/mL


 


Total Protein    (6.4-8.2)  g/dL


 


Albumin    (3.4-5.0)  g/dL


 


Globulin    (2.6-4.0)  g/dL


 


Albumin/Globulin Ratio    (0.9-1.6)  


 


Urine Color  YELLOW   


 


Urine Appearance  CLEAR   


 


Urine pH  7.0   (5.0-8.0)  


 


Ur Specific Gravity  1.010   (1.001-1.035)  


 


Urine Protein  NEGATIVE   (NEGATIVE)  mg/dL


 


Urine Glucose (UA)  NEGATIVE   (NEGATIVE)  mg/dL


 


Urine Ketones  NEGATIVE   (NEGATIVE)  mg/dL


 


Urine Occult Blood  SMALL H   (NEGATIVE)  


 


Urine Nitrite  NEGATIVE   (NEGATIVE)  


 


Urine Bilirubin  NEGATIVE   (NEGATIVE)  


 


Urine Urobilinogen  0.2   (<2.0)  EU/dL


 


Ur Leukocyte Esterase  SMALL H   (NEGATIVE)  


 


Urine RBC  1-2   (0-2/HPF)  


 


Urine WBC  1-2   (0-5/HPF)  


 


Ur Epithelial Cells  RARE   (NONE-FEW)  


 


Urine Bacteria  RARE   (NEGATIVE)  


 


Urine Opiates Screen   NEGATIVE  (NEGATIVE)  


 


Ur Oxycodone Screen   NEGATIVE  (NEGATIVE)  


 


Urine Methadone Screen   NEGATIVE  (NEGATIVE)  


 


Ur Barbiturates Screen   NEGATIVE  (NEGATIVE)  


 


Ur Phencyclidine Scrn   NEGATIVE  (NEGATIVE)  


 


Ur Amphetamine Screen   NEGATIVE  (NEGATIVE)  


 


U Methamphetamines Scrn   NEGATIVE  (NEGATIVE)  


 


U Benzodiazepines Scrn   NEGATIVE  (NEGATIVE)  


 


U Cocaine Metab Screen   NEGATIVE  (NEGATIVE)  


 


U Marijuana (THC) Screen   NEGATIVE  (NEGATIVE)  











Result Diagrams: 


 04/16/19 14:20





 04/16/19 14:20





- Problem List


(1) Syncope


SNOMED Code(s): 787942286


   ICD Code: R55 - SYNCOPE AND COLLAPSE   Status: Acute   Current Visit: Yes   


Qualifiers: 


   Encounter type: initial encounter 





(2) Head injury


SNOMED Code(s): 70839191


   ICD Code: S09.90XA - UNSPECIFIED INJURY OF HEAD, INITIAL ENCOUNTER   Status: 

Acute   Current Visit: Yes   


Qualifiers: 


   Encounter type: initial encounter   Qualified Code(s): S09.90XA - 

Unspecified injury of head, initial encounter   





(3) HTN (hypertension)


SNOMED Code(s): 15487973


   ICD Code: I10 - ESSENTIAL (PRIMARY) HYPERTENSION   Status: Chronic   Current 

Visit: Yes   


Qualifiers: 


   Hypertension type: essential hypertension   Qualified Code(s): I10 - 

Essential (primary) hypertension   





(4) Afib


SNOMED Code(s): 69590900


   ICD Code: I48.91 - UNSPECIFIED ATRIAL FIBRILLATION   Status: Chronic   

Current Visit: Yes   





(5) Chronic anticoagulation


SNOMED Code(s): 190766813


   ICD Code: Z79.01 - LONG TERM (CURRENT) USE OF ANTICOAGULANTS   Status: 

Chronic   Current Visit: Yes   





(6) Urinary retention


SNOMED Code(s): 934738875


   ICD Code: R33.9 - RETENTION OF URINE, UNSPECIFIED   Status: Chronic   

Current Visit: Yes   





(7) Hx of deep venous thrombosis


SNOMED Code(s): 051874239


   ICD Code: Z86.718 - PERSONAL HISTORY OF OTHER VENOUS THROMBOSIS AND EMBOLISM

   Status: Chronic   Current Visit: Yes   


Problem List Initiated/Reviewed/Updated: Yes


Orders Last 24hrs: 


 Active Orders 24 hr











 Category Date Time Status


 


 Patient Status [ADT] Stat ADT  04/16/19 15:14 Active


 


 Antiembolic Devices [RC] PER UNIT ROUTINE Care  04/16/19 15:59 Active


 


 Cardiac Monitoring [RC] .AS DIRECTED Care  04/16/19 13:38 Active


 


 Communication Order [RC] ROUTINE Care  04/16/19 16:26 Ordered


 


 EKG Documentation Completion [RC] STAT Care  04/16/19 13:38 Active


 


 Intake and Output [RC] QSHIFT Care  04/16/19 15:59 Active


 


 Orthostatic Vital Signs [RC] ASDIRECTED Care  04/16/19 16:00 Active


 


 Oxygen Therapy [RC] PRN Care  04/16/19 15:59 Active


 


 Oxygen Therapy, ED [RC] ASDIRECTED Care  04/16/19 13:38 Active


 


 Pulse Oximetry [RC] ASDIRECTED Care  04/16/19 13:38 Active


 


 Telemetry Monitoring [Cardiac Monitoring] [RC] .AS Care  04/16/19 15:57 Active





 DIRECTED   


 


 Up With Assistance [RC] ASDIRECTED Care  04/16/19 15:59 Active


 


 VTE/DVT Education [RC] PER UNIT ROUTINE Care  04/16/19 15:59 Active


 


 Vital Signs [RC] Q4H Care  04/16/19 15:59 Active


 


 Heart Healthy Diet [DIET] Diet  04/16/19 Dinner Active


 


 BASIC METABOLIC PANEL,BMP [CHEM] AM Lab  04/17/19 05:11 Ordered


 


 CBC WITH AUTO DIFF [HEME] AM Lab  04/17/19 05:11 Ordered


 


 CPK [CREATINE KINASE,CK] [CHEM] Routine Lab  04/16/19 16:24 Ordered


 


 CULTURE BLOOD [BC] Stat Lab  04/16/19 14:20 Received


 


 CULTURE BLOOD [BC] Stat Lab  04/16/19 14:28 Received


 


 CULTURE URINE [RM] Stat Lab  04/16/19 14:55 Received


 


 OCCULT BLOOD DIAGNOSTIC [OP] Routine Lab  04/16/19 16:23 Ordered


 


 Acetaminophen [Tylenol] Med  04/16/19 15:59 Active





 650 mg PO Q4H PRN   


 


 Ondansetron [Zofran] Med  04/16/19 15:59 Active





 4 mg IVPUSH Q4H PRN   


 


 Sodium Chloride 0.9% [Normal Saline] 1,000 ml Med  04/16/19 13:45 Active





 IV STAT   


 


 Sodium Chloride 0.9% [Saline Flush] Med  04/16/19 13:41 Active





 10 ml FLUSH ASDIRECTED PRN   


 


 Sodium Chloride 0.9% [Saline Flush] Med  04/16/19 13:41 Active





 2.5 ml FLUSH ASDIRECTED PRN   


 


 Blood Culture x2 Reflex Set [OM.PC] Stat Oth  04/16/19 13:40 Ordered


 


 Saline Lock Insert [OM.PC] Stat Oth  04/16/19 13:38 Ordered


 


 Sequential Compression Device [OM.PC] Per Unit Routine Oth  04/16/19 15:59 

Ordered








 Medication Orders





Acetaminophen (Tylenol)  650 mg PO Q4H PRN


   PRN Reason: Pain (mild 1-3)


Sodium Chloride (Normal Saline)  1,000 mls @ 125 mls/hr IV STAT BALDO


   Last Admin: 04/16/19 14:10  Dose: 125 mls/hr


Ondansetron HCl (Zofran)  4 mg IVPUSH Q4H PRN


   PRN Reason: Nausea


Sodium Chloride (Saline Flush)  10 ml FLUSH ASDIRECTED PRN


   PRN Reason: Keep Vein Open


   Last Admin: 04/16/19 14:10  Dose: 10 ml


Sodium Chloride (Saline Flush)  2.5 ml FLUSH ASDIRECTED PRN


   PRN Reason: Keep Vein Open


   Last Admin: 04/16/19 14:10  Dose: 2.5 ml








Assessment/Plan Comment:: 





THis 76 year old female admitted with syncope and fall at home.





1. Syncope: Obtain Orthostatic VS. Monitor on telemetry. Has history of afib. 

Anemia is noted, she is unclear of baseline hgb. Will obtain most recent 

labwork from [facility]. Is on chronic anticoagulation, obtain hemoccult. Iron 

studies. Neuro checks due to hitting head and on anticoagulation. CPK negative. 

PT to evaluate and treat





2. Afib: HR initially on arrival 127, now back in the 80s. Denies chest pain. 

Continue metoprolol. Monitor on Telemetry. 





3. HTN: Monitor, currently stable. 





4. AUGUSTINA: unclear if this is new or baseline, monitor in am and will again await 

recent labwork from PCP. 





5. Urinary retention: Continue self caths. 





VTE prophylaxis:Eliquis





Dispo:1-2 days








<Joseluis Mane - Last Filed: 04/17/19 08:04>





H&P History of Present Illness





- General


Admit Problem/Dx: 


 Admission Diagnosis/Problem





Admission Diagnosis/Problem      Traumatic injury





I have seen and examined the patient independently of Roxy Zarate CNP. I have 

discussed the case with her. I have reviewed and agreed with the plan of 

treatment as outlined for this patient by her. Please see orders. 





Exam





- Vital Signs


Vital Signs: 


 Last Vital Signs











Temp  36.0 C   04/17/19 07:44


 


Pulse  88   04/17/19 07:44


 


Resp  16   04/17/19 07:44


 


BP  119/50 L  04/17/19 07:44


 


Pulse Ox  95   04/17/19 07:44














- Patient Data


Lab Results Last 24 hrs: 


 Laboratory Results - last 24 hr











  04/16/19 04/16/19 04/16/19 Range/Units





  14:20 14:20 14:20 


 


WBC  11.97 H    (4.0-11.0)  K/uL


 


RBC  3.20 L    (4.30-5.90)  M/uL


 


Hgb  9.9 L    (12.0-16.0)  g/dL


 


Hct  30.6 L    (36.0-46.0)  %


 


MCV  95.6    (80.0-98.0)  fL


 


MCH  30.9    (27.0-32.0)  pg


 


MCHC  32.4    (31.0-37.0)  g/dL


 


RDW Std Deviation  52.0    (28.0-62.0)  fl


 


RDW Coeff of Charlee  15    (11.0-15.0)  %


 


Plt Count  427 H    (150-400)  K/uL


 


MPV  10.20    (7.40-12.00)  fL


 


Add Manual Diff  YES    


 


Neutrophils % (Manual)  77    (48.0-80.0)  %


 


Band Neutrophils %  6    %


 


Lymphocytes % (Manual)  9 L    (16.0-40.0)  %


 


Monocytes % (Manual)  5    (0.0-15.0)  %


 


Eosinophils % (Manual)  3    (0.0-7.0)  %


 


Basophils % (Manual)     (0.0-1.5)  %


 


Nucleated RBC %  0.0    /100WBC


 


Absolute Seg Neuts  9.2 H    (1.4-5.7)  


 


Band Neutrophils #  0.7    


 


Lymphocytes # (Manual)  1.1    (0.6-2.4)  


 


Monocytes # (Manual)  0.6    (0.0-0.8)  


 


Eosinophils # (Manual)  0.4    (0.0-0.7)  


 


Basophils # (Manual)     (0.0-0.1)  


 


Nucleated RBCs #  0    K/uL


 


Absolute Retic     (20-80)  K/uL


 


Percent Retic     (0.5-1.5)  %


 


Immature Retic Fraction     %


 


INR   1.27   


 


Sodium    140  (136-145)  mmol/L


 


Potassium    4.8  (3.5-5.1)  mmol/L


 


Chloride    107  ()  mmol/L


 


Carbon Dioxide    21.5  (21.0-32.0)  mmol/L


 


BUN    34 H  (7.0-18.0)  mg/dL


 


Creatinine    2.3 H  (0.6-1.0)  mg/dL


 


Est Cr Clr Drug Dosing    17.21  mL/min


 


Estimated GFR (MDRD)    20.6  ml/min


 


Glucose    125 H  ()  mg/dL


 


Calcium    9.7  (8.5-10.1)  mg/dL


 


Iron     ()  ug/dL


 


TIBC     (250-450)  ug/dL


 


% Saturation     (20-55)  %


 


Transferrin     (200-400)  ug/dL


 


Ferritin     (8-252)  ng/mL


 


Total Bilirubin    0.4  (0.2-1.0)  mg/dL


 


AST    23  (15-37)  IU/L


 


ALT    18  (14-63)  IU/L


 


Alkaline Phosphatase    48  ()  U/L


 


Creatine Kinase     ()  U/L


 


Troponin I    < 0.050  (0.000-0.056)  ng/mL


 


Total Protein    6.9  (6.4-8.2)  g/dL


 


Albumin    3.0 L  (3.4-5.0)  g/dL


 


Globulin    3.9  (2.6-4.0)  g/dL


 


Albumin/Globulin Ratio    0.8 L  (0.9-1.6)  


 


Vitamin B12     (193-986)  pg/mL


 


Folate     (8.60-58.90)  ng/mL


 


Urine Color     


 


Urine Appearance     


 


Urine pH     (5.0-8.0)  


 


Ur Specific Gravity     (1.001-1.035)  


 


Urine Protein     (NEGATIVE)  mg/dL


 


Urine Glucose (UA)     (NEGATIVE)  mg/dL


 


Urine Ketones     (NEGATIVE)  mg/dL


 


Urine Occult Blood     (NEGATIVE)  


 


Urine Nitrite     (NEGATIVE)  


 


Urine Bilirubin     (NEGATIVE)  


 


Urine Urobilinogen     (<2.0)  EU/dL


 


Ur Leukocyte Esterase     (NEGATIVE)  


 


Urine RBC     (0-2/HPF)  


 


Urine WBC     (0-5/HPF)  


 


Ur Epithelial Cells     (NONE-FEW)  


 


Urine Bacteria     (NEGATIVE)  


 


Urine Opiates Screen     (NEGATIVE)  


 


Ur Oxycodone Screen     (NEGATIVE)  


 


Urine Methadone Screen     (NEGATIVE)  


 


Ur Barbiturates Screen     (NEGATIVE)  


 


Ur Phencyclidine Scrn     (NEGATIVE)  


 


Ur Amphetamine Screen     (NEGATIVE)  


 


U Methamphetamines Scrn     (NEGATIVE)  


 


U Benzodiazepines Scrn     (NEGATIVE)  


 


U Cocaine Metab Screen     (NEGATIVE)  


 


U Marijuana (THC) Screen     (NEGATIVE)  














  04/16/19 04/16/19 04/16/19 Range/Units





  14:20 14:20 14:20 


 


WBC     (4.0-11.0)  K/uL


 


RBC    3.01 L  (4.30-5.90)  M/uL


 


Hgb     (12.0-16.0)  g/dL


 


Hct     (36.0-46.0)  %


 


MCV     (80.0-98.0)  fL


 


MCH     (27.0-32.0)  pg


 


MCHC     (31.0-37.0)  g/dL


 


RDW Std Deviation     (28.0-62.0)  fl


 


RDW Coeff of Charlee     (11.0-15.0)  %


 


Plt Count     (150-400)  K/uL


 


MPV     (7.40-12.00)  fL


 


Add Manual Diff     


 


Neutrophils % (Manual)     (48.0-80.0)  %


 


Band Neutrophils %     %


 


Lymphocytes % (Manual)     (16.0-40.0)  %


 


Monocytes % (Manual)     (0.0-15.0)  %


 


Eosinophils % (Manual)     (0.0-7.0)  %


 


Basophils % (Manual)     (0.0-1.5)  %


 


Nucleated RBC %     /100WBC


 


Absolute Seg Neuts     (1.4-5.7)  


 


Band Neutrophils #     


 


Lymphocytes # (Manual)     (0.6-2.4)  


 


Monocytes # (Manual)     (0.0-0.8)  


 


Eosinophils # (Manual)     (0.0-0.7)  


 


Basophils # (Manual)     (0.0-0.1)  


 


Nucleated RBCs #     K/uL


 


Absolute Retic    57.20  (20-80)  K/uL


 


Percent Retic    1.9 H  (0.5-1.5)  %


 


Immature Retic Fraction    18  %


 


INR     


 


Sodium     (136-145)  mmol/L


 


Potassium     (3.5-5.1)  mmol/L


 


Chloride     ()  mmol/L


 


Carbon Dioxide     (21.0-32.0)  mmol/L


 


BUN     (7.0-18.0)  mg/dL


 


Creatinine     (0.6-1.0)  mg/dL


 


Est Cr Clr Drug Dosing     mL/min


 


Estimated GFR (MDRD)     ml/min


 


Glucose     ()  mg/dL


 


Calcium     (8.5-10.1)  mg/dL


 


Iron   47 L   ()  ug/dL


 


TIBC   340   (250-450)  ug/dL


 


% Saturation   13.82 L   (20-55)  %


 


Transferrin   238   (200-400)  ug/dL


 


Ferritin   169   (8-252)  ng/mL


 


Total Bilirubin     (0.2-1.0)  mg/dL


 


AST     (15-37)  IU/L


 


ALT     (14-63)  IU/L


 


Alkaline Phosphatase     ()  U/L


 


Creatine Kinase  172    ()  U/L


 


Troponin I     (0.000-0.056)  ng/mL


 


Total Protein     (6.4-8.2)  g/dL


 


Albumin     (3.4-5.0)  g/dL


 


Globulin     (2.6-4.0)  g/dL


 


Albumin/Globulin Ratio     (0.9-1.6)  


 


Vitamin B12     (193-986)  pg/mL


 


Folate     (8.60-58.90)  ng/mL


 


Urine Color     


 


Urine Appearance     


 


Urine pH     (5.0-8.0)  


 


Ur Specific Gravity     (1.001-1.035)  


 


Urine Protein     (NEGATIVE)  mg/dL


 


Urine Glucose (UA)     (NEGATIVE)  mg/dL


 


Urine Ketones     (NEGATIVE)  mg/dL


 


Urine Occult Blood     (NEGATIVE)  


 


Urine Nitrite     (NEGATIVE)  


 


Urine Bilirubin     (NEGATIVE)  


 


Urine Urobilinogen     (<2.0)  EU/dL


 


Ur Leukocyte Esterase     (NEGATIVE)  


 


Urine RBC     (0-2/HPF)  


 


Urine WBC     (0-5/HPF)  


 


Ur Epithelial Cells     (NONE-FEW)  


 


Urine Bacteria     (NEGATIVE)  


 


Urine Opiates Screen     (NEGATIVE)  


 


Ur Oxycodone Screen     (NEGATIVE)  


 


Urine Methadone Screen     (NEGATIVE)  


 


Ur Barbiturates Screen     (NEGATIVE)  


 


Ur Phencyclidine Scrn     (NEGATIVE)  


 


Ur Amphetamine Screen     (NEGATIVE)  


 


U Methamphetamines Scrn     (NEGATIVE)  


 


U Benzodiazepines Scrn     (NEGATIVE)  


 


U Cocaine Metab Screen     (NEGATIVE)  


 


U Marijuana (THC) Screen     (NEGATIVE)  














  04/16/19 04/16/19 04/16/19 Range/Units





  14:20 14:55 14:55 


 


WBC     (4.0-11.0)  K/uL


 


RBC     (4.30-5.90)  M/uL


 


Hgb     (12.0-16.0)  g/dL


 


Hct     (36.0-46.0)  %


 


MCV     (80.0-98.0)  fL


 


MCH     (27.0-32.0)  pg


 


MCHC     (31.0-37.0)  g/dL


 


RDW Std Deviation     (28.0-62.0)  fl


 


RDW Coeff of Charlee     (11.0-15.0)  %


 


Plt Count     (150-400)  K/uL


 


MPV     (7.40-12.00)  fL


 


Add Manual Diff     


 


Neutrophils % (Manual)     (48.0-80.0)  %


 


Band Neutrophils %     %


 


Lymphocytes % (Manual)     (16.0-40.0)  %


 


Monocytes % (Manual)     (0.0-15.0)  %


 


Eosinophils % (Manual)     (0.0-7.0)  %


 


Basophils % (Manual)     (0.0-1.5)  %


 


Nucleated RBC %     /100WBC


 


Absolute Seg Neuts     (1.4-5.7)  


 


Band Neutrophils #     


 


Lymphocytes # (Manual)     (0.6-2.4)  


 


Monocytes # (Manual)     (0.0-0.8)  


 


Eosinophils # (Manual)     (0.0-0.7)  


 


Basophils # (Manual)     (0.0-0.1)  


 


Nucleated RBCs #     K/uL


 


Absolute Retic     (20-80)  K/uL


 


Percent Retic     (0.5-1.5)  %


 


Immature Retic Fraction     %


 


INR     


 


Sodium     (136-145)  mmol/L


 


Potassium     (3.5-5.1)  mmol/L


 


Chloride     ()  mmol/L


 


Carbon Dioxide     (21.0-32.0)  mmol/L


 


BUN     (7.0-18.0)  mg/dL


 


Creatinine     (0.6-1.0)  mg/dL


 


Est Cr Clr Drug Dosing     mL/min


 


Estimated GFR (MDRD)     ml/min


 


Glucose     ()  mg/dL


 


Calcium     (8.5-10.1)  mg/dL


 


Iron     ()  ug/dL


 


TIBC     (250-450)  ug/dL


 


% Saturation     (20-55)  %


 


Transferrin     (200-400)  ug/dL


 


Ferritin     (8-252)  ng/mL


 


Total Bilirubin     (0.2-1.0)  mg/dL


 


AST     (15-37)  IU/L


 


ALT     (14-63)  IU/L


 


Alkaline Phosphatase     ()  U/L


 


Creatine Kinase     ()  U/L


 


Troponin I     (0.000-0.056)  ng/mL


 


Total Protein     (6.4-8.2)  g/dL


 


Albumin     (3.4-5.0)  g/dL


 


Globulin     (2.6-4.0)  g/dL


 


Albumin/Globulin Ratio     (0.9-1.6)  


 


Vitamin B12  840    (193-986)  pg/mL


 


Folate  15.30    (8.60-58.90)  ng/mL


 


Urine Color   YELLOW   


 


Urine Appearance   CLEAR   


 


Urine pH   7.0   (5.0-8.0)  


 


Ur Specific Gravity   1.010   (1.001-1.035)  


 


Urine Protein   NEGATIVE   (NEGATIVE)  mg/dL


 


Urine Glucose (UA)   NEGATIVE   (NEGATIVE)  mg/dL


 


Urine Ketones   NEGATIVE   (NEGATIVE)  mg/dL


 


Urine Occult Blood   SMALL H   (NEGATIVE)  


 


Urine Nitrite   NEGATIVE   (NEGATIVE)  


 


Urine Bilirubin   NEGATIVE   (NEGATIVE)  


 


Urine Urobilinogen   0.2   (<2.0)  EU/dL


 


Ur Leukocyte Esterase   SMALL H   (NEGATIVE)  


 


Urine RBC   1-2   (0-2/HPF)  


 


Urine WBC   1-2   (0-5/HPF)  


 


Ur Epithelial Cells   RARE   (NONE-FEW)  


 


Urine Bacteria   RARE   (NEGATIVE)  


 


Urine Opiates Screen    NEGATIVE  (NEGATIVE)  


 


Ur Oxycodone Screen    NEGATIVE  (NEGATIVE)  


 


Urine Methadone Screen    NEGATIVE  (NEGATIVE)  


 


Ur Barbiturates Screen    NEGATIVE  (NEGATIVE)  


 


Ur Phencyclidine Scrn    NEGATIVE  (NEGATIVE)  


 


Ur Amphetamine Screen    NEGATIVE  (NEGATIVE)  


 


U Methamphetamines Scrn    NEGATIVE  (NEGATIVE)  


 


U Benzodiazepines Scrn    NEGATIVE  (NEGATIVE)  


 


U Cocaine Metab Screen    NEGATIVE  (NEGATIVE)  


 


U Marijuana (THC) Screen    NEGATIVE  (NEGATIVE)  














  04/17/19 04/17/19 Range/Units





  04:50 04:50 


 


WBC  7.91   (4.0-11.0)  K/uL


 


RBC  2.57 L   (4.30-5.90)  M/uL


 


Hgb  8.1 L   (12.0-16.0)  g/dL


 


Hct  24.9 L   (36.0-46.0)  %


 


MCV  96.9   (80.0-98.0)  fL


 


MCH  31.5   (27.0-32.0)  pg


 


MCHC  32.5   (31.0-37.0)  g/dL


 


RDW Std Deviation  46.3   (28.0-62.0)  fl


 


RDW Coeff of Charlee  14   (11.0-15.0)  %


 


Plt Count  342   (150-400)  K/uL


 


MPV  10.60   (7.40-12.00)  fL


 


Add Manual Diff  YES   


 


Neutrophils % (Manual)  62   (48.0-80.0)  %


 


Band Neutrophils %  5   %


 


Lymphocytes % (Manual)  21   (16.0-40.0)  %


 


Monocytes % (Manual)  4   (0.0-15.0)  %


 


Eosinophils % (Manual)  7   (0.0-7.0)  %


 


Basophils % (Manual)  1   (0.0-1.5)  %


 


Nucleated RBC %    /100WBC


 


Absolute Seg Neuts  4.9   (1.4-5.7)  


 


Band Neutrophils #  0.4   


 


Lymphocytes # (Manual)  1.7   (0.6-2.4)  


 


Monocytes # (Manual)  0.3   (0.0-0.8)  


 


Eosinophils # (Manual)  0.6   (0.0-0.7)  


 


Basophils # (Manual)  0.1   (0.0-0.1)  


 


Nucleated RBCs #    K/uL


 


Absolute Retic    (20-80)  K/uL


 


Percent Retic    (0.5-1.5)  %


 


Immature Retic Fraction    %


 


INR    


 


Sodium   140  (136-145)  mmol/L


 


Potassium   4.5  (3.5-5.1)  mmol/L


 


Chloride   109 H  ()  mmol/L


 


Carbon Dioxide   20.4 L  (21.0-32.0)  mmol/L


 


BUN   25 H  (7.0-18.0)  mg/dL


 


Creatinine   1.9 H  (0.6-1.0)  mg/dL


 


Est Cr Clr Drug Dosing   19.01  mL/min


 


Estimated GFR (MDRD)   25.7  ml/min


 


Glucose   99  ()  mg/dL


 


Calcium   8.4 L  (8.5-10.1)  mg/dL


 


Iron    ()  ug/dL


 


TIBC    (250-450)  ug/dL


 


% Saturation    (20-55)  %


 


Transferrin    (200-400)  ug/dL


 


Ferritin    (8-252)  ng/mL


 


Total Bilirubin    (0.2-1.0)  mg/dL


 


AST    (15-37)  IU/L


 


ALT    (14-63)  IU/L


 


Alkaline Phosphatase    ()  U/L


 


Creatine Kinase    ()  U/L


 


Troponin I    (0.000-0.056)  ng/mL


 


Total Protein    (6.4-8.2)  g/dL


 


Albumin    (3.4-5.0)  g/dL


 


Globulin    (2.6-4.0)  g/dL


 


Albumin/Globulin Ratio    (0.9-1.6)  


 


Vitamin B12    (193-986)  pg/mL


 


Folate    (8.60-58.90)  ng/mL


 


Urine Color    


 


Urine Appearance    


 


Urine pH    (5.0-8.0)  


 


Ur Specific Gravity    (1.001-1.035)  


 


Urine Protein    (NEGATIVE)  mg/dL


 


Urine Glucose (UA)    (NEGATIVE)  mg/dL


 


Urine Ketones    (NEGATIVE)  mg/dL


 


Urine Occult Blood    (NEGATIVE)  


 


Urine Nitrite    (NEGATIVE)  


 


Urine Bilirubin    (NEGATIVE)  


 


Urine Urobilinogen    (<2.0)  EU/dL


 


Ur Leukocyte Esterase    (NEGATIVE)  


 


Urine RBC    (0-2/HPF)  


 


Urine WBC    (0-5/HPF)  


 


Ur Epithelial Cells    (NONE-FEW)  


 


Urine Bacteria    (NEGATIVE)  


 


Urine Opiates Screen    (NEGATIVE)  


 


Ur Oxycodone Screen    (NEGATIVE)  


 


Urine Methadone Screen    (NEGATIVE)  


 


Ur Barbiturates Screen    (NEGATIVE)  


 


Ur Phencyclidine Scrn    (NEGATIVE)  


 


Ur Amphetamine Screen    (NEGATIVE)  


 


U Methamphetamines Scrn    (NEGATIVE)  


 


U Benzodiazepines Scrn    (NEGATIVE)  


 


U Cocaine Metab Screen    (NEGATIVE)  


 


U Marijuana (THC) Screen    (NEGATIVE)  











Result Diagrams: 


 04/17/19 04:50





 04/17/19 04:50


Orders Last 24hrs: 


 Active Orders 24 hr











 Category Date Time Status


 


 Patient Status [ADT] Stat ADT  04/16/19 15:14 Active


 


 Antiembolic Devices [RC] PER UNIT ROUTINE Care  04/16/19 15:59 Active


 


 Cardiac Monitoring [RC] .AS DIRECTED Care  04/16/19 13:38 Active


 


 Communication Order [RC] ROUTINE Care  04/16/19 16:26 Active


 


 EKG Documentation Completion [RC] STAT Care  04/16/19 13:38 Active


 


 Neurological Monitoring [RC] Q2H Care  04/16/19 16:44 Active


 


 Orthostatic Vital Signs [RC] ASDIRECTED Care  04/16/19 16:00 Active


 


 Oxygen Therapy [RC] PRN Care  04/16/19 15:59 Active


 


 Oxygen Therapy, ED [RC] ASDIRECTED Care  04/16/19 13:38 Active


 


 Pulse Oximetry [RC] ASDIRECTED Care  04/16/19 13:38 Active


 


 Telemetry Monitoring [Cardiac Monitoring] [RC] Q8H Care  04/16/19 15:57 Active


 


 Up With Assistance [RC] ASDIRECTED Care  04/16/19 15:59 Active


 


 Urinary Catheter Assessment [RC] ASDIRECTED Care  04/16/19 19:04 Active


 


 Urinary Catheter Insertion [Insert Urinary Catheter] [ Care  04/16/19 19:15 

Ordered





 OM.PC] Q24H   


 


 VTE/DVT Education [RC] PER UNIT ROUTINE Care  04/16/19 15:59 Active


 


 Vital Signs [RC] Q4H Care  04/16/19 15:59 Active


 


 Consult to Physical Therapy [PT Evaluation and Cons  04/16/19 18:12 Active





 Treatment] [CONS] Routine   


 


 Heart Healthy Diet [DIET] Diet  04/16/19 Dinner Active


 


 CULTURE BLOOD [BC] Stat Lab  04/16/19 14:20 Received


 


 CULTURE BLOOD [BC] Stat Lab  04/16/19 14:28 Received


 


 CULTURE URINE [RM] Stat Lab  04/16/19 14:55 Received


 


 OCCULT BLOOD DIAGNOSTIC [OP] Routine Lab  04/16/19 16:23 Ordered


 


 Acetaminophen [Tylenol] Med  04/16/19 15:59 Active





 650 mg PO Q4H PRN   


 


 Apixaban [Eliquis] Med  04/16/19 21:00 Active





 10 mg PO BID   


 


 Aspirin Med  04/17/19 09:00 Active





 81 mg PO DAILY   


 


 Fluticasone Propionate [Flonase] Med  04/17/19 09:00 Active





 See Dose Instructions  NASBOTH DAILY   


 


 Metoprolol Succinate [Toprol XL] Med  04/17/19 09:00 Active





 25 mg PO DAILY   


 


 Ondansetron [Zofran] Med  04/16/19 15:59 Active





 4 mg IVPUSH Q4H PRN   


 


 Sodium Chloride 0.9% [Normal Saline] 1,000 ml Med  04/16/19 18:15 Active





 IV ASDIRECTED   


 


 Sodium Chloride 0.9% [Saline Flush] Med  04/16/19 13:41 Active





 10 ml FLUSH ASDIRECTED PRN   


 


 Sodium Chloride 0.9% [Saline Flush] Med  04/16/19 13:41 Active





 2.5 ml FLUSH ASDIRECTED PRN   


 


 Zaleplon [Sonata] Med  04/16/19 21:00 Active





 5 mg PO BEDTIME   


 


 Blood Culture x2 Reflex Set [OM.PC] Stat Oth  04/16/19 13:40 Ordered


 


 Saline Lock Insert [OM.PC] Stat Oth  04/16/19 13:38 Ordered


 


 Sequential Compression Device [OM.PC] Per Unit Routine Oth  04/16/19 15:59 

Ordered


 


 Resuscitation Status Routine Resus Stat  04/16/19 16:44 Ordered








 Medication Orders





Acetaminophen (Tylenol)  650 mg PO Q4H PRN


   PRN Reason: Pain (mild 1-3)


Apixaban (Eliquis)  10 mg PO BID BALDO


   Last Admin: 04/16/19 21:49  Dose: 10 mg


Aspirin (Aspirin)  81 mg PO DAILY BALDO


Fluticasone Propionate (Flonase)  0 gm NASBOTH DAILY BALDO


Sodium Chloride (Normal Saline)  1,000 mls @ 100 mls/hr IV ASDIRECTED BALDO


   Last Admin: 04/17/19 06:45  Dose: 100 mls/hr


   Infusion: 04/17/19 06:45  Dose: 100 mls/hr


   Admin: 04/16/19 21:50  Dose: 100 mls/hr


Metoprolol Succinate (Toprol Xl)  25 mg PO DAILY [facility]


Ondansetron HCl (Zofran)  4 mg IVPUSH Q4H PRN


   PRN Reason: Nausea


Sodium Chloride (Saline Flush)  10 ml FLUSH ASDIRECTED PRN


   PRN Reason: Keep Vein Open


   Last Admin: 04/16/19 14:10  Dose: 10 ml


Sodium Chloride (Saline Flush)  2.5 ml FLUSH ASDIRECTED PRN


   PRN Reason: Keep Vein Open


   Last Admin: 04/16/19 14:10  Dose: 2.5 ml


Zaleplon (Sonata)  5 mg PO BEDTIME [facility]


   Last Admin: 04/16/19 21:49  Dose: 5 mg
2 = assistive person

## 2019-05-02 NOTE — INPATIENT CERTIFICATION FOR MEDICARE PATIENTS - PHYSICIAN CONCUR
I concur with the Admission Order and I certify that services are provided in accordance with Section 42 CFR § 412.3 Admit for IOL  Oxytocin for induction after reactive FHT  Epidural PRN  D/W Dr. Morrison

## 2019-07-09 NOTE — ED ADULT NURSE NOTE - NS ED NOTE  TALK SOMEONE YN
Patient Education       External Ear Infection (Child)  Your child has an infection in the ear canal. This problem is also known as external otitis, otitis externa, or “swimmer’s ear.” It is usually caused by bacteria or fungus. It can occur if water is trapped in the ear canal (from swimming or bathing). Putting cotton swabs or other objects in the ear can also damage the skin in the ear canal and make this problem more likely.  Your child may have pain, itching, redness, drainage, or swelling of the ear canal. He or she may also have temporary hearing loss. In most cases, symptoms resolve within a week.  Home care  Follow these guidelines when caring for your child at home:  · Don’t try to clean the ear canal. This may push pus and bacteria deeper into the canal.  · Use prescribed eardrops as directed. These help reduce swelling and fight the infection. If an ear wick was placed in the ear canal, apply drops right onto the end of the wick. The wick will draw the medicine into the ear canal even if it is swollen closed.  · A cotton ball may be loosely placed in the outer ear to absorb any drainage.  · Don’t allow water to get into your child’s ear when he or she bathing. Also, don’t allow your child to go swimming for at least 7 to10 days after starting treatment.  · You may give your child acetaminophen to control pain, unless another pain medicine was prescribed. In children older than 6 months, you may use ibuprofen instead of acetaminophen. If your child has chronic liver or kidney disease, talk with the provider before using these medicines. Also talk with the provider if your child has had a stomach ulcer or gastrointestinal bleeding. Don’t give aspirin to a child younger than 18 years old who is ill with a fever. It may cause severe liver damage.  Prevention  · Don’t clean the inside of your child’s ears. Also, caution your child not to stick objects inside his or her ears.  · Have your child wear earplugs  when swimming.  · After exiting water, have your child turn his or her head to the side to drain any excess water from the ears. Ears should be dried well with a towel. A hair dryer may be used to dry the ears, but it needs to be on a low or cool setting and about 12 inches away from the ears.  · If your child feels water trapped in the ears, use ear drops right away. You can get these drops over the counter at most drugstores. They work by removing water from the ear canal.  Follow-up care  Follow up with your child’s healthcare provider, or as directed.  When to seek medical advice  Call your child's provider right away if any of these occur:  · Fever (see Fever and children, below)  · Symptoms worsen or do not get better after 3 days of treatment  · New symptoms appear  · Outer ear becomes red, warm, or swollen     Fever and children  Always use a digital thermometer to check your child’s temperature. Never use a mercury thermometer.  For infants and toddlers, be sure to use a rectal thermometer correctly. A rectal thermometer may accidentally poke a hole in (perforate) the rectum. It may also pass on germs from the stool. Always follow the product maker’s directions for proper use. If you don’t feel comfortable taking a rectal temperature, use another method. When you talk to your child’s healthcare provider, tell him or her which method you used to take your child’s temperature.  Here are guidelines for fever temperature. Ear temperatures aren’t accurate before 6 months of age. Don’t take an oral temperature until your child is at least 4 years old.  Infant under 3 months old:  · Ask your child’s healthcare provider how you should take the temperature.  · Rectal or forehead (temporal artery) temperature of 100.4°F (38°C) or higher, or as directed by the provider  · Armpit temperature of 99°F (37.2°C) or higher, or as directed by the provider  Child age 3 to 36 months:  · Rectal, forehead (temporal artery), or  ear temperature of 102°F (38.9°C) or higher, or as directed by the provider  · Armpit temperature of 101°F (38.3°C) or higher, or as directed by the provider  Child of any age:  · Repeated temperature of 104°F (40°C) or higher, or as directed by the provider  · Fever that lasts more than 24 hours in a child under 2 years old. Or a fever that lasts for 3 days in a child 2 years or older.      Date Last Reviewed: 6/2/2017 © 2000-2018 [employer]. [address] [place] [address]. All rights reserved. This information is not intended as a substitute for professional medical care. Always follow your healthcare professional's instructions.            No

## 2020-02-28 NOTE — PATIENT PROFILE ADULT - NSPROGENARRIVEDFROM_GEN_A_NUR
Mammogram dated 7/23/19, pap dated 5/23/19 and colonoscopy 3/20/2017 performed at [place] sent to [place] for media upload.     home

## 2020-09-03 NOTE — DISCHARGE NOTE NURSING/CASE MANAGEMENT/SOCIAL WORK - NSDCDPATPORTLINK_GEN_ALL_CORE
Point of Care Ultrasound Cardiac
You can access the [facility] Patient Portal, offered by [facility], by registering with the following website: [email or web address]

## 2020-10-27 ENCOUNTER — INPATIENT (INPATIENT)
Facility: HOSPITAL | Age: 85
LOS: 6 days | Discharge: HOME HEALTH | DRG: 291 | End: 2020-11-03
Attending: INTERNAL MEDICINE | Admitting: HOSPITALIST
Payer: MEDICARE

## 2020-10-27 VITALS — WEIGHT: 115.08 LBS | HEIGHT: 72 IN

## 2020-10-27 DIAGNOSIS — J90 PLEURAL EFFUSION, NOT ELSEWHERE CLASSIFIED: ICD-10-CM

## 2020-10-27 LAB
ALBUMIN SERPL ELPH-MCNC: 3 G/DL — LOW (ref 3.3–5)
ALP SERPL-CCNC: 62 U/L — SIGNIFICANT CHANGE UP (ref 40–120)
ALT FLD-CCNC: 19 U/L — SIGNIFICANT CHANGE UP (ref 12–78)
ANION GAP SERPL CALC-SCNC: 8 MMOL/L — SIGNIFICANT CHANGE UP (ref 5–17)
APTT BLD: 40.6 SEC — HIGH (ref 27.5–35.5)
AST SERPL-CCNC: 30 U/L — SIGNIFICANT CHANGE UP (ref 15–37)
BASOPHILS # BLD AUTO: 0.02 K/UL — SIGNIFICANT CHANGE UP (ref 0–0.2)
BASOPHILS NFR BLD AUTO: 0.2 % — SIGNIFICANT CHANGE UP (ref 0–2)
BILIRUB SERPL-MCNC: 1.1 MG/DL — SIGNIFICANT CHANGE UP (ref 0.2–1.2)
BUN SERPL-MCNC: 18 MG/DL — SIGNIFICANT CHANGE UP (ref 7–23)
CALCIUM SERPL-MCNC: 8.9 MG/DL — SIGNIFICANT CHANGE UP (ref 8.5–10.1)
CHLORIDE SERPL-SCNC: 96 MMOL/L — SIGNIFICANT CHANGE UP (ref 96–108)
CO2 SERPL-SCNC: 25 MMOL/L — SIGNIFICANT CHANGE UP (ref 22–31)
CREAT SERPL-MCNC: 0.82 MG/DL — SIGNIFICANT CHANGE UP (ref 0.5–1.3)
EOSINOPHIL # BLD AUTO: 0.05 K/UL — SIGNIFICANT CHANGE UP (ref 0–0.5)
EOSINOPHIL NFR BLD AUTO: 0.6 % — SIGNIFICANT CHANGE UP (ref 0–6)
GLUCOSE SERPL-MCNC: 116 MG/DL — HIGH (ref 70–99)
HCT VFR BLD CALC: 40.4 % — SIGNIFICANT CHANGE UP (ref 34.5–45)
HGB BLD-MCNC: 13.4 G/DL — SIGNIFICANT CHANGE UP (ref 11.5–15.5)
IMM GRANULOCYTES NFR BLD AUTO: 0.7 % — SIGNIFICANT CHANGE UP (ref 0–1.5)
INR BLD: 4.97 RATIO — HIGH (ref 0.88–1.16)
LYMPHOCYTES # BLD AUTO: 0.32 K/UL — LOW (ref 1–3.3)
LYMPHOCYTES # BLD AUTO: 3.8 % — LOW (ref 13–44)
MAGNESIUM SERPL-MCNC: 2 MG/DL — SIGNIFICANT CHANGE UP (ref 1.6–2.6)
MCHC RBC-ENTMCNC: 30 PG — SIGNIFICANT CHANGE UP (ref 27–34)
MCHC RBC-ENTMCNC: 33.2 GM/DL — SIGNIFICANT CHANGE UP (ref 32–36)
MCV RBC AUTO: 90.6 FL — SIGNIFICANT CHANGE UP (ref 80–100)
MONOCYTES # BLD AUTO: 0.53 K/UL — SIGNIFICANT CHANGE UP (ref 0–0.9)
MONOCYTES NFR BLD AUTO: 6.4 % — SIGNIFICANT CHANGE UP (ref 2–14)
NEUTROPHILS # BLD AUTO: 7.36 K/UL — SIGNIFICANT CHANGE UP (ref 1.8–7.4)
NEUTROPHILS NFR BLD AUTO: 88.3 % — HIGH (ref 43–77)
NT-PROBNP SERPL-SCNC: 9443 PG/ML — HIGH (ref 0–450)
PLATELET # BLD AUTO: 167 K/UL — SIGNIFICANT CHANGE UP (ref 150–400)
POTASSIUM SERPL-MCNC: 4.2 MMOL/L — SIGNIFICANT CHANGE UP (ref 3.5–5.3)
POTASSIUM SERPL-SCNC: 4.2 MMOL/L — SIGNIFICANT CHANGE UP (ref 3.5–5.3)
PROT SERPL-MCNC: 7.2 GM/DL — SIGNIFICANT CHANGE UP (ref 6–8.3)
PROTHROM AB SERPL-ACNC: 53.4 SEC — HIGH (ref 10.6–13.6)
RBC # BLD: 4.46 M/UL — SIGNIFICANT CHANGE UP (ref 3.8–5.2)
RBC # FLD: 12.8 % — SIGNIFICANT CHANGE UP (ref 10.3–14.5)
SARS-COV-2 RNA SPEC QL NAA+PROBE: SIGNIFICANT CHANGE UP
SODIUM SERPL-SCNC: 129 MMOL/L — LOW (ref 135–145)
WBC # BLD: 8.34 K/UL — SIGNIFICANT CHANGE UP (ref 3.8–10.5)
WBC # FLD AUTO: 8.34 K/UL — SIGNIFICANT CHANGE UP (ref 3.8–10.5)

## 2020-10-27 PROCEDURE — 97116 GAIT TRAINING THERAPY: CPT | Mod: GP

## 2020-10-27 PROCEDURE — 97530 THERAPEUTIC ACTIVITIES: CPT | Mod: GP

## 2020-10-27 PROCEDURE — 93306 TTE W/DOPPLER COMPLETE: CPT

## 2020-10-27 PROCEDURE — 36415 COLL VENOUS BLD VENIPUNCTURE: CPT

## 2020-10-27 PROCEDURE — 84484 ASSAY OF TROPONIN QUANT: CPT

## 2020-10-27 PROCEDURE — 93005 ELECTROCARDIOGRAM TRACING: CPT

## 2020-10-27 PROCEDURE — 80048 BASIC METABOLIC PNL TOTAL CA: CPT

## 2020-10-27 PROCEDURE — 80053 COMPREHEN METABOLIC PANEL: CPT

## 2020-10-27 PROCEDURE — 94660 CPAP INITIATION&MGMT: CPT

## 2020-10-27 PROCEDURE — 84443 ASSAY THYROID STIM HORMONE: CPT

## 2020-10-27 PROCEDURE — 71045 X-RAY EXAM CHEST 1 VIEW: CPT | Mod: 26

## 2020-10-27 PROCEDURE — 84439 ASSAY OF FREE THYROXINE: CPT

## 2020-10-27 PROCEDURE — 71250 CT THORAX DX C-: CPT | Mod: 26

## 2020-10-27 PROCEDURE — 85027 COMPLETE CBC AUTOMATED: CPT

## 2020-10-27 PROCEDURE — 71045 X-RAY EXAM CHEST 1 VIEW: CPT

## 2020-10-27 PROCEDURE — 86769 SARS-COV-2 COVID-19 ANTIBODY: CPT

## 2020-10-27 PROCEDURE — 85610 PROTHROMBIN TIME: CPT

## 2020-10-27 PROCEDURE — 82962 GLUCOSE BLOOD TEST: CPT

## 2020-10-27 PROCEDURE — 99223 1ST HOSP IP/OBS HIGH 75: CPT | Mod: AI

## 2020-10-27 PROCEDURE — 97162 PT EVAL MOD COMPLEX 30 MIN: CPT | Mod: GP

## 2020-10-27 PROCEDURE — 85730 THROMBOPLASTIN TIME PARTIAL: CPT

## 2020-10-27 RX ORDER — WARFARIN SODIUM 2.5 MG/1
2 TABLET ORAL
Qty: 0 | Refills: 0 | DISCHARGE

## 2020-10-27 RX ORDER — ASPIRIN/CALCIUM CARB/MAGNESIUM 324 MG
81 TABLET ORAL DAILY
Refills: 0 | Status: DISCONTINUED | OUTPATIENT
Start: 2020-10-27 | End: 2020-11-03

## 2020-10-27 RX ORDER — ATORVASTATIN CALCIUM 80 MG/1
20 TABLET, FILM COATED ORAL AT BEDTIME
Refills: 0 | Status: DISCONTINUED | OUTPATIENT
Start: 2020-10-27 | End: 2020-11-03

## 2020-10-27 RX ORDER — METOPROLOL TARTRATE 50 MG
25 TABLET ORAL
Refills: 0 | Status: DISCONTINUED | OUTPATIENT
Start: 2020-10-27 | End: 2020-10-31

## 2020-10-27 RX ORDER — POTASSIUM CHLORIDE 20 MEQ
1 PACKET (EA) ORAL
Qty: 0 | Refills: 0 | DISCHARGE

## 2020-10-27 RX ORDER — ONDANSETRON 8 MG/1
4 TABLET, FILM COATED ORAL EVERY 6 HOURS
Refills: 0 | Status: DISCONTINUED | OUTPATIENT
Start: 2020-10-27 | End: 2020-11-03

## 2020-10-27 RX ORDER — FUROSEMIDE 40 MG
40 TABLET ORAL DAILY
Refills: 0 | Status: DISCONTINUED | OUTPATIENT
Start: 2020-10-28 | End: 2020-10-28

## 2020-10-27 RX ORDER — FUROSEMIDE 40 MG
40 TABLET ORAL ONCE
Refills: 0 | Status: COMPLETED | OUTPATIENT
Start: 2020-10-27 | End: 2020-10-27

## 2020-10-27 RX ORDER — LANOLIN ALCOHOL/MO/W.PET/CERES
5 CREAM (GRAM) TOPICAL AT BEDTIME
Refills: 0 | Status: DISCONTINUED | OUTPATIENT
Start: 2020-10-27 | End: 2020-11-03

## 2020-10-27 RX ORDER — LEVOTHYROXINE SODIUM 125 MCG
112 TABLET ORAL DAILY
Refills: 0 | Status: DISCONTINUED | OUTPATIENT
Start: 2020-10-27 | End: 2020-10-29

## 2020-10-27 RX ORDER — ACETAMINOPHEN 500 MG
650 TABLET ORAL EVERY 4 HOURS
Refills: 0 | Status: DISCONTINUED | OUTPATIENT
Start: 2020-10-27 | End: 2020-11-03

## 2020-10-27 RX ORDER — DILTIAZEM HCL 120 MG
180 CAPSULE, EXT RELEASE 24 HR ORAL DAILY
Refills: 0 | Status: DISCONTINUED | OUTPATIENT
Start: 2020-10-27 | End: 2020-10-31

## 2020-10-27 RX ORDER — LANOLIN ALCOHOL/MO/W.PET/CERES
2 CREAM (GRAM) TOPICAL
Qty: 0 | Refills: 0 | DISCHARGE

## 2020-10-27 RX ORDER — LANOLIN ALCOHOL/MO/W.PET/CERES
1 CREAM (GRAM) TOPICAL
Qty: 0 | Refills: 0 | DISCHARGE

## 2020-10-27 RX ADMIN — ATORVASTATIN CALCIUM 20 MILLIGRAM(S): 80 TABLET, FILM COATED ORAL at 21:33

## 2020-10-27 RX ADMIN — Medication 40 MILLIGRAM(S): at 13:15

## 2020-10-27 RX ADMIN — Medication 25 MILLIGRAM(S): at 21:33

## 2020-10-27 RX ADMIN — Medication 5 MILLIGRAM(S): at 21:33

## 2020-10-27 NOTE — ED STATDOCS - PROGRESS NOTE DETAILS
Kalpesh Kraft for attending Dr. Hendricks: 89 y/o female with a PMHx of Afib, HTN, HLD, hypothyroid presents to the ED sent by PMD. As per son at bedside, pt had a chest XR last week showing "water on her lungs." +SOB. Denies cough, fever. Pt has been compliant with Lasix. No other complaints at this time. Cardio: Dr. Aj. Will send pt to main ED for further evaluation.

## 2020-10-27 NOTE — ED ADULT NURSE NOTE - NSIMPLEMENTINTERV_GEN_ALL_ED
Implemented All Fall with Harm Risk Interventions:  [place] to call system. Call bell, personal items and telephone within reach. Instruct patient to call for assistance. Room bathroom lighting operational. Non-slip footwear when patient is off stretcher. Physically safe environment: no spills, clutter or unnecessary equipment. Stretcher in lowest position, wheels locked, appropriate side rails in place. Provide visual cue, wrist band, yellow gown, etc. Monitor gait and stability. Monitor for mental status changes and reorient to person, place, and time. Review medications for side effects contributing to fall risk. Reinforce activity limits and safety measures with patient and family. Provide visual clues: red socks.

## 2020-10-27 NOTE — H&P ADULT - NSHPPHYSICALEXAM_GEN_ALL_CORE
ICU Vital Signs Last 24 Hrs  T(C): 36.6 (27 Oct 2020 10:39), Max: 36.6 (27 Oct 2020 10:39)  T(F): 97.8 (27 Oct 2020 10:39), Max: 97.8 (27 Oct 2020 10:39)  HR: 71 (27 Oct 2020 10:39) (71 - 71)  BP: 115/56 (27 Oct 2020 10:39) (115/56 - 115/56)  BP(mean): 70 (27 Oct 2020 10:39) (70 - 70)  ABP: --  ABP(mean): --  RR: 24 (27 Oct 2020 10:39) (24 - 24)  SpO2: 96% (27 Oct 2020 10:39) (96% - 96%)      PHYSICAL EXAM:    Constitutional: NAD, awake and alert, well-developed  HEENT: PERR, EOMI, Normal Hearing, MMM  Neck: Soft and supple, No LAD, No JVD  Respiratory: decreased breath sounds  Cardiovascular: S1 and S2, regular rate and rhythm, no Murmurs, gallops or rubs  Gastrointestinal: Bowel Sounds present, soft, nontender, nondistended, no guarding, no rebound  Extremities: No peripheral edema  Vascular: 2+ peripheral pulses  Neurological: A/O x 3, no focal deficits  Musculoskeletal: 5/5 strength b/l upper and lower extremities  Skin: No rashes

## 2020-10-27 NOTE — ED PROVIDER NOTE - CLINICAL SUMMARY MEDICAL DECISION MAKING FREE TEXT BOX
89 y/o female presenting with SOB. EKG, CXR, Labs, Admit 89 y/o female presenting with SOB, known pleural effusion. EKG, CXR, Labs, Admit

## 2020-10-27 NOTE — ED ADULT TRIAGE NOTE - CHIEF COMPLAINT QUOTE
Patient comes in stating she has "water in her lungs". Patient denies shortness of breath/chest pain. no signs of acute distress noted.

## 2020-10-27 NOTE — ED ADULT NURSE NOTE - OBJECTIVE STATEMENT
PT hx CHF, afib on coumadin presents with SOb, weakness, decrease appetite.  Son at bedside states pt had outpatient xray that showed "water on the lungs".  Pt noted to be tachypneic, NC placed with relief.  VSS, afebrile with no cough noted.

## 2020-10-27 NOTE — ED PROVIDER NOTE - MUSCULOSKELETAL, MLM
Spine appears normal, range of motion is not limited, no muscle or joint tenderness +Bilateral pitting edema, Left greater than right (reported to be baseline).

## 2020-10-27 NOTE — H&P ADULT - ASSESSMENT
#Moderate sized L pleural effusion  # Acute on chronic decompensated HFpEF  # Positive trops in setting of chf  - admit to tele  - Start 40 IV QD   - Daily weights  - Strict I/Os  - Awaiting EKG  - Denies chest pain at present  - Trend trops  - follow up 2D echo  - Family/patient declining thoracentesis  - NIPPV QHS to assist with diuresis  - Consult Pulm Dr Barber      #Afib on coumadin  - INR 4.9  - No e/o bleeding  - Allow inr to drift         #Moderate sized L pleural effusion  # Acute on chronic decompensated HFpEF  # Positive trops in setting of chf  - admit to tele  - Start 40 IV QD   - Daily weights  - Strict I/Os  - Awaiting EKG  - Denies chest pain at present  - Trend trops  - follow up 2D echo  - Family/patient declining thoracentesis however are willing to revisit if diuresis unsuccessful  - NIPPV QHS to assist with diuresis  - Consult Pulm Dr Barber      #Afib on coumadin  - INR 4.9  - No e/o bleeding  - Allow inr to drift      D/W Son pratima at bedside.

## 2020-10-27 NOTE — ED PROVIDER NOTE - PROGRESS NOTE DETAILS
Dereck PURDY: Patient is DNR/DNI per molst at bedside; patient endorsed to Hospitalist for admission.

## 2020-10-27 NOTE — H&P ADULT - NSHPLABSRESULTS_GEN_ALL_CORE
LABS: All Labs Reviewed:                        13.4   8.34  )-----------( 167      ( 27 Oct 2020 11:11 )             40.4     10-27    129<L>  |  96  |  18  ----------------------------<  116<H>  4.2   |  25  |  0.82    Ca    8.9      27 Oct 2020 11:11  Mg     2.0     10-27    TPro  7.2  /  Alb  3.0<L>  /  TBili  1.1  /  DBili  x   /  AST  30  /  ALT  19  /  AlkPhos  62  10-27    PT/INR - ( 27 Oct 2020 11:11 )   PT: 53.4 sec;   INR: 4.97 ratio         PTT - ( 27 Oct 2020 11:11 )  PTT:40.6 sec        Blood Culture:         EKG

## 2020-10-27 NOTE — ED PROVIDER NOTE - OBJECTIVE STATEMENT
89 y/o female with a PMHx of Afib, HTN, HLD, hypothyroid presents to the ED sent by PMD. As per son at bedside, pt had a chest XR last week showing "water on her lungs." Pt has a history of pleural effusion last month. Per son, pt with decreased PO intake. +SOB. Pt is able to ambulate with walker at baseline, and currently able to ambulate in home with walker, but has difficulty with longer distances. Pt also has peripheral edema bilaterally, left more than right, but this is reported to be baseline. Denies cough, fever, pain in arms, legs, CP, abd pain. Pt has been compliant with Lasix. No other complaints at this time. ALLERGIES: Bactrim. Cardio: Dr. Aj.    Per MOLST form, pt is DNR/DNI. 89 y/o female with a PMHx of Afib, HTN, HLD, hypothyroid presents to the ED sent by PMD. As per son at bedside, pt had a chest XR last week showing "water on her lungs." Pt has a history of pleural effusion- Per son, pt with decreased PO intake over last few days. +SOB. Pt is able to ambulate with walker at baseline, and currently able to ambulate in home with walker, but has difficulty with longer distances. Pt also has peripheral edema bilaterally, left more than right, but this is reported to be baseline. Denies cough, fever, pain in arms, legs, CP, abd pain. Pt has been compliant with Lasix. No other complaints at this time. ALLERGIES: Bactrim. Cardio: Dr. Aj.    Per MOLST form, pt is DNR/DNI.

## 2020-10-27 NOTE — PATIENT PROFILE ADULT - DEAF OR HARD OF HEARING?
1. Attempt #:1    2. [facility]Connect Verified: yes    3. Verify PCP: yes    4. Review Care Team: yes    5. WebIZ Checked & Epic Updated: Yes   Tdap   Zoster Min (Shingrix)         WebIZ Recommendations: TDAP and SHINGRIX (Shingles)  · Is patient due for Tdap? YES. Patient was notified of copay/out of pocket cost.  · Is patient due for Shingles? YES. Patient was notified of copay/out of pocket cost.    6. Reviewed/Updated the following with patient:       •   Communication Preference Obtained? YES       •   Preferred Pharmacy? YES       •   Preferred Lab? YES       •   Family History (document living status of immediate family members and if + hx of cancer, diabetes, hypertension, hyperlipidemia, heart attack, stroke) NO patient went over on the 18th of January     7. Annual Wellness Visit Scheduling  · Scheduling Status:Scheduled     8. Care Gap Scheduling (Attempt to Schedule EACH Overdue Care Gap!)     Health Maintenance Due   Topic Date Due   • IMM HEP B VACCINE (1 of 3 - Risk 3-dose series) 03/19/1965   • IMM DTaP/Tdap/Td Vaccine (1 - Tdap) 03/19/1965   • IMM ZOSTER VACCINES (1 of 2) 03/19/1996   • FASTING LIPID PROFILE  10/04/2018   • DIABETES MONOFILAMENT / LE EXAM  11/02/2018        Scheduled patient for Annual Wellness Visit     9. Rayn Activation: already active    10. Rayn Dara: no    11. Virtual Visits: no    12. Opt In to Text Messages: yes    13. Patient was advised: “This is a free wellness visit. The provider will screen for medical conditions to help you stay healthy. If you have other concerns to address you may be asked to discuss these at a separate visit or there may be an additional fee.”     14. Patient was informed to arrive 15 min prior to their scheduled appointment and bring in their medication bottles.     no

## 2020-10-27 NOTE — ED ADULT NURSE NOTE - NSFALLRSKHARMRISK_ED_ALL_ED
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  Blood cultures from her tunneled Elizabeth line noted to be positive for Staph aureus (2 of 2 on 7/23 in 18 hours, 1 of 1 on 7/24 in 13 hours).  Tunneled line removed yesterday and tip sent for culture, pending.  Nucleic acid amplification negative for MRSA, formal sensitivities pending.  PICC line placed in L arm to continue prostacyclin infusion.    Transferred to ICU 7/25 AM with increased shortness of breath, need for Bipap to maintain saturations.  Given one dose lasix with excellent UO, no change in oxygen needs.    Hospital Problems:  1. Central line-associated bloodstream infection - present on admission  2. Staph aureus bacteremia  3. Acute hypoxic respiratory failure secondary to #1  4. Severe PAH, WHO category IV    Plan:  1. Neuro:  - Sedation: None  - Fatigued from poor sleep last several days, but otherwise no concerns    2. CV:  - Vasopressors: None  - Continues on Veletri, 8.5 ng/kg/min via L PICC line  - d/w Dr. Michel - could she be on PO medications as alternative therapy?  She is fed up with the hassle of Veletri and would like to explore that option.  - Awaiting contact from [facility] re: pulmonary endarterectomy    3. Pulmonary:  - CXR: New opacity in the inferior portion of the RUL, stable mild perihilar vascular congestion.  PICC line L arm.  - Discussed with Dr. Hitchcock - sleep study negative for KATHY, did have desaturations and would qualify for home O2   - Wean oxygen as tolerated, maintain SpO2 > 92%  - Repeat CXR in AM    4. Renal/FEN:  - Stable BUN/Cr, lytes  - Diuretic off after single dose yesterday  - Supplement electrolytes as needed    5. GI:  - No concerns    6. Endocrine:  - No concerns    7. Heme:  - Chronic anticoagulation with  INR 2.7  - Hold iron while bacteremic, resume following    8. ID:  - Multiple positive blood cultures for Staph aureus, nucleic acid amplification negative for MRSA but formal sensitivities pending.  - Tunneled line removed, tip culture pending  - Time to positivity decreased from 7/23 to 7/24?  - Will need evaluation for source and potential endocarditis, but will wait until respiratory status improves, if possible.  Discussed with Dr. Perez and will potentially plan for BATSHEVA on Friday 7/28    9: Best Practices:  - DVT prophylaxis: INR 2.7  - GI prophylaxis: n/a  - Glycemic control: Intrinsic  - LDA: PICC  - Nutrition: PO diet ad liana    Can assess transfer out of ICU when O2 needs improving.  Currently still on 7 LPM, was reportedly on Bipap overnight for a couple hours.    Level of service: [id number]      Jeremy F Siegrist, MD      Subjective:  Interval History:   Continued loose stools, total 3 episodes last 24 hours.    Objective:  Vital signs for last 24 hours:  Temp:  [97.6 °F (36.4 °C)-99.6 °F (37.6 °C)] 99.6 °F (37.6 °C)  Pulse:  [] 108  Resp:  [5-40] 35  BP: ()/(52-86) 109/75  FiO2 (%):  [40 %-70 %] 50 %    Intake/Output last 3 shifts:  I/O last 3 completed shifts:  In: 1433 [P.O.:360; I.V.:809; IV Piggyback:264]  Out: 145 [Urine:145]    Intake/Output this shift:  No intake/output data recorded.    Gen: Awake, alert.  Talkative.    HEENT: Sclera clear, pupils equal and reactive.   Neck: Supple.  No adenopathy.  No JVD appreciated.  Chest: Clear bilaterally without w/r/r.  Remains on 7 LPM, but less dyspneic today - able to speak in full sentences.  CV: RRR, prominent RV impulse.  Elizabeth site R anterior chest with dressing in place.  Abd: Soft, NT, ND.  Ext: Warm and well-perfused.  Neuro: Nonfocal examination.    Labs and imaging reviewed in the EMR.  Pertinent results as noted above.       yes

## 2020-10-27 NOTE — ED ADULT NURSE NOTE - CAS DISCH ACCOMP BY

## 2020-10-27 NOTE — H&P ADULT - HISTORY OF PRESENT ILLNESS
89 yo F with pmh afib on coumadin, HTN, Hyperlipidemia, hypothyroidism, h/o HFpEF, h/o pleural effusions treated with diuresis now presenting with complaints of sob and weakness. She was found to have a left sided pleural effusion on an o/p CXR. Endorses that her lasix was temporarily held as it was initially thought her ssx were related to dehydration. She has since been restarted on lasix 40 as opposed to lasix 20 po qd and spironolactone was also added.   ED was found to have mod to large left sided pleural effusion. Was not found to be hypoxic on admission. +B/L LE edema  Otherwise denies cp. No N/V/D. No hemoptysis. TNI 0.07. No ekg changes. Na 129. INR 4.9 with no e/o bleeding       PMH:   - Atrial fibrillation  - Diastolic Dysfunction CHF  - Hypothyroidism  - HTN  - HLD    PSHx: unknown  Allergies: bactrim but tolerates sulfa drugs.   Social Hx: lives at home with son who is HCP. Uses walker for ambulation. Nonsmoker, no ETOH

## 2020-10-28 LAB
ADD ON TEST-SPECIMEN IN LAB: SIGNIFICANT CHANGE UP
ANION GAP SERPL CALC-SCNC: 2 MMOL/L — LOW (ref 5–17)
BUN SERPL-MCNC: 17 MG/DL — SIGNIFICANT CHANGE UP (ref 7–23)
CALCIUM SERPL-MCNC: 8.9 MG/DL — SIGNIFICANT CHANGE UP (ref 8.5–10.1)
CHLORIDE SERPL-SCNC: 101 MMOL/L — SIGNIFICANT CHANGE UP (ref 96–108)
CO2 SERPL-SCNC: 33 MMOL/L — HIGH (ref 22–31)
CREAT SERPL-MCNC: 0.77 MG/DL — SIGNIFICANT CHANGE UP (ref 0.5–1.3)
GLUCOSE SERPL-MCNC: 94 MG/DL — SIGNIFICANT CHANGE UP (ref 70–99)
INR BLD: 5.01 RATIO — CRITICAL HIGH (ref 0.88–1.16)
POTASSIUM SERPL-MCNC: 4.3 MMOL/L — SIGNIFICANT CHANGE UP (ref 3.5–5.3)
POTASSIUM SERPL-SCNC: 4.3 MMOL/L — SIGNIFICANT CHANGE UP (ref 3.5–5.3)
PROTHROM AB SERPL-ACNC: 54.3 SEC — HIGH (ref 10.6–13.6)
SARS-COV-2 IGG SERPL QL IA: NEGATIVE — SIGNIFICANT CHANGE UP
SARS-COV-2 IGM SERPL IA-ACNC: <3.8 AU/ML — SIGNIFICANT CHANGE UP
SODIUM SERPL-SCNC: 136 MMOL/L — SIGNIFICANT CHANGE UP (ref 135–145)

## 2020-10-28 PROCEDURE — 93306 TTE W/DOPPLER COMPLETE: CPT | Mod: 26

## 2020-10-28 PROCEDURE — 93010 ELECTROCARDIOGRAM REPORT: CPT

## 2020-10-28 PROCEDURE — 99232 SBSQ HOSP IP/OBS MODERATE 35: CPT

## 2020-10-28 RX ORDER — FUROSEMIDE 40 MG
20 TABLET ORAL THREE TIMES A DAY
Refills: 0 | Status: DISCONTINUED | OUTPATIENT
Start: 2020-10-28 | End: 2020-10-30

## 2020-10-28 RX ORDER — FUROSEMIDE 40 MG
20 TABLET ORAL THREE TIMES A DAY
Refills: 0 | Status: DISCONTINUED | OUTPATIENT
Start: 2020-10-28 | End: 2020-10-28

## 2020-10-28 RX ADMIN — Medication 81 MILLIGRAM(S): at 10:06

## 2020-10-28 RX ADMIN — Medication 20 MILLIGRAM(S): at 14:30

## 2020-10-28 RX ADMIN — Medication 20 MILLIGRAM(S): at 21:31

## 2020-10-28 RX ADMIN — Medication 112 MICROGRAM(S): at 05:04

## 2020-10-28 RX ADMIN — Medication 25 MILLIGRAM(S): at 21:31

## 2020-10-28 RX ADMIN — ATORVASTATIN CALCIUM 20 MILLIGRAM(S): 80 TABLET, FILM COATED ORAL at 21:31

## 2020-10-28 RX ADMIN — Medication 180 MILLIGRAM(S): at 10:06

## 2020-10-28 RX ADMIN — Medication 25 MILLIGRAM(S): at 10:06

## 2020-10-28 RX ADMIN — Medication 5 MILLIGRAM(S): at 21:31

## 2020-10-28 NOTE — CONSULT NOTE ADULT - ASSESSMENT
1) ADHF  2) Dyspnea  3) Bilateral Effusions  4) Abnormal CT Chest     89 yo F with pmh afib on coumadin, HTN, Hyperlipidemia, hypothyroidism, h/o HFpEF, h/o pleural effusions treated with diuresis now presenting with complaints of sob and weakness. She was found to have a left sided pleural effusion on an o/p CXR. Endorses that her lasix was temporarily held as it was initially thought her ssx were related to dehydration. She has since been restarted on lasix 40 as opposed to lasix 20 po qd and spironolactone was also added.   ED was found to have mod to large left sided pleural effusion. Was not found to be hypoxic on admission. +B/L LE edema  Otherwise denies cp. No N/V/D. No hemoptysis. TNI 0.07. No ekg changes. Na 129. INR 4.9 with no e/o bleeding   Reviewed CT Chest  Agree with HF management  INR is supratherapeutic >5  Will likely need right sided thoracentesis once INR <2  Will discuss with hospitalist and cardiology

## 2020-10-28 NOTE — PROGRESS NOTE ADULT - SUBJECTIVE AND OBJECTIVE BOX
CC/reason for follow up: *    S: *    ROS: no chest pain, no dyspnea    T(C): 36.4 (10-28-20 @ 08:10), Max: 36.6 (10-27-20 @ 10:39)  HR: 57 (10-28-20 @ 08:10) (56 - 80)  BP: 121/72 (10-28-20 @ 08:10) (98/74 - 121/72)  RR: 16 (10-28-20 @ 08:10) (16 - 24)  SpO2: 100% (10-28-20 @ 08:10) (95% - 100%)    Gen - Pleasant, cooperative, in no acute distress  HEENT- PERRL, moist mucus membranes, OP clear  CV - RRR, No m/r/g, +pulses, * edema  Lungs - Good effort, Clear to auscultation bilaterally  Abdomen - Soft, nontender, nondistended, +BS, No rebound/rigidity/guarding  Ext - No cyanosis/clubbing.   Skin - No rashes, No jaundice  Psych- Alert & oriented x 3  Neuro- *    MEDICATIONS  (STANDING):  aspirin  chewable 81 milliGRAM(s) Oral daily  atorvastatin 20 milliGRAM(s) Oral at bedtime  diltiazem    milliGRAM(s) Oral daily  furosemide   IVPB 20 milliGRAM(s) IV Intermittent three times a day  levothyroxine 112 MICROGram(s) Oral daily  melatonin 5 milliGRAM(s) Oral at bedtime  metoprolol tartrate 25 milliGRAM(s) Oral two times a day    MEDICATIONS  (PRN):  acetaminophen   Tablet .. 650 milliGRAM(s) Oral every 4 hours PRN Mild Pain (1 - 3)  aluminum hydroxide/magnesium hydroxide/simethicone Suspension 30 milliLiter(s) Oral every 4 hours PRN Dyspepsia  ondansetron Injectable 4 milliGRAM(s) IV Push every 6 hours PRN Nausea      Diagnostic studies: personally reviewed  LABS: All Labs Reviewed:                        13.4   8.34  )-----------( 167      ( 27 Oct 2020 11:11 )             40.4     10-28    136  |  101  |  17  ----------------------------<  94  4.3   |  33<H>  |  0.77    Ca    8.9      28 Oct 2020 07:47  Mg     2.0     10-27    TPro  7.2  /  Alb  3.0<L>  /  TBili  1.1  /  DBili  x   /  AST  30  /  ALT  19  /  AlkPhos  62  10-27    PT/INR - ( 28 Oct 2020 07:47 )   PT: 54.3 sec;   INR: 5.01 ratio         PTT - ( 27 Oct 2020 11:11 )  PTT:40.6 sec  CARDIAC MARKERS ( 27 Oct 2020 18:06 )  <0.015 ng/mL / x     / x     / x     / x      CARDIAC MARKERS ( 27 Oct 2020 14:52 )  <0.015 ng/mL / x     / x     / x     / x            Blood Culture:   RADIOLOGY/EKG:             CC/reason for follow up: SOB, pleural effusion    S: feeling better. denies any dyspnea at this time. no dizziness    ROS: no chest pain, no dyspnea    T(C): 36.4 (10-28-20 @ 08:10), Max: 36.6 (10-27-20 @ 10:39)  HR: 57 (10-28-20 @ 08:10) (56 - 80)  BP: 121/72 (10-28-20 @ 08:10) (98/74 - 121/72)  RR: 16 (10-28-20 @ 08:10) (16 - 24)  SpO2: 100% (10-28-20 @ 08:10) (95% - 100%)    Gen - Pleasant, cooperative, in no acute distress  HEENT- PERRL, moist mucus membranes, OP clear  CV - IRIR, No m/r/g, +pulses, no edema  Lungs - Good effort, Clear to auscultation bilaterally, slightly diminished in the bases b/l  Abdomen - Soft, nontender, nondistended, +BS, No rebound/rigidity/guarding  Ext - No cyanosis/clubbing.   Skin - No rashes, No jaundice  Psych- Alert & oriented x 3  Neuro- fluent speech, no facial droop, EOMI. moves all ext    MEDICATIONS  (STANDING):  aspirin  chewable 81 milliGRAM(s) Oral daily  atorvastatin 20 milliGRAM(s) Oral at bedtime  diltiazem    milliGRAM(s) Oral daily  furosemide   IVPB 20 milliGRAM(s) IV Intermittent three times a day  levothyroxine 112 MICROGram(s) Oral daily  melatonin 5 milliGRAM(s) Oral at bedtime  metoprolol tartrate 25 milliGRAM(s) Oral two times a day    MEDICATIONS  (PRN):  acetaminophen   Tablet .. 650 milliGRAM(s) Oral every 4 hours PRN Mild Pain (1 - 3)  aluminum hydroxide/magnesium hydroxide/simethicone Suspension 30 milliLiter(s) Oral every 4 hours PRN Dyspepsia  ondansetron Injectable 4 milliGRAM(s) IV Push every 6 hours PRN Nausea      Diagnostic studies: personally reviewed  LABS: All Labs Reviewed:                        13.4   8.34  )-----------( 167      ( 27 Oct 2020 11:11 )             40.4     10-28    136  |  101  |  17  ----------------------------<  94  4.3   |  33<H>  |  0.77    Ca    8.9      28 Oct 2020 07:47  Mg     2.0     10-27    TPro  7.2  /  Alb  3.0<L>  /  TBili  1.1  /  DBili  x   /  AST  30  /  ALT  19  /  AlkPhos  62  10-27    PT/INR - ( 28 Oct 2020 07:47 )   PT: 54.3 sec;   INR: 5.01 ratio         PTT - ( 27 Oct 2020 11:11 )  PTT:40.6 sec  CARDIAC MARKERS ( 27 Oct 2020 18:06 )  <0.015 ng/mL / x     / x     / x     / x      CARDIAC MARKERS ( 27 Oct 2020 14:52 )  <0.015 ng/mL / x     / x     / x     / x            Blood Culture:   RADIOLOGY/EKG:      < from: CT Chest No Cont (10.27.20 @ 12:43) >    IMPRESSION:  Moderate bilateral pleural effusions, right side greater than left.  Upper abdominal ascites.    < end of copied text >

## 2020-10-28 NOTE — CONSULT NOTE ADULT - SUBJECTIVE AND OBJECTIVE BOX
Patient is a 89y old  Female who presents with a chief complaint of sob/weakness (27 Oct 2020 13:02)      HPI:      89 yo F with pmh afib on coumadin, HTN, Hyperlipidemia, hypothyroidism, h/o HFpEF, h/o pleural effusions treated with diuresis now presenting with complaints of sob and weakness. She was found to have a left sided pleural effusion on an o/p CXR. Endorses that her lasix was temporarily held as it was initially thought her ssx were related to dehydration. She has since been restarted on lasix 40 as opposed to lasix 20 po qd and spironolactone was also added.   ED was found to have mod to large left sided pleural effusion. Was not found to be hypoxic on admission. +B/L LE edema  Otherwise denies cp. No N/V/D. No hemoptysis. TNI 0.07. No ekg changes. Na 129. INR 4.9 with no e/o bleeding             PREVIOUS DIAGNOSTIC TESTING:      MEDICATIONS  (STANDING):  aspirin  chewable 81 milliGRAM(s) Oral daily  atorvastatin 20 milliGRAM(s) Oral at bedtime  diltiazem    milliGRAM(s) Oral daily  furosemide   Injectable 40 milliGRAM(s) IV Push daily  levothyroxine 112 MICROGram(s) Oral daily  melatonin 5 milliGRAM(s) Oral at bedtime  metoprolol tartrate 25 milliGRAM(s) Oral two times a day    MEDICATIONS  (PRN):  acetaminophen   Tablet .. 650 milliGRAM(s) Oral every 4 hours PRN Mild Pain (1 - 3)  aluminum hydroxide/magnesium hydroxide/simethicone Suspension 30 milliLiter(s) Oral every 4 hours PRN Dyspepsia  ondansetron Injectable 4 milliGRAM(s) IV Push every 6 hours PRN Nausea      FAMILY HISTORY:      SOCIAL HISTORY:  ***    REVIEW OF SYSTEM:  dyspnea, all other systems reviewed and are negative    Vital Signs Last 24 Hrs  T(C): 36.3 (28 Oct 2020 05:12), Max: 36.6 (27 Oct 2020 10:39)  T(F): 97.3 (28 Oct 2020 05:12), Max: 97.8 (27 Oct 2020 10:39)  HR: 62 (28 Oct 2020 05:12) (56 - 80)  BP: 103/50 (28 Oct 2020 05:12) (98/74 - 116/65)  BP(mean): 76 (27 Oct 2020 15:01) (70 - 76)  RR: 16 (28 Oct 2020 05:12) (16 - 24)  SpO2: 98% (28 Oct 2020 05:12) (95% - 100%)    I&O's Summary    27 Oct 2020 07:01  -  28 Oct 2020 07:00  --------------------------------------------------------  IN: 100 mL / OUT: 120 mL / NET: -20 mL      PHYSICAL EXAM  General Appearance: cooperative, no acute distress,   HEENT: PERRL, conjunctiva clear, EOM's intact, non injected pharynx, no exudate, TM   normal  Neck: Supple, , no adenopathy, thyroid: not enlarged, no carotid bruit or JVD  Back: Symmetric, no  tenderness,no soft tissue tenderness  Lungs: diminished bilaterally  Heart: Regular rate and rhythm, S1, S2 normal, no murmur, rub or gallop  Abdomen: Soft, non-tender, bowel sounds active , no hepatosplenomegaly  Extremities: +GAVINO  Skin: Skin color, texture normal, no rashes   Neurologic: Alert and oriented X3 , cranial nerves intact, sensory and motor normal,    ECG:    LABS:                          13.4   8.34  )-----------( 167      ( 27 Oct 2020 11:11 )             40.4     10-28    136  |  101  |  17  ----------------------------<  94  4.3   |  33<H>  |  0.77    Ca    8.9      28 Oct 2020 07:47  Mg     2.0     10-27    TPro  7.2  /  Alb  3.0<L>  /  TBili  1.1  /  DBili  x   /  AST  30  /  ALT  19  /  AlkPhos  62  10-27    CARDIAC MARKERS ( 27 Oct 2020 18:06 )  <0.015 ng/mL / x     / x     / x     / x      CARDIAC MARKERS ( 27 Oct 2020 14:52 )  <0.015 ng/mL / x     / x     / x     / x            Pro BNP  9443 10-27 @ 11:11  D Dimer  -- 10-27 @ 11:11    PT/INR - ( 28 Oct 2020 07:47 )   PT: 54.3 sec;   INR: 5.01 ratio         PTT - ( 27 Oct 2020 11:11 )  PTT:40.6 sec          RADIOLOGY & ADDITIONAL STUDIES:

## 2020-10-28 NOTE — PROGRESS NOTE ADULT - ASSESSMENT
Assessment and Plan:    89 yo F with pmh afib on coumadin, HTN, Hyperlipidemia, hypothyroidism, h/o HFpEF, h/o pleural effusions treated with diuresis now presenting with complaints of sob and weakness. She was found to have a left sided pleural effusion on an o/p CXR    #Moderate sized L pleural effusion  # Acute on chronic decompensated HFpEF  # Positive trops in setting of chf  - tele  - Lasix 40 IV QD   - Daily weights  - Strict I/Os  - Denies chest pain at present  - Trend trops  - follow up 2D echo  - Family/patient declining thoracentesis however are willing to revisit if diuresis unsuccessful  - NIPPV QHS to assist with diuresis  - Consult Pulm Dr Barber      #Afib on coumadin  - INR 4.9  - No e/o bleeding  - Allow inr to drift        Advance directives/GOC/Code status: *  Dispo: inpatient Assessment and Plan:    89 yo F with pmh afib on coumadin, HTN, Hyperlipidemia, hypothyroidism, h/o HFpEF, h/o pleural effusions treated with diuresis now presenting with complaints of sob and weakness. She was found to have a left sided pleural effusion on an o/p CXR    #Moderate sized L pleural effusion  # Acute on chronic decompensated HFpEF  # Positive trops in setting of chf  - tele  - Lasix 20 mg IV TID as rec by cardio  - Daily weights, Strict I/Os  - Denies chest pain at present  - serial trops neg  - follow up 2D echo  - Family/patient declining thoracentesis however are willing to revisit if diuresis unsuccessful. currently INR supratherapeutic  - NIPPV QHS to assist with diuresis  - pulm and Cardiology consults appreciated      #Afib on coumadin  - INR supratherapeutic   - No e/o bleeding  - Allow inr to drift  - hold coumadin      Advance directives/GOC/Code status: DNR  Dispo: inpatient/ tele

## 2020-10-28 NOTE — CONSULT NOTE ADULT - SUBJECTIVE AND OBJECTIVE BOX
EKG:  atrial fibrillation at 75 BPM, non specific changes.    Rec: Lasix TID  Full note to follow.     Thank you,  ALFONZO Ayala DO, FACC   Patient is a 89y old  Female who presents with a chief complaint of sob/weakness (28 Oct 2020 10:02)    ________________________________  ELIZABETH LACY is a 89y year old Female with a past medical history of chronic diastolic heart failure, chronic atrial fibrillation on anticoagulation with warfarin, hypertension, hyperlipidemia, moderate mitral valve regurgitation, moderate stenosis, with severe pulmonary hypertension who was hospitalized last year for heart failure and responded to diuretics.  The patient was seen in the office on 26, with complaints of shortness of breath, and advised to go to the ER given her symptoms of shortness of breath and pleural effusions on chest x-ray.  She declined.     She continued to feel short of breath and subsequently was sent to the ER.  She was admitted for acute diastolic heart failure.  Currently she is stable on oxygen without chest pain, or palpitations.  No dizziness or lightheadedness.                      PREVIOUS CARDIAC WORKUP:    Echocardiogram: October 26, 2020-  --There is severe left atrial dilatation (LA volume index 65 ml/m²).  --There is mild left ventricular hypertrophy.  --LV global wall motion is hypokinetic.  --LV ejection fraction (50 %) is borderline normal.  --Left ventricular ejection fraction 50 to 55%. There is asymmetric apical hypertrophy present.  --There is severe right atrial dilatation.  --The aortic root is normal in size (3.40 cm). Ascending aorta is dilated; its diameter is 3.90   cm (2.6 cm/m²).  --The aortic valve is moderately calcified. There is moderate aortic stenosis. The aortic valve   area, by the continuity equation, is 1.03 cm². There is mild aortic regurgitation.  --There is mild to moderate mitral regurgitation.  --There is moderate to severe tricuspid regurgitation.  --There is mild pulmonic regurgitation.  --The right atrial pressure is elevated (11-20 mm Hg). There is severe pulmonary hypertension.  --There is a trace pericardial effusion. Left pleural effusion is present. Right pleural   effusion is present.  Stress Test:  Cardiac Catheterization:  ________________________________  Review of systems: A 10 point review of system has been performed, and is negative except for what has been mentioned in the above history of present illness.     PAST MEDICAL & SURGICAL HISTORY:  Hypothyroid    Hyperlipidemia    HTN (hypertension)    AF (atrial fibrillation)      FAMILY HISTORY:     The patient denies any history of premature CAD or sudden cardiac death.    SOCIAL HISTORY: The patient denies any history of tobacco abuse, alcohol abuse or illicit drug use.  Bactrim (Unknown)    Home Medications:  Centrum Silver oral tablet: 1 tab(s) orally once a day (27 Oct 2020 13:23)  furosemide 20 mg oral tablet: 2 tab(s) orally 2 times a day (27 Oct 2020 13:23)  levothyroxine 112 mcg (0.112 mg) oral tablet: 1 tab(s) orally once a day (27 Oct 2020 13:23)  lisinopril 2.5 mg oral tablet: 0.5 tab(s) orally once a day (27 Oct 2020 13:23)  Melatonin 3 mg oral tablet: 2 tab(s) orally once (at bedtime) (27 Oct 2020 13:23)  Metoprolol Tartrate 25 mg oral tablet: 1 tab(s) orally 2 times a day (27 Oct 2020 13:23)  pravastatin 20 mg oral tablet: 1 tab(s) orally once a day (at bedtime) (27 Oct 2020 13:23)  warfarin 2 mg oral tablet: 1 tab(s) orally once a day (27 Oct 2020 13:23)    MEDICATIONS  (STANDING):  aspirin  chewable 81 milliGRAM(s) Oral daily  atorvastatin 20 milliGRAM(s) Oral at bedtime  diltiazem    milliGRAM(s) Oral daily  furosemide   Injectable 20 milliGRAM(s) IV Push three times a day  levothyroxine 112 MICROGram(s) Oral daily  melatonin 5 milliGRAM(s) Oral at bedtime  metoprolol tartrate 25 milliGRAM(s) Oral two times a day    MEDICATIONS  (PRN):  acetaminophen   Tablet .. 650 milliGRAM(s) Oral every 4 hours PRN Mild Pain (1 - 3)  aluminum hydroxide/magnesium hydroxide/simethicone Suspension 30 milliLiter(s) Oral every 4 hours PRN Dyspepsia  ondansetron Injectable 4 milliGRAM(s) IV Push every 6 hours PRN Nausea    Vital Signs Last 24 Hrs  T(C): 36.4 (28 Oct 2020 08:10), Max: 36.4 (27 Oct 2020 15:01)  T(F): 97.5 (28 Oct 2020 08:10), Max: 97.5 (27 Oct 2020 15:01)  HR: 57 (28 Oct 2020 08:10) (56 - 80)  BP: 121/72 (28 Oct 2020 08:10) (98/74 - 121/72)  BP(mean): 76 (27 Oct 2020 15:01) (76 - 76)  RR: 16 (28 Oct 2020 08:10) (16 - 21)  SpO2: 100% (28 Oct 2020 08:10) (95% - 100%)  I&O's Summary    27 Oct 2020 07:01  -  28 Oct 2020 07:00  --------------------------------------------------------  IN: 100 mL / OUT: 120 mL / NET: -20 mL      ________________________________  GENERAL APPEARANCE:  No acute distress  HEAD: normocephalic, atraumatic  NECK: supple, + jugular venous distention, no carotid bruit    HEART: Irregularly irregular, S1, S2 normal, 2/6 regurgitant murmur    CHEST:  No anterior chest wall tenderness    LUNGS: Coarse with decreased breath sounds at bases    ABDOMEN: soft, nontender, nondistended, with positive bowel sounds appreciated  EXTREMITIES: No clubbing bilateral extremity edema  NEURO: Alert and oriented x3  PSYC:  Normal affect  SKIN:  Dry  ________________________________   TELEMETRY: Rate controlled A. fib    ECG: Per my interpretation, rate controlled atrial fibrillation with nonspecific changes.    LABS:                        13.4   8.34  )-----------( 167      ( 27 Oct 2020 11:11 )             40.4             10-28    136  |  101  |  17  ----------------------------<  94  4.3   |  33<H>  |  0.77    Ca    8.9      28 Oct 2020 07:47  Mg     2.0     10-27    TPro  7.2  /  Alb  3.0<L>  /  TBili  1.1  /  DBili  x   /  AST  30  /  ALT  19  /  AlkPhos  62  10-27      LIVER FUNCTIONS - ( 27 Oct 2020 11:11 )  Alb: 3.0 g/dL / Pro: 7.2 gm/dL / ALK PHOS: 62 U/L / ALT: 19 U/L / AST: 30 U/L / GGT: x         PT/INR - ( 28 Oct 2020 07:47 )   PT: 54.3 sec;   INR: 5.01 ratio         PTT - ( 27 Oct 2020 11:11 )  PTT:40.6 sec    10-27 @ 18:06  Trop-I  <0.015  CK      --  CK-MB   --    10-27 @ 14:52  Trop-I  <0.015  CK      --  CK-MB   --    Pro BNP  9443 10-27 @ 11:11  D Dimer  -- 10-27 @ 11:11    PT/INR - ( 28 Oct 2020 07:47 )   PT: 54.3 sec;   INR: 5.01 ratio         PTT - ( 27 Oct 2020 11:11 )  PTT:40.6 sec      ________________________________    RADIOLOGY & ADDITIONAL STUDIES:IMPRESSION:  Moderate bilateral pleural effusions, right side greater than left.  Upper abdominal ascites.    ________________________________    ASSESSMENT:    Acute on chronic diastolic heart failure  Pleural Effusion on CT-similar to prior presentation  Chronic atrial fibrillation on anticoagulation  Supratherapeutic INR  Chronic venous insufficiency  Hypertension  Hyperlipidemia  Pulmonary hypertension valvular heart disease with aortic stenosis (aortic valve area of 1.03) and moderate mitral valve regurgitation        PLAN:  In summary, this is a 9-year-old female with a past medical history of valvular heart disease, chronic diastolic heart failure and atrial fibrillation admitted for acute diastolic heart failure.  I will continue with diuretics, but switch to Lasix 20 mg 3 times daily.  Hold warfarin given elevated INR.  Patient previously responded to diuretics and required a thoracentesis.  Hopefully we can avoid a thoracentesis should she responded well to diuretics.  Monitor chemistries and blood pressure.  Continue statin.  Continue Cardizem and metoprolol.  Her heart rate is currently controlled.  Blood pressure is on the low side.  Hold ACE inhibitor.  Check TSH and free T4    DVT and GI prophylaxis  __________________________________________________________________________  Thank you for allowing me to participate in the care of your patient. Please contact me should any questions arise.    ALFONZO Ayala DO, [facility]  [phone]

## 2020-10-29 LAB
ADD ON TEST-SPECIMEN IN LAB: SIGNIFICANT CHANGE UP
INR BLD: 3.93 RATIO — HIGH (ref 0.88–1.16)
PROTHROM AB SERPL-ACNC: 42.7 SEC — HIGH (ref 10.6–13.6)

## 2020-10-29 PROCEDURE — 99232 SBSQ HOSP IP/OBS MODERATE 35: CPT

## 2020-10-29 PROCEDURE — 93010 ELECTROCARDIOGRAM REPORT: CPT

## 2020-10-29 RX ORDER — LEVOTHYROXINE SODIUM 125 MCG
100 TABLET ORAL DAILY
Refills: 0 | Status: DISCONTINUED | OUTPATIENT
Start: 2020-10-29 | End: 2020-11-03

## 2020-10-29 RX ADMIN — Medication 20 MILLIGRAM(S): at 15:05

## 2020-10-29 RX ADMIN — Medication 5 MILLIGRAM(S): at 21:05

## 2020-10-29 RX ADMIN — Medication 20 MILLIGRAM(S): at 18:17

## 2020-10-29 RX ADMIN — Medication 20 MILLIGRAM(S): at 06:46

## 2020-10-29 RX ADMIN — Medication 100 MICROGRAM(S): at 11:39

## 2020-10-29 RX ADMIN — Medication 25 MILLIGRAM(S): at 11:40

## 2020-10-29 RX ADMIN — Medication 180 MILLIGRAM(S): at 11:39

## 2020-10-29 RX ADMIN — ATORVASTATIN CALCIUM 20 MILLIGRAM(S): 80 TABLET, FILM COATED ORAL at 21:06

## 2020-10-29 RX ADMIN — Medication 112 MICROGRAM(S): at 06:46

## 2020-10-29 RX ADMIN — Medication 81 MILLIGRAM(S): at 11:39

## 2020-10-29 RX ADMIN — Medication 25 MILLIGRAM(S): at 21:06

## 2020-10-29 NOTE — PHYSICAL THERAPY INITIAL EVALUATION ADULT - ADDITIONAL COMMENTS
Lives with son. 3 SYLVESTER with HR. Uses 3 wheeled RW. [place] style home. Independent with ADl and IADL.

## 2020-10-29 NOTE — PROGRESS NOTE ADULT - SUBJECTIVE AND OBJECTIVE BOX
The patient was seen and examined. No acute events overnight.  No chest pain.  Her shortness of breath is improved with stable creatinine.  She is tolerating diuresis without any issues.      _____________________________  ELIZABETH LACY is a 89y year old Female with a past medical history of chronic diastolic heart failure, chronic atrial fibrillation on anticoagulation with warfarin, hypertension, hyperlipidemia, moderate mitral valve regurgitation, moderate stenosis, with severe pulmonary hypertension who was hospitalized last year for heart failure and responded to diuretics.  The patient was seen in the office on 26, with complaints of shortness of breath, and advised to go to the ER given her symptoms of shortness of breath and pleural effusions on chest x-ray.  She declined.     She continued to feel short of breath and subsequently was sent to the ER.  She was admitted for acute diastolic heart failure.  Currently she is stable on oxygen without chest pain, or palpitations.  No dizziness or lightheadedness.    PREVIOUS CARDIAC WORKUP:    Echocardiogram: October 26, 2020-  --There is severe left atrial dilatation (LA volume index 65 ml/m²).  --There is mild left ventricular hypertrophy.  --LV global wall motion is hypokinetic.  --LV ejection fraction (50 %) is borderline normal.  --Left ventricular ejection fraction 50 to 55%. There is asymmetric apical hypertrophy present.  --There is severe right atrial dilatation.  --The aortic root is normal in size (3.40 cm). Ascending aorta is dilated; its diameter is 3.90   cm (2.6 cm/m²).  --The aortic valve is moderately calcified. There is moderate aortic stenosis. The aortic valve   area, by the continuity equation, is 1.03 cm². There is mild aortic regurgitation.  --There is mild to moderate mitral regurgitation.  --There is moderate to severe tricuspid regurgitation.  --There is mild pulmonic regurgitation.  --The right atrial pressure is elevated (11-20 mm Hg). There is severe pulmonary hypertension.  --There is a trace pericardial effusion. Left pleural effusion is present. Right pleural   effusion is present.     ________________________________  Review of systems: A 10 point review of system has been performed, and is negative except for what has been mentioned in the above history of present illness.     PAST MEDICAL & SURGICAL HISTORY:  Hypothyroid    Hyperlipidemia    HTN (hypertension)    AF (atrial fibrillation)         Home Medications:  Centrum Silver oral tablet: 1 tab(s) orally once a day (27 Oct 2020 13:23)  furosemide 20 mg oral tablet: 2 tab(s) orally 2 times a day (27 Oct 2020 13:23)  levothyroxine 112 mcg (0.112 mg) oral tablet: 1 tab(s) orally once a day (27 Oct 2020 13:23)  lisinopril 2.5 mg oral tablet: 0.5 tab(s) orally once a day (27 Oct 2020 13:23)  Melatonin 3 mg oral tablet: 2 tab(s) orally once (at bedtime) (27 Oct 2020 13:23)  Metoprolol Tartrate 25 mg oral tablet: 1 tab(s) orally 2 times a day (27 Oct 2020 13:23)  pravastatin 20 mg oral tablet: 1 tab(s) orally once a day (at bedtime) (27 Oct 2020 13:23)  warfarin 2 mg oral tablet: 1 tab(s) orally once a day (27 Oct 2020 13:23)    MEDICATIONS  (STANDING):  aspirin  chewable 81 milliGRAM(s) Oral daily  atorvastatin 20 milliGRAM(s) Oral at bedtime  diltiazem    milliGRAM(s) Oral daily  furosemide   Injectable 20 milliGRAM(s) IV Push three times a day  levothyroxine 112 MICROGram(s) Oral daily  melatonin 5 milliGRAM(s) Oral at bedtime  metoprolol tartrate 25 milliGRAM(s) Oral two times a day    MEDICATIONS  (PRN):  acetaminophen   Tablet .. 650 milliGRAM(s) Oral every 4 hours PRN Mild Pain (1 - 3)  aluminum hydroxide/magnesium hydroxide/simethicone Suspension 30 milliLiter(s) Oral every 4 hours PRN Dyspepsia  ondansetron Injectable 4 milliGRAM(s) IV Push every 6 hours PRN Nausea    Vital Signs Last 24 Hrs  T(C): 36.4 (28 Oct 2020 08:10), Max: 36.4 (27 Oct 2020 15:01)  T(F): 97.5 (28 Oct 2020 08:10), Max: 97.5 (27 Oct 2020 15:01)  HR: 57 (28 Oct 2020 08:10) (56 - 80)  BP: 121/72 (28 Oct 2020 08:10) (98/74 - 121/72)  BP(mean): 76 (27 Oct 2020 15:01) (76 - 76)  RR: 16 (28 Oct 2020 08:10) (16 - 21)  SpO2: 100% (28 Oct 2020 08:10) (95% - 100%)  I&O's Summary    27 Oct 2020 07:01  -  28 Oct 2020 07:00  --------------------------------------------------------  IN: 100 mL / OUT: 120 mL / NET: -20 mL      ________________________________  GENERAL APPEARANCE:  No acute distress  HEAD: normocephalic, atraumatic  NECK: supple, + jugular venous distention, no carotid bruit    HEART: Irregularly irregular, S1, S2 normal, 2/6 regurgitant murmur    CHEST:  No anterior chest wall tenderness    LUNGS: Coarse with decreased breath sounds at bases    ABDOMEN: soft, nontender, nondistended, with positive bowel sounds appreciated  EXTREMITIES: No clubbing bilateral extremity edema  NEURO: Alert and oriented x3  PSYC:  Normal affect  SKIN:  Dry  ________________________________   TELEMETRY: Rate controlled A. fib    ECG: Per my interpretation, rate controlled atrial fibrillation with nonspecific changes.    LABS:           10-29    138  |  102  |  20  ----------------------------<  91  4.4   |  31  |  0.72    Ca    8.8      29 Oct 2020 11:10    TPro  6.7  /  Alb  2.7<L>  /  TBili  0.6  /  DBili  x   /  AST  20  /  ALT  18  /  AlkPhos  61  10-29    CARDIAC MARKERS ( 27 Oct 2020 18:06 )  <0.015 ng/mL / x     / x     / x     / x      CARDIAC MARKERS ( 27 Oct 2020 14:52 )  <0.015 ng/mL / x     / x     / x     / x          PT/INR - ( 29 Oct 2020 09:45 )   PT: 42.7 sec;   INR: 3.93 ratio                 ________________________________    RADIOLOGY & ADDITIONAL STUDIES:IMPRESSION:  Moderate bilateral pleural effusions, right side greater than left.  Upper abdominal ascites.    ________________________________    ASSESSMENT:    Acute on chronic diastolic heart failure  Pleural Effusion on CT-similar to prior presentation  Chronic atrial fibrillation on anticoagulation  Supratherapeutic INR  Chronic venous insufficiency  Hypertension  Hyperlipidemia  Pulmonary hypertension valvular heart disease with aortic stenosis (aortic valve area of 1.03) and moderate mitral valve regurgitation        PLAN:  In summary, this is a 9-year-old female with a past medical history of valvular heart disease, chronic diastolic heart failure and atrial fibrillation admitted for acute diastolic heart failure.  I will continue diuretics as ordered.  Repeat chest x-ray in the morning.  Adjust levothyroxine.  Hold warfarin.  Continue diltiazem.  Continue metoprolol.  Hold ACE inhibitor.       DVT and GI prophylaxis  __________________________________________________________________________  Thank you for allowing me to participate in the care of your patient. Please contact me should any questions arise.    ALFONZO Ayala DO, [facility]  [phone]

## 2020-10-29 NOTE — CDI QUERY NOTE - NSCDIOTHERTXTBX_GEN_ALL_CORE_HH
This patient was admitted with acute on chronic diastolic heart failure. This patient was complaining of SOB and was placed on BIPAP and Nasal cannula:    ED Provider Note 10-27-20 @ 11:51  +SOB   Presenting Symptoms: SHORTNESS OF BREATH   RESPIRATORY: +Decreased breath sounds bilaterally- left>right; no wheezing;    RR rate and SpO2 on admission:  RR: 19-24  SpO2: 96-98% placed on 4L NC.    RR rate and SpO2 during visit:  RR: 16-19 with oxygen.  SpO2:  on 3-4L NC and BIPAP.    Is the above clinical criteria indicative of a further diagnosis?  A) Acute respiratory failure.  B) No clinical indication of respiratory failure.  C) Other, please specify:

## 2020-10-29 NOTE — GOALS OF CARE CONVERSATION - ADVANCED CARE PLANNING - CONVERSATION DETAILS
Patient discussed with MD Long this AM and I confirmed with patient at bedside. She would like full resuscitation in the event of a cardiac/respiratory event. MOLST filled out indicating full code.

## 2020-10-29 NOTE — PHYSICAL THERAPY INITIAL EVALUATION ADULT - GENERAL OBSERVATIONS, REHAB EVAL
Pre and post session: supine in bed with Monitor in place. Call bell and phone in reach. Bed alarm on. No c/o pain or SOB. O2 via NC. Tolerated well and would benefit from further skilled PT for functional mobility training.

## 2020-10-29 NOTE — PROGRESS NOTE ADULT - ASSESSMENT
1) ADHF  2) Dyspnea  3) Bilateral Effusions  4) Abnormal CT Chest     89 yo F with pmh afib on coumadin, HTN, Hyperlipidemia, hypothyroidism, h/o HFpEF, h/o pleural effusions treated with diuresis now presenting with complaints of sob and weakness. She was found to have a left sided pleural effusion on an o/p CXR. Endorses that her lasix was temporarily held as it was initially thought her ssx were related to dehydration. She has since been restarted on lasix 40 as opposed to lasix 20 po qd and spironolactone was also added.   ED was found to have mod to large left sided pleural effusion. Was not found to be hypoxic on admission. +B/L LE edema  Otherwise denies cp. No N/V/D. No hemoptysis. TNI 0.07. No ekg changes. Na 129. INR 4.9 with no e/o bleeding   Reviewed CT Chest  Agree with HF management  INR is supratherapeutic >5  Will likely need right sided thoracentesis once INR <2 if pleural effusions do not improve  Discussed with cardiology

## 2020-10-29 NOTE — PHYSICAL THERAPY INITIAL EVALUATION ADULT - PERTINENT HX OF CURRENT PROBLEM, REHAB EVAL
h/o pleural effusions treated with diuresis now presenting with complaints of sob and weakness. She was found to have a left sided pleural effusion on an o/p CXR. Endorses that her lasix was temporarily held as it was initially thought her ssx were related to dehydration. She has since been restarted on lasix 40 as opposed to lasix 20 po qd and spironolactone was also added.

## 2020-10-29 NOTE — PROGRESS NOTE ADULT - SUBJECTIVE AND OBJECTIVE BOX
Patient is a 89y old  Female who presents with a chief complaint of sob/weakness (27 Oct 2020 13:02)      HPI:      89 yo F with pmh afib on coumadin, HTN, Hyperlipidemia, hypothyroidism, h/o HFpEF, h/o pleural effusions treated with diuresis now presenting with complaints of sob and weakness. She was found to have a left sided pleural effusion on an o/p CXR. Endorses that her lasix was temporarily held as it was initially thought her ssx were related to dehydration. She has since been restarted on lasix 40 as opposed to lasix 20 po qd and spironolactone was also added.   ED was found to have mod to large left sided pleural effusion. Was not found to be hypoxic on admission. +B/L LE edema  Otherwise denies cp. No N/V/D. No hemoptysis. TNI 0.07. No ekg changes. Na 129. INR 4.9 with no e/o bleeding     10/29  No acute pulmonary events occurred overnight  Discussed plan with sonPablo        PREVIOUS DIAGNOSTIC TESTING:      MEDICATIONS  (STANDING):  aspirin  chewable 81 milliGRAM(s) Oral daily  atorvastatin 20 milliGRAM(s) Oral at bedtime  diltiazem    milliGRAM(s) Oral daily  furosemide   Injectable 40 milliGRAM(s) IV Push daily  levothyroxine 112 MICROGram(s) Oral daily  melatonin 5 milliGRAM(s) Oral at bedtime  metoprolol tartrate 25 milliGRAM(s) Oral two times a day    MEDICATIONS  (PRN):  acetaminophen   Tablet .. 650 milliGRAM(s) Oral every 4 hours PRN Mild Pain (1 - 3)  aluminum hydroxide/magnesium hydroxide/simethicone Suspension 30 milliLiter(s) Oral every 4 hours PRN Dyspepsia  ondansetron Injectable 4 milliGRAM(s) IV Push every 6 hours PRN Nausea      FAMILY HISTORY:      I&O's Summary    27 Oct 2020 07:01  -  28 Oct 2020 07:00  --------------------------------------------------------  IN: 100 mL / OUT: 120 mL / NET: -20 mL      PHYSICAL EXAM  General Appearance: cooperative, no acute distress,   HEENT: PERRL, conjunctiva clear, EOM's intact, non injected pharynx, no exudate, TM   normal  Neck: Supple, , no adenopathy, thyroid: not enlarged, no carotid bruit or JVD  Back: Symmetric, no  tenderness,no soft tissue tenderness  Lungs: diminished bilaterally  Heart: Regular rate and rhythm, S1, S2 normal, no murmur, rub or gallop  Abdomen: Soft, non-tender, bowel sounds active , no hepatosplenomegaly  Extremities: +GAVINO  Skin: Skin color, texture normal, no rashes   Neurologic: Alert and oriented X3 , cranial nerves intact, sensory and motor normal,    ECG:    LABS:                          13.4   8.34  )-----------( 167      ( 27 Oct 2020 11:11 )             40.4     10-28    136  |  101  |  17  ----------------------------<  94  4.3   |  33<H>  |  0.77    Ca    8.9      28 Oct 2020 07:47  Mg     2.0     10-27    TPro  7.2  /  Alb  3.0<L>  /  TBili  1.1  /  DBili  x   /  AST  30  /  ALT  19  /  AlkPhos  62  10-27    CARDIAC MARKERS ( 27 Oct 2020 18:06 )  <0.015 ng/mL / x     / x     / x     / x      CARDIAC MARKERS ( 27 Oct 2020 14:52 )  <0.015 ng/mL / x     / x     / x     / x            Pro BNP  9443 10-27 @ 11:11  D Dimer  -- 10-27 @ 11:11    PT/INR - ( 28 Oct 2020 07:47 )   PT: 54.3 sec;   INR: 5.01 ratio         PTT - ( 27 Oct 2020 11:11 )  PTT:40.6 sec          RADIOLOGY & ADDITIONAL STUDIES:

## 2020-10-29 NOTE — PROGRESS NOTE ADULT - SUBJECTIVE AND OBJECTIVE BOX
seen and examined at bedside     no interval events     feels better   no chest pain   no le edema today     ate 1/2 sandwich today     ros all others are neg     ICU Vital Signs Last 24 Hrs  T(C): 36.2 (29 Oct 2020 07:38), Max: 36.6 (29 Oct 2020 06:53)  T(F): 97.2 (29 Oct 2020 07:38), Max: 97.8 (29 Oct 2020 06:53)  HR: 64 (29 Oct 2020 07:38) (55 - 70)  BP: 101/51 (29 Oct 2020 07:38) (101/51 - 113/61)  RR: 16 (29 Oct 2020 07:38) (16 - 18)  SpO2: 96% (29 Oct 2020 07:38) (96% - 98%)    10-29    138  |  102  |  20  ----------------------------<  91  4.4   |  31  |  0.72    Ca    8.8      29 Oct 2020 11:10    TPro  6.7  /  Alb  2.7<L>  /  TBili  0.6  /  DBili  x   /  AST  20  /  ALT  18  /  AlkPhos  61  10-29    Home Medications:  Centrum Silver oral tablet: 1 tab(s) orally once a day (27 Oct 2020 13:23)  furosemide 20 mg oral tablet: 2 tab(s) orally 2 times a day (27 Oct 2020 13:23)  levothyroxine 112 mcg (0.112 mg) oral tablet: 1 tab(s) orally once a day (27 Oct 2020 13:23)  lisinopril 2.5 mg oral tablet: 0.5 tab(s) orally once a day (27 Oct 2020 13:23)  Melatonin 3 mg oral tablet: 2 tab(s) orally once (at bedtime) (27 Oct 2020 13:23)  Metoprolol Tartrate 25 mg oral tablet: 1 tab(s) orally 2 times a day (27 Oct 2020 13:23)  pravastatin 20 mg oral tablet: 1 tab(s) orally once a day (at bedtime) (27 Oct 2020 13:23)  warfarin 2 mg oral tablet: 1 tab(s) orally once a day (27 Oct 2020 13:23)    MEDICATIONS  (STANDING):  aspirin  chewable 81 milliGRAM(s) Oral daily  atorvastatin 20 milliGRAM(s) Oral at bedtime  diltiazem    milliGRAM(s) Oral daily  furosemide   Injectable 20 milliGRAM(s) IV Push three times a day  levothyroxine 100 MICROGram(s) Oral daily  melatonin 5 milliGRAM(s) Oral at bedtime  metoprolol tartrate 25 milliGRAM(s) Oral two times a day    MEDICATIONS  (PRN):  acetaminophen   Tablet .. 650 milliGRAM(s) Oral every 4 hours PRN Mild Pain (1 - 3)  aluminum hydroxide/magnesium hydroxide/simethicone Suspension 30 milliLiter(s) Oral every 4 hours PRN Dyspepsia  ondansetron Injectable 4 milliGRAM(s) IV Push every 6 hours PRN Nausea    10-29    138  |  102  |  20  ----------------------------<  91  4.4   |  31  |  0.72    Ca    8.8      29 Oct 2020 11:10    TPro  6.7  /  Alb  2.7<L>  /  TBili  0.6  /  DBili  x   /  AST  20  /  ALT  18  /  AlkPhos  61  10-29

## 2020-10-29 NOTE — PHYSICAL THERAPY INITIAL EVALUATION ADULT - LEVEL OF INDEPENDENCE: SIT/STAND, REHAB EVAL
feet needed some assist to stay flat on ground when attempting to stand./contact guard/minimum assist (75% patients effort)

## 2020-10-29 NOTE — PROGRESS NOTE ADULT - ASSESSMENT
Assessment and Plan:    89 yo F with pmh afib on coumadin, essential HTN, Hyperlipidemia, hypothyroidism, h/o HFpEF, h/o pleural effusions treated with diuresis now presenting with complaints of sob and weakness, admitted for acute on chronic diastolic HF     # Acute on chronic decompensated HFpEF, improving, continue the current lasix iv dosing, cardiac team assist appreciated   monitor, io uo and regular weight checks.   monitor electrolytes  PT OT   Fll risk precautions     #Afib on coumadin  - INR supratherapeutic (no bleeding clinically)  hold, and monitor INR trend  rate control continue current regimen     Advance directives/GOC/Code status: Full Code reviewed with patient, MOLST completed   Reviewed with patient, nursing plan of care   VTE proph on coumadin     EST DC 2 days

## 2020-10-29 NOTE — PHYSICAL THERAPY INITIAL EVALUATION ADULT - DIAGNOSIS, PT EVAL
Moderate sized L pleural effusion:Acute on chronic decompensated HFpEF: Positive trops in setting of chf

## 2020-10-30 LAB
ANION GAP SERPL CALC-SCNC: 6 MMOL/L — SIGNIFICANT CHANGE UP (ref 5–17)
APTT BLD: 37.4 SEC — HIGH (ref 27.5–35.5)
BUN SERPL-MCNC: 21 MG/DL — SIGNIFICANT CHANGE UP (ref 7–23)
CALCIUM SERPL-MCNC: 9.2 MG/DL — SIGNIFICANT CHANGE UP (ref 8.5–10.1)
CHLORIDE SERPL-SCNC: 102 MMOL/L — SIGNIFICANT CHANGE UP (ref 96–108)
CO2 SERPL-SCNC: 30 MMOL/L — SIGNIFICANT CHANGE UP (ref 22–31)
CREAT SERPL-MCNC: 0.87 MG/DL — SIGNIFICANT CHANGE UP (ref 0.5–1.3)
GLUCOSE SERPL-MCNC: 103 MG/DL — HIGH (ref 70–99)
HCT VFR BLD CALC: 40.5 % — SIGNIFICANT CHANGE UP (ref 34.5–45)
HGB BLD-MCNC: 13 G/DL — SIGNIFICANT CHANGE UP (ref 11.5–15.5)
INR BLD: 2.75 RATIO — HIGH (ref 0.88–1.16)
MCHC RBC-ENTMCNC: 29.5 PG — SIGNIFICANT CHANGE UP (ref 27–34)
MCHC RBC-ENTMCNC: 32.1 GM/DL — SIGNIFICANT CHANGE UP (ref 32–36)
MCV RBC AUTO: 92 FL — SIGNIFICANT CHANGE UP (ref 80–100)
PLATELET # BLD AUTO: 241 K/UL — SIGNIFICANT CHANGE UP (ref 150–400)
POTASSIUM SERPL-MCNC: 4 MMOL/L — SIGNIFICANT CHANGE UP (ref 3.5–5.3)
POTASSIUM SERPL-SCNC: 4 MMOL/L — SIGNIFICANT CHANGE UP (ref 3.5–5.3)
PROTHROM AB SERPL-ACNC: 30.7 SEC — HIGH (ref 10.6–13.6)
RBC # BLD: 4.4 M/UL — SIGNIFICANT CHANGE UP (ref 3.8–5.2)
RBC # FLD: 12.9 % — SIGNIFICANT CHANGE UP (ref 10.3–14.5)
SODIUM SERPL-SCNC: 138 MMOL/L — SIGNIFICANT CHANGE UP (ref 135–145)
WBC # BLD: 8.23 K/UL — SIGNIFICANT CHANGE UP (ref 3.8–10.5)
WBC # FLD AUTO: 8.23 K/UL — SIGNIFICANT CHANGE UP (ref 3.8–10.5)

## 2020-10-30 PROCEDURE — 99233 SBSQ HOSP IP/OBS HIGH 50: CPT

## 2020-10-30 PROCEDURE — 71045 X-RAY EXAM CHEST 1 VIEW: CPT | Mod: 26

## 2020-10-30 RX ORDER — FUROSEMIDE 40 MG
40 TABLET ORAL
Refills: 0 | Status: DISCONTINUED | OUTPATIENT
Start: 2020-10-30 | End: 2020-10-31

## 2020-10-30 RX ADMIN — Medication 5 MILLIGRAM(S): at 21:35

## 2020-10-30 RX ADMIN — Medication 25 MILLIGRAM(S): at 21:35

## 2020-10-30 RX ADMIN — Medication 180 MILLIGRAM(S): at 11:27

## 2020-10-30 RX ADMIN — Medication 40 MILLIGRAM(S): at 11:27

## 2020-10-30 RX ADMIN — Medication 100 MICROGRAM(S): at 06:47

## 2020-10-30 RX ADMIN — Medication 20 MILLIGRAM(S): at 06:19

## 2020-10-30 RX ADMIN — Medication 40 MILLIGRAM(S): at 21:35

## 2020-10-30 RX ADMIN — ATORVASTATIN CALCIUM 20 MILLIGRAM(S): 80 TABLET, FILM COATED ORAL at 21:34

## 2020-10-30 RX ADMIN — Medication 25 MILLIGRAM(S): at 11:28

## 2020-10-30 RX ADMIN — Medication 81 MILLIGRAM(S): at 11:26

## 2020-10-30 NOTE — PROGRESS NOTE ADULT - SUBJECTIVE AND OBJECTIVE BOX
Patient is a 89y old  Female who presents with a chief complaint of sob/weakness (27 Oct 2020 13:02)      HPI:      89 yo F with pmh afib on coumadin, HTN, Hyperlipidemia, hypothyroidism, h/o HFpEF, h/o pleural effusions treated with diuresis now presenting with complaints of sob and weakness. She was found to have a left sided pleural effusion on an o/p CXR. Endorses that her lasix was temporarily held as it was initially thought her ssx were related to dehydration. She has since been restarted on lasix 40 as opposed to lasix 20 po qd and spironolactone was also added.   ED was found to have mod to large left sided pleural effusion. Was not found to be hypoxic on admission. +B/L LE edema  Otherwise denies cp. No N/V/D. No hemoptysis. TNI 0.07. No ekg changes. Na 129. INR 4.9 with no e/o bleeding     10/29  No acute pulmonary events occurred overnight  Discussed plan with son, Pablo    10/30  No acute pulmonary events occurred overnight      PREVIOUS DIAGNOSTIC TESTING:      MEDICATIONS  (STANDING):  aspirin  chewable 81 milliGRAM(s) Oral daily  atorvastatin 20 milliGRAM(s) Oral at bedtime  diltiazem    milliGRAM(s) Oral daily  furosemide   Injectable 40 milliGRAM(s) IV Push daily  levothyroxine 112 MICROGram(s) Oral daily  melatonin 5 milliGRAM(s) Oral at bedtime  metoprolol tartrate 25 milliGRAM(s) Oral two times a day    MEDICATIONS  (PRN):  acetaminophen   Tablet .. 650 milliGRAM(s) Oral every 4 hours PRN Mild Pain (1 - 3)  aluminum hydroxide/magnesium hydroxide/simethicone Suspension 30 milliLiter(s) Oral every 4 hours PRN Dyspepsia  ondansetron Injectable 4 milliGRAM(s) IV Push every 6 hours PRN Nausea      FAMILY HISTORY:      I&O's Summary    27 Oct 2020 07:01  -  28 Oct 2020 07:00  --------------------------------------------------------  IN: 100 mL / OUT: 120 mL / NET: -20 mL      PHYSICAL EXAM  General Appearance: cooperative, no acute distress,   HEENT: PERRL, conjunctiva clear, EOM's intact, non injected pharynx, no exudate, TM   normal  Neck: Supple, , no adenopathy, thyroid: not enlarged, no carotid bruit or JVD  Back: Symmetric, no  tenderness,no soft tissue tenderness  Lungs: diminished bilaterally  Heart: Regular rate and rhythm, S1, S2 normal, no murmur, rub or gallop  Abdomen: Soft, non-tender, bowel sounds active , no hepatosplenomegaly  Extremities: +GAVINO  Skin: Skin color, texture normal, no rashes   Neurologic: Alert and oriented X3 , cranial nerves intact, sensory and motor normal,    ECG:    LABS:                          13.4   8.34  )-----------( 167      ( 27 Oct 2020 11:11 )             40.4     10-28    136  |  101  |  17  ----------------------------<  94  4.3   |  33<H>  |  0.77    Ca    8.9      28 Oct 2020 07:47  Mg     2.0     10-27    TPro  7.2  /  Alb  3.0<L>  /  TBili  1.1  /  DBili  x   /  AST  30  /  ALT  19  /  AlkPhos  62  10-27    CARDIAC MARKERS ( 27 Oct 2020 18:06 )  <0.015 ng/mL / x     / x     / x     / x      CARDIAC MARKERS ( 27 Oct 2020 14:52 )  <0.015 ng/mL / x     / x     / x     / x            Pro BNP  9443 10-27 @ 11:11  D Dimer  -- 10-27 @ 11:11    PT/INR - ( 28 Oct 2020 07:47 )   PT: 54.3 sec;   INR: 5.01 ratio         PTT - ( 27 Oct 2020 11:11 )  PTT:40.6 sec          RADIOLOGY & ADDITIONAL STUDIES:

## 2020-10-30 NOTE — PROGRESS NOTE ADULT - ASSESSMENT
Assessment and Plan:    89 yo F with pmh afib on coumadin, essential HTN, Hyperlipidemia, hypothyroidism, h/o HFpEF, h/o pleural effusions treated with diuresis now presenting with complaints of sob and weakness, admitted for acute on chronic diastolic HF     # Acute on chronic decompensated HFpEF, improving, continue the current lasix iv dosing, cardiac team assist appreciated   monitor, io uo and regular weight checks.   monitor electrolytes  PT OT   Fall risk precautions   Frail   Elderly     #Afib on coumadin  - INR supratherapeutic (no bleeding clinically)  hold, and monitor INR trend, possible plan for thoracentesis   rate control continue current regimen     Advance directives/GOC/Code status: Full Code reviewed with patient, MOLST completed   Reviewed with patient, nursing plan of care   VTE proph on coumadin     EST DC 2 days

## 2020-10-30 NOTE — PROGRESS NOTE ADULT - SUBJECTIVE AND OBJECTIVE BOX
seen and examined at bedside     no co     ros all others are neg   no chest pain   no sob     ICU Vital Signs Last 24 Hrs  T(C): 36.3 (30 Oct 2020 09:26), Max: 36.6 (29 Oct 2020 20:13)  T(F): 97.4 (30 Oct 2020 09:26), Max: 97.8 (29 Oct 2020 20:13)  HR: 71 (30 Oct 2020 09:26) (56 - 80)  BP: 106/49 (30 Oct 2020 09:26) (102/51 - 115/61)  RR: 18 (30 Oct 2020 09:26) (17 - 18)  SpO2: 100% (30 Oct 2020 09:26) (96% - 100%)                          13.0   8.23  )-----------( 241      ( 30 Oct 2020 07:26 )             40.5   10-30    138  |  102  |  21  ----------------------------<  103<H>  4.0   |  30  |  0.87    Ca    9.2      30 Oct 2020 07:26    TPro  6.7  /  Alb  2.7<L>  /  TBili  0.6  /  DBili  x   /  AST  20  /  ALT  18  /  AlkPhos  61  10-29    Home Medications:  Centrum Silver oral tablet: 1 tab(s) orally once a day (27 Oct 2020 13:23)  furosemide 20 mg oral tablet: 2 tab(s) orally 2 times a day (27 Oct 2020 13:23)  levothyroxine 112 mcg (0.112 mg) oral tablet: 1 tab(s) orally once a day (27 Oct 2020 13:23)  lisinopril 2.5 mg oral tablet: 0.5 tab(s) orally once a day (27 Oct 2020 13:23)  Melatonin 3 mg oral tablet: 2 tab(s) orally once (at bedtime) (27 Oct 2020 13:23)  Metoprolol Tartrate 25 mg oral tablet: 1 tab(s) orally 2 times a day (27 Oct 2020 13:23)  pravastatin 20 mg oral tablet: 1 tab(s) orally once a day (at bedtime) (27 Oct 2020 13:23)  warfarin 2 mg oral tablet: 1 tab(s) orally once a day (27 Oct 2020 13:23)    MEDICATIONS  (STANDING):  aspirin  chewable 81 milliGRAM(s) Oral daily  atorvastatin 20 milliGRAM(s) Oral at bedtime  diltiazem    milliGRAM(s) Oral daily  furosemide   Injectable 40 milliGRAM(s) IV Push two times a day  levothyroxine 100 MICROGram(s) Oral daily  melatonin 5 milliGRAM(s) Oral at bedtime  metoprolol tartrate 25 milliGRAM(s) Oral two times a day    MEDICATIONS  (PRN):  acetaminophen   Tablet .. 650 milliGRAM(s) Oral every 4 hours PRN Mild Pain (1 - 3)  aluminum hydroxide/magnesium hydroxide/simethicone Suspension 30 milliLiter(s) Oral every 4 hours PRN Dyspepsia  ondansetron Injectable 4 milliGRAM(s) IV Push every 6 hours PRN Nausea

## 2020-10-30 NOTE — PROGRESS NOTE ADULT - SUBJECTIVE AND OBJECTIVE BOX
The patient was seen and examined. No acute events overnight.  No chest pain.  Improved shortness of breath, with improved but not resolved effusion repeat chest x-ray this morning.        _____________________________  ELIZABETH LACY is a 89y year old Female with a past medical history of chronic diastolic heart failure, chronic atrial fibrillation on anticoagulation with warfarin, hypertension, hyperlipidemia, moderate mitral valve regurgitation, moderate stenosis, with severe pulmonary hypertension who was hospitalized last year for heart failure and responded to diuretics.  The patient was seen in the office on 26, with complaints of shortness of breath, and advised to go to the ER given her symptoms of shortness of breath and pleural effusions on chest x-ray.  She declined.     She continued to feel short of breath and subsequently was sent to the ER.  She was admitted for acute diastolic heart failure.  Currently she is stable on oxygen without chest pain, or palpitations.  No dizziness or lightheadedness.    PREVIOUS CARDIAC WORKUP:    Echocardiogram: October 26, 2020-  --There is severe left atrial dilatation (LA volume index 65 ml/m²).  --There is mild left ventricular hypertrophy.  --LV global wall motion is hypokinetic.  --LV ejection fraction (50 %) is borderline normal.  --Left ventricular ejection fraction 50 to 55%. There is asymmetric apical hypertrophy present.  --There is severe right atrial dilatation.  --The aortic root is normal in size (3.40 cm). Ascending aorta is dilated; its diameter is 3.90   cm (2.6 cm/m²).  --The aortic valve is moderately calcified. There is moderate aortic stenosis. The aortic valve   area, by the continuity equation, is 1.03 cm². There is mild aortic regurgitation.  --There is mild to moderate mitral regurgitation.  --There is moderate to severe tricuspid regurgitation.  --There is mild pulmonic regurgitation.  --The right atrial pressure is elevated (11-20 mm Hg). There is severe pulmonary hypertension.  --There is a trace pericardial effusion. Left pleural effusion is present. Right pleural   effusion is present.     ________________________________  Review of systems: A 10 point review of system has been performed, and is negative except for what has been mentioned in the above history of present illness.     PAST MEDICAL & SURGICAL HISTORY:  Hypothyroid    Hyperlipidemia    HTN (hypertension)    AF (atrial fibrillation)         Home Medications:  Centrum Silver oral tablet: 1 tab(s) orally once a day (27 Oct 2020 13:23)  furosemide 20 mg oral tablet: 2 tab(s) orally 2 times a day (27 Oct 2020 13:23)  levothyroxine 112 mcg (0.112 mg) oral tablet: 1 tab(s) orally once a day (27 Oct 2020 13:23)  lisinopril 2.5 mg oral tablet: 0.5 tab(s) orally once a day (27 Oct 2020 13:23)  Melatonin 3 mg oral tablet: 2 tab(s) orally once (at bedtime) (27 Oct 2020 13:23)  Metoprolol Tartrate 25 mg oral tablet: 1 tab(s) orally 2 times a day (27 Oct 2020 13:23)  pravastatin 20 mg oral tablet: 1 tab(s) orally once a day (at bedtime) (27 Oct 2020 13:23)  warfarin 2 mg oral tablet: 1 tab(s) orally once a day (27 Oct 2020 13:23)    MEDICATIONS  (STANDING):  aspirin  chewable 81 milliGRAM(s) Oral daily  atorvastatin 20 milliGRAM(s) Oral at bedtime  diltiazem    milliGRAM(s) Oral daily  furosemide   Injectable 40 milliGRAM(s) IV Push two times a day  levothyroxine 100 MICROGram(s) Oral daily  melatonin 5 milliGRAM(s) Oral at bedtime  metoprolol tartrate 25 milliGRAM(s) Oral two times a day    MEDICATIONS  (PRN):  acetaminophen   Tablet .. 650 milliGRAM(s) Oral every 4 hours PRN Mild Pain (1 - 3)  aluminum hydroxide/magnesium hydroxide/simethicone Suspension 30 milliLiter(s) Oral every 4 hours PRN Dyspepsia  ondansetron Injectable 4 milliGRAM(s) IV Push every 6 hours PRN Nausea      Vital Signs Last 24 Hrs  T(C): 36.3 (30 Oct 2020 09:26), Max: 36.6 (29 Oct 2020 20:13)  T(F): 97.4 (30 Oct 2020 09:26), Max: 97.8 (29 Oct 2020 20:13)  HR: 71 (30 Oct 2020 09:26) (56 - 80)  BP: 106/49 (30 Oct 2020 09:26) (102/51 - 115/61)  BP(mean): --  RR: 18 (30 Oct 2020 09:26) (17 - 18)  SpO2: 100% (30 Oct 2020 09:26) (96% - 100%)  I&O's Summary    ________________________________  GENERAL APPEARANCE:  No acute distress  HEAD: normocephalic, atraumatic  NECK: supple, + jugular venous distention, no carotid bruit    HEART: Irregularly irregular, S1, S2 normal, 2/6 regurgitant murmur    CHEST:  No anterior chest wall tenderness    LUNGS: Coarse with decreased breath sounds at bases    ABDOMEN: soft, nontender, nondistended, with positive bowel sounds appreciated  EXTREMITIES: No clubbing bilateral extremity edema  NEURO: Alert and oriented x3  PSYC:  Normal affect  SKIN:  Dry  ________________________________   TELEMETRY: Rate controlled A. fib    ECG: Per my interpretation, rate controlled atrial fibrillation with nonspecific changes.    LABS:       ________________________________    RADIOLOGY & ADDITIONAL STUDIES:IMPRESSION:  Moderate bilateral pleural effusions, right side greater than left.  Upper abdominal ascites.    ________________________________    ASSESSMENT:    Acute on chronic diastolic heart failure  Pleural Effusion on CT-similar to prior presentation  Chronic atrial fibrillation on anticoagulation  Supratherapeutic INR  Chronic venous insufficiency  Hypertension  Hyperlipidemia  Pulmonary hypertension valvular heart disease with aortic stenosis (aortic valve area of 1.03) and moderate mitral valve regurgitation        PLAN:  In summary, this is a 9-year-old female with a past medical history of valvular heart disease, chronic diastolic heart failure and atrial fibrillation admitted for acute diastolic heart failure.    Chest x-ray reviewed.    Continue with IV diuretics.  Resume warfarin.  Continue statin.  Continue diltiazem and metoprolol.  Hold ACE inhibitor as BP on low side.  DVT and GI prophylaxis  __________________________________________________________________________  Thank you for allowing me to participate in the care of your patient. Please contact me should any questions arise.    ALFONZO Ayala DO, [facility]

## 2020-10-30 NOTE — PROGRESS NOTE ADULT - ASSESSMENT
1) ADHF  2) Dyspnea  3) Bilateral Effusions  4) Abnormal CT Chest     89 yo F with pmh afib on coumadin, HTN, Hyperlipidemia, hypothyroidism, h/o HFpEF, h/o pleural effusions treated with diuresis now presenting with complaints of sob and weakness. She was found to have a left sided pleural effusion on an o/p CXR. Endorses that her lasix was temporarily held as it was initially thought her ssx were related to dehydration. She has since been restarted on lasix 40 as opposed to lasix 20 po qd and spironolactone was also added.   ED was found to have mod to large left sided pleural effusion. Was not found to be hypoxic on admission. +B/L LE edema  Otherwise denies cp. No N/V/D. No hemoptysis. TNI 0.07. No ekg changes. Na 129. INR 4.9 with no e/o bleeding   Reviewed CT Chest  Agree with HF management  INR is supratherapeutic >5--3.93-->2.75  Will likely need right sided thoracentesis once INR <2 if pleural effusions do not improve  Ordered new CXR  Discussed with cardiology; discussed with patient's son Pablo  Dr Hawk to cover 10/31-11/1

## 2020-10-31 LAB
ANION GAP SERPL CALC-SCNC: 4 MMOL/L — LOW (ref 5–17)
APTT BLD: 36.1 SEC — HIGH (ref 27.5–35.5)
BUN SERPL-MCNC: 21 MG/DL — SIGNIFICANT CHANGE UP (ref 7–23)
CALCIUM SERPL-MCNC: 9.2 MG/DL — SIGNIFICANT CHANGE UP (ref 8.5–10.1)
CHLORIDE SERPL-SCNC: 100 MMOL/L — SIGNIFICANT CHANGE UP (ref 96–108)
CO2 SERPL-SCNC: 34 MMOL/L — HIGH (ref 22–31)
CREAT SERPL-MCNC: 0.74 MG/DL — SIGNIFICANT CHANGE UP (ref 0.5–1.3)
GLUCOSE SERPL-MCNC: 91 MG/DL — SIGNIFICANT CHANGE UP (ref 70–99)
HCT VFR BLD CALC: 38.9 % — SIGNIFICANT CHANGE UP (ref 34.5–45)
HGB BLD-MCNC: 12.4 G/DL — SIGNIFICANT CHANGE UP (ref 11.5–15.5)
INR BLD: 2.32 RATIO — HIGH (ref 0.88–1.16)
MCHC RBC-ENTMCNC: 29.2 PG — SIGNIFICANT CHANGE UP (ref 27–34)
MCHC RBC-ENTMCNC: 31.9 GM/DL — LOW (ref 32–36)
MCV RBC AUTO: 91.7 FL — SIGNIFICANT CHANGE UP (ref 80–100)
PLATELET # BLD AUTO: 239 K/UL — SIGNIFICANT CHANGE UP (ref 150–400)
POTASSIUM SERPL-MCNC: 3.7 MMOL/L — SIGNIFICANT CHANGE UP (ref 3.5–5.3)
POTASSIUM SERPL-SCNC: 3.7 MMOL/L — SIGNIFICANT CHANGE UP (ref 3.5–5.3)
PROTHROM AB SERPL-ACNC: 25.9 SEC — HIGH (ref 10.6–13.6)
RBC # BLD: 4.24 M/UL — SIGNIFICANT CHANGE UP (ref 3.8–5.2)
RBC # FLD: 13.1 % — SIGNIFICANT CHANGE UP (ref 10.3–14.5)
SODIUM SERPL-SCNC: 138 MMOL/L — SIGNIFICANT CHANGE UP (ref 135–145)
WBC # BLD: 8.19 K/UL — SIGNIFICANT CHANGE UP (ref 3.8–10.5)
WBC # FLD AUTO: 8.19 K/UL — SIGNIFICANT CHANGE UP (ref 3.8–10.5)

## 2020-10-31 PROCEDURE — 99233 SBSQ HOSP IP/OBS HIGH 50: CPT

## 2020-10-31 RX ORDER — WARFARIN SODIUM 2.5 MG/1
2 TABLET ORAL ONCE
Refills: 0 | Status: COMPLETED | OUTPATIENT
Start: 2020-10-31 | End: 2020-10-31

## 2020-10-31 RX ORDER — METOPROLOL TARTRATE 50 MG
25 TABLET ORAL DAILY
Refills: 0 | Status: DISCONTINUED | OUTPATIENT
Start: 2020-10-31 | End: 2020-11-03

## 2020-10-31 RX ADMIN — Medication 100 MICROGRAM(S): at 06:02

## 2020-10-31 RX ADMIN — Medication 40 MILLIGRAM(S): at 11:11

## 2020-10-31 RX ADMIN — WARFARIN SODIUM 2 MILLIGRAM(S): 2.5 TABLET ORAL at 21:19

## 2020-10-31 RX ADMIN — Medication 81 MILLIGRAM(S): at 11:11

## 2020-10-31 RX ADMIN — ATORVASTATIN CALCIUM 20 MILLIGRAM(S): 80 TABLET, FILM COATED ORAL at 21:19

## 2020-10-31 RX ADMIN — Medication 5 MILLIGRAM(S): at 21:19

## 2020-10-31 NOTE — PROGRESS NOTE ADULT - SUBJECTIVE AND OBJECTIVE BOX
seen and examined at bedside     no co     ros all others are neg     no chest pain     ICU Vital Signs Last 24 Hrs  T(C): 36.6 (31 Oct 2020 08:16), Max: 36.6 (31 Oct 2020 08:16)  T(F): 97.9 (31 Oct 2020 08:16), Max: 97.9 (31 Oct 2020 08:16)  HR: 76 (31 Oct 2020 10:23) (50 - 76)  BP: 101/47 (31 Oct 2020 10:23) (99/56 - 116/53)  RR: 17 (31 Oct 2020 08:16) (16 - 17)  SpO2: 100% (31 Oct 2020 08:16) (100% - 100%)                          12.4   8.19  )-----------( 239      ( 31 Oct 2020 08:25 )             38.9   10-31    138  |  100  |  21  ----------------------------<  91  3.7   |  34<H>  |  0.74    Ca    9.2      31 Oct 2020 08:25    Home Medications:  Centrum Silver oral tablet: 1 tab(s) orally once a day (27 Oct 2020 13:23)  furosemide 20 mg oral tablet: 2 tab(s) orally 2 times a day (27 Oct 2020 13:23)  levothyroxine 112 mcg (0.112 mg) oral tablet: 1 tab(s) orally once a day (27 Oct 2020 13:23)  lisinopril 2.5 mg oral tablet: 0.5 tab(s) orally once a day (27 Oct 2020 13:23)  Melatonin 3 mg oral tablet: 2 tab(s) orally once (at bedtime) (27 Oct 2020 13:23)  Metoprolol Tartrate 25 mg oral tablet: 1 tab(s) orally 2 times a day (27 Oct 2020 13:23)  pravastatin 20 mg oral tablet: 1 tab(s) orally once a day (at bedtime) (27 Oct 2020 13:23)  warfarin 2 mg oral tablet: 1 tab(s) orally once a day (27 Oct 2020 13:23)    MEDICATIONS  (STANDING):  aspirin  chewable 81 milliGRAM(s) Oral daily  atorvastatin 20 milliGRAM(s) Oral at bedtime  levothyroxine 100 MICROGram(s) Oral daily  melatonin 5 milliGRAM(s) Oral at bedtime  metoprolol succinate ER 25 milliGRAM(s) Oral daily  warfarin 2 milliGRAM(s) Oral once    MEDICATIONS  (PRN):  acetaminophen   Tablet .. 650 milliGRAM(s) Oral every 4 hours PRN Mild Pain (1 - 3)  aluminum hydroxide/magnesium hydroxide/simethicone Suspension 30 milliLiter(s) Oral every 4 hours PRN Dyspepsia  ondansetron Injectable 4 milliGRAM(s) IV Push every 6 hours PRN Nausea

## 2020-10-31 NOTE — PROGRESS NOTE ADULT - ASSESSMENT
1) ADHF  2) Dyspnea  3) Bilateral Effusions  4) Abnormal CT Chest     89 yo F with pmh afib on coumadin, HTN, Hyperlipidemia, hypothyroidism, h/o HFpEF, h/o pleural effusions treated with diuresis now presenting with complaints of sob and weakness. She was found to have a left sided pleural effusion on an o/p CXR. Endorses that her lasix was temporarily held as it was initially thought her ssx were related to dehydration. She has since been restarted on lasix 40 as opposed to lasix 20 po qd and spironolactone was also added.   ED was found to have mod to large left sided pleural effusion. Was not found to be hypoxic on admission. +B/L LE edema  Otherwise denies cp. No N/V/D. No hemoptysis. TNI 0.07. No ekg changes. Na 129. INR 4.9 with no e/o bleeding   Reviewed CT Chest  Agree with HF management  INR is supratherapeutic >5--3.93-->2.75  Will likely need left sided thoracentesis once INR <2 if pleural effusions do not improve  cont with current therapy given pt's advanced age  Left>Right pleural effusion on repeat cxr reviewed    INTERPRETATION:  AP erect chest on October 30, 2020 at 8:34 AM. Clinically patient has CHF.    Heart enlargement, mild to moderate CHF in the lungs, and mild to moderate basilar effusions left greater than right again noted.    Chest is similar to October 27.    IMPRESSION: Persistent CHF.         1) ADHF  2) Dyspnea  3) Bilateral Effusions  4) Abnormal CT Chest     89 yo F with pmh afib on coumadin, HTN, Hyperlipidemia, hypothyroidism, h/o HFpEF, h/o pleural effusions treated with diuresis now presenting with complaints of sob and weakness. She was found to have a left sided pleural effusion on an o/p CXR. Endorses that her lasix was temporarily held as it was initially thought her ssx were related to dehydration. She has since been restarted on lasix 40 as opposed to lasix 20 po qd and spironolactone was also added.   ED was found to have mod to large left sided pleural effusion. Was not found to be hypoxic on admission. +B/L LE edema  Otherwise denies cp. No N/V/D. No hemoptysis. TNI 0.07. No ekg changes. Na 129. INR 4.9 with no e/o bleeding   Reviewed CT Chest  Agree with HF management  INR is supratherapeutic >5--3.93-->2.75  Will likely need right sided thoracentesis once INR <2 if pleural effusions do not improve  cont with current therapy given pt's advanced age  Left>Right pleural effusion on repeat cxr reviewed  Right > left sided effusion on recent ct scan chest reviewed    INTERPRETATION:  AP erect chest on October 30, 2020 at 8:34 AM. Clinically patient has CHF.    Heart enlargement, mild to moderate CHF in the lungs, and mild to moderate basilar effusions left greater than right again noted.    Chest is similar to October 27.    IMPRESSION: Persistent CHF.

## 2020-10-31 NOTE — PROGRESS NOTE ADULT - ASSESSMENT
Assessment and Plan:    89 yo F with pmh afib on coumadin, essential HTN, Hyperlipidemia, hypothyroidism, h/o HFpEF, h/o pleural effusions treated with diuresis now presenting with complaints of sob and weakness, admitted for acute on chronic diastolic HF     # Acute on chronic decompensated HFpEF resolving,   cardiac team assist appreciated   off lasix now   monitor bp  monitor, io uo and regular weight checks.   monitor electrolytes  PT OT   Fall risk precautions   Frail   Elderly     #Afib on coumadin  - INR supratherapeutic (no bleeding clinically)  hold, and monitor INR trend, possible plan for thoracentesis   rate control continue current regimen     Advance directives/GOC/Code status: Full Code reviewed with patient, MOLST completed   Reviewed with patient, nursing plan of care   VTE proph on coumadin     EST DC 2 days

## 2020-10-31 NOTE — PROGRESS NOTE ADULT - SUBJECTIVE AND OBJECTIVE BOX
Patient is a 89y old  Female who presents with a chief complaint of sob/weakness (27 Oct 2020 13:02)      HPI:      89 yo F with pmh afib on coumadin, HTN, Hyperlipidemia, hypothyroidism, h/o HFpEF, h/o pleural effusions treated with diuresis now presenting with complaints of sob and weakness. She was found to have a left sided pleural effusion on an o/p CXR. Endorses that her lasix was temporarily held as it was initially thought her ssx were related to dehydration. She has since been restarted on lasix 40 as opposed to lasix 20 po qd and spironolactone was also added.   ED was found to have mod to large left sided pleural effusion. Was not found to be hypoxic on admission. +B/L LE edema  Otherwise denies cp. No N/V/D. No hemoptysis. TNI 0.07. No ekg changes. Na 129. INR 4.9 with no e/o bleeding     10/29  No acute pulmonary events occurred overnight  Discussed plan with son, Pablo    10/30  No acute pulmonary events occurred overnight    10/31  uneventful overnight  no distress    PREVIOUS DIAGNOSTIC TESTING:      MEDICATIONS  (STANDING):  aspirin  chewable 81 milliGRAM(s) Oral daily  atorvastatin 20 milliGRAM(s) Oral at bedtime  diltiazem    milliGRAM(s) Oral daily  furosemide   Injectable 40 milliGRAM(s) IV Push daily  levothyroxine 112 MICROGram(s) Oral daily  melatonin 5 milliGRAM(s) Oral at bedtime  metoprolol tartrate 25 milliGRAM(s) Oral two times a day    MEDICATIONS  (PRN):  acetaminophen   Tablet .. 650 milliGRAM(s) Oral every 4 hours PRN Mild Pain (1 - 3)  aluminum hydroxide/magnesium hydroxide/simethicone Suspension 30 milliLiter(s) Oral every 4 hours PRN Dyspepsia  ondansetron Injectable 4 milliGRAM(s) IV Push every 6 hours PRN Nausea      FAMILY HISTORY:    Vital Signs Last 24 Hrs  T(C): 36.6 (31 Oct 2020 08:16), Max: 36.6 (31 Oct 2020 08:16)  T(F): 97.9 (31 Oct 2020 08:16), Max: 97.9 (31 Oct 2020 08:16)  HR: 76 (31 Oct 2020 10:23) (50 - 76)  BP: 101/47 (31 Oct 2020 10:23) (99/56 - 116/53)  BP(mean): --  RR: 17 (31 Oct 2020 08:16) (16 - 17)  SpO2: 100% (31 Oct 2020 08:16) (100% - 100%)    PHYSICAL EXAM  General Appearance: cooperative, no acute distress,   HEENT: PERRL, conjunctiva clear, EOM's intact, non injected pharynx, no exudate, TM   normal  Neck: Supple, , no adenopathy, thyroid: not enlarged, no carotid bruit or JVD  Back: Symmetric, no  tenderness,no soft tissue tenderness  Lungs: diminished bilaterally  Heart: Regular rate and rhythm, S1, S2 normal, no murmur, rub or gallop  Abdomen: Soft, non-tender, bowel sounds active , no hepatosplenomegaly  Extremities: +GAVINO  Skin: Skin color, texture normal, no rashes   Neurologic: Alert and oriented X3 , cranial nerves intact, sensory and motor normal,    ECG:    LABS:                          13.4   8.34  )-----------( 167      ( 27 Oct 2020 11:11 )             40.4     10-28    136  |  101  |  17  ----------------------------<  94  4.3   |  33<H>  |  0.77    Ca    8.9      28 Oct 2020 07:47  Mg     2.0     10-27    TPro  7.2  /  Alb  3.0<L>  /  TBili  1.1  /  DBili  x   /  AST  30  /  ALT  19  /  AlkPhos  62  10-27    CARDIAC MARKERS ( 27 Oct 2020 18:06 )  <0.015 ng/mL / x     / x     / x     / x      CARDIAC MARKERS ( 27 Oct 2020 14:52 )  <0.015 ng/mL / x     / x     / x     / x            Pro BNP  9443 10-27 @ 11:11  D Dimer  -- 10-27 @ 11:11    PT/INR - ( 28 Oct 2020 07:47 )   PT: 54.3 sec;   INR: 5.01 ratio         PTT - ( 27 Oct 2020 11:11 )  PTT:40.6 sec          RADIOLOGY & ADDITIONAL STUDIES:

## 2020-10-31 NOTE — PROGRESS NOTE ADULT - SUBJECTIVE AND OBJECTIVE BOX
The patient was seen and examined. No acute events overnight.  No chest pain.  Improved shortness of breath, ambulating with walker.  No CP  HR stable  BP on low side      _____________________________  ELIZABETH LACY is a 89y year old Female with a past medical history of chronic diastolic heart failure, chronic atrial fibrillation on anticoagulation with warfarin, hypertension, hyperlipidemia, moderate mitral valve regurgitation, moderate stenosis, with severe pulmonary hypertension who was hospitalized last year for heart failure and responded to diuretics.  The patient was seen in the office on 26, with complaints of shortness of breath, and advised to go to the ER given her symptoms of shortness of breath and pleural effusions on chest x-ray.  She declined.     She continued to feel short of breath and subsequently was sent to the ER.  She was admitted for acute diastolic heart failure.  Currently she is stable on oxygen without chest pain, or palpitations.  No dizziness or lightheadedness.    PREVIOUS CARDIAC WORKUP:    Echocardiogram: October 26, 2020-  --There is severe left atrial dilatation (LA volume index 65 ml/m²).  --There is mild left ventricular hypertrophy.  --LV global wall motion is hypokinetic.  --LV ejection fraction (50 %) is borderline normal.  --Left ventricular ejection fraction 50 to 55%. There is asymmetric apical hypertrophy present.  --There is severe right atrial dilatation.  --The aortic root is normal in size (3.40 cm). Ascending aorta is dilated; its diameter is 3.90   cm (2.6 cm/m²).  --The aortic valve is moderately calcified. There is moderate aortic stenosis. The aortic valve   area, by the continuity equation, is 1.03 cm². There is mild aortic regurgitation.  --There is mild to moderate mitral regurgitation.  --There is moderate to severe tricuspid regurgitation.  --There is mild pulmonic regurgitation.  --The right atrial pressure is elevated (11-20 mm Hg). There is severe pulmonary hypertension.  --There is a trace pericardial effusion. Left pleural effusion is present. Right pleural   effusion is present.     ________________________________  Review of systems: A 10 point review of system has been performed, and is negative except for what has been mentioned in the above history of present illness.     PAST MEDICAL & SURGICAL HISTORY:  Hypothyroid    Hyperlipidemia    HTN (hypertension)    AF (atrial fibrillation)         Home Medications:  Centrum Silver oral tablet: 1 tab(s) orally once a day (27 Oct 2020 13:23)  furosemide 20 mg oral tablet: 2 tab(s) orally 2 times a day (27 Oct 2020 13:23)  levothyroxine 112 mcg (0.112 mg) oral tablet: 1 tab(s) orally once a day (27 Oct 2020 13:23)  lisinopril 2.5 mg oral tablet: 0.5 tab(s) orally once a day (27 Oct 2020 13:23)  Melatonin 3 mg oral tablet: 2 tab(s) orally once (at bedtime) (27 Oct 2020 13:23)  Metoprolol Tartrate 25 mg oral tablet: 1 tab(s) orally 2 times a day (27 Oct 2020 13:23)  pravastatin 20 mg oral tablet: 1 tab(s) orally once a day (at bedtime) (27 Oct 2020 13:23)  warfarin 2 mg oral tablet: 1 tab(s) orally once a day (27 Oct 2020 13:23)    MEDICATIONS  (STANDING):  aspirin  chewable 81 milliGRAM(s) Oral daily  atorvastatin 20 milliGRAM(s) Oral at bedtime  levothyroxine 100 MICROGram(s) Oral daily  melatonin 5 milliGRAM(s) Oral at bedtime  metoprolol succinate ER 25 milliGRAM(s) Oral daily  warfarin 2 milliGRAM(s) Oral once    MEDICATIONS  (PRN):  acetaminophen   Tablet .. 650 milliGRAM(s) Oral every 4 hours PRN Mild Pain (1 - 3)  aluminum hydroxide/magnesium hydroxide/simethicone Suspension 30 milliLiter(s) Oral every 4 hours PRN Dyspepsia  ondansetron Injectable 4 milliGRAM(s) IV Push every 6 hours PRN Nausea      Vital Signs Last 24 Hrs  T(C): 36.6 (31 Oct 2020 08:16), Max: 36.6 (31 Oct 2020 08:16)  T(F): 97.9 (31 Oct 2020 08:16), Max: 97.9 (31 Oct 2020 08:16)  HR: 76 (31 Oct 2020 10:23) (50 - 76)  BP: 101/47 (31 Oct 2020 10:23) (99/56 - 116/53)  BP(mean): --  RR: 17 (31 Oct 2020 08:16) (16 - 17)  SpO2: 100% (31 Oct 2020 08:16) (100% - 100%)  I&O's Summary    ________________________________  GENERAL APPEARANCE:  No acute distress  HEAD: normocephalic, atraumatic  NECK: supple, + jugular venous distention, no carotid bruit    HEART: Irregularly irregular, S1, S2 normal, 2/6 regurgitant murmur    CHEST:  No anterior chest wall tenderness    LUNGS: Coarse with decreased breath sounds at bases    ABDOMEN: soft, nontender, nondistended, with positive bowel sounds appreciated  EXTREMITIES: No clubbing bilateral extremity edema  NEURO: Alert and oriented x3  PSYC:  Normal affect  SKIN:  Dry  ________________________________   TELEMETRY: Rate controlled A. fib    ECG: Per my interpretation, rate controlled atrial fibrillation with nonspecific changes.    LABS:                        12.4   8.19  )-----------( 239      ( 31 Oct 2020 08:25 )             38.9          10-31    138  |  100  |  21  ----------------------------<  91  3.7   |  34<H>  |  0.74    Ca    9.2      31 Oct 2020 08:25          PT/INR - ( 31 Oct 2020 09:15 )   PT: 25.9 sec;   INR: 2.32 ratio         PTT - ( 31 Oct 2020 09:15 )  PTT:36.1 sec         ________________________________    RADIOLOGY & ADDITIONAL STUDIES:  IMPRESSION:  Moderate bilateral pleural effusions, right side greater than left.  Upper abdominal ascites.    ________________________________    ASSESSMENT:    Acute on chronic diastolic heart failure  Pleural Effusion on CT-similar to prior presentation  Chronic atrial fibrillation on anticoagulation  Supratherapeutic INR  Chronic venous insufficiency  Hypertension  Hyperlipidemia  Pulmonary hypertension valvular heart disease with aortic stenosis (aortic valve area of 1.03) and moderate mitral valve regurgitation        PLAN:  In summary, this is a 9-year-old female with a past medical history of valvular heart disease, chronic diastolic heart failure and atrial fibrillation admitted for acute diastolic heart failure.    Her shortness breath continues to improve.  She was able to ambulate with her walker without desaturating.  BP on low side.  Discontinue Cardizem.  Continue metoprolol.  Switch to oral diuretic.  Hold ACE.  Blood pressure on low side.  Continue with statin.  Warfarin resumed.  DVT and GI prophylaxis  Discharge planning per primary    __________________________________________________________________________  Thank you for allowing me to participate in the care of your patient. Please contact me should any questions arise.    ALFONZO Ayala DO, FACC  [phone]

## 2020-11-01 LAB
ANION GAP SERPL CALC-SCNC: 7 MMOL/L — SIGNIFICANT CHANGE UP (ref 5–17)
APTT BLD: 33.6 SEC — SIGNIFICANT CHANGE UP (ref 27.5–35.5)
BUN SERPL-MCNC: 17 MG/DL — SIGNIFICANT CHANGE UP (ref 7–23)
CALCIUM SERPL-MCNC: 8.9 MG/DL — SIGNIFICANT CHANGE UP (ref 8.5–10.1)
CHLORIDE SERPL-SCNC: 102 MMOL/L — SIGNIFICANT CHANGE UP (ref 96–108)
CO2 SERPL-SCNC: 29 MMOL/L — SIGNIFICANT CHANGE UP (ref 22–31)
CREAT SERPL-MCNC: 0.66 MG/DL — SIGNIFICANT CHANGE UP (ref 0.5–1.3)
GLUCOSE SERPL-MCNC: 85 MG/DL — SIGNIFICANT CHANGE UP (ref 70–99)
HCT VFR BLD CALC: 40.2 % — SIGNIFICANT CHANGE UP (ref 34.5–45)
HGB BLD-MCNC: 12.9 G/DL — SIGNIFICANT CHANGE UP (ref 11.5–15.5)
INR BLD: 1.78 RATIO — HIGH (ref 0.88–1.16)
MCHC RBC-ENTMCNC: 29.3 PG — SIGNIFICANT CHANGE UP (ref 27–34)
MCHC RBC-ENTMCNC: 32.1 GM/DL — SIGNIFICANT CHANGE UP (ref 32–36)
MCV RBC AUTO: 91.2 FL — SIGNIFICANT CHANGE UP (ref 80–100)
PLATELET # BLD AUTO: 223 K/UL — SIGNIFICANT CHANGE UP (ref 150–400)
POTASSIUM SERPL-MCNC: 3.8 MMOL/L — SIGNIFICANT CHANGE UP (ref 3.5–5.3)
POTASSIUM SERPL-SCNC: 3.8 MMOL/L — SIGNIFICANT CHANGE UP (ref 3.5–5.3)
PROTHROM AB SERPL-ACNC: 20.3 SEC — HIGH (ref 10.6–13.6)
RBC # BLD: 4.41 M/UL — SIGNIFICANT CHANGE UP (ref 3.8–5.2)
RBC # FLD: 12.9 % — SIGNIFICANT CHANGE UP (ref 10.3–14.5)
SODIUM SERPL-SCNC: 138 MMOL/L — SIGNIFICANT CHANGE UP (ref 135–145)
WBC # BLD: 7.93 K/UL — SIGNIFICANT CHANGE UP (ref 3.8–10.5)
WBC # FLD AUTO: 7.93 K/UL — SIGNIFICANT CHANGE UP (ref 3.8–10.5)

## 2020-11-01 PROCEDURE — 99232 SBSQ HOSP IP/OBS MODERATE 35: CPT

## 2020-11-01 RX ORDER — FUROSEMIDE 40 MG
40 TABLET ORAL DAILY
Refills: 0 | Status: DISCONTINUED | OUTPATIENT
Start: 2020-11-01 | End: 2020-11-01

## 2020-11-01 RX ORDER — WARFARIN SODIUM 2.5 MG/1
3 TABLET ORAL ONCE
Refills: 0 | Status: COMPLETED | OUTPATIENT
Start: 2020-11-01 | End: 2020-11-01

## 2020-11-01 RX ORDER — FUROSEMIDE 40 MG
40 TABLET ORAL
Refills: 0 | Status: DISCONTINUED | OUTPATIENT
Start: 2020-11-01 | End: 2020-11-02

## 2020-11-01 RX ORDER — SODIUM CHLORIDE 9 MG/ML
250 INJECTION INTRAMUSCULAR; INTRAVENOUS; SUBCUTANEOUS ONCE
Refills: 0 | Status: COMPLETED | OUTPATIENT
Start: 2020-11-01 | End: 2020-11-01

## 2020-11-01 RX ADMIN — Medication 100 MICROGRAM(S): at 05:15

## 2020-11-01 RX ADMIN — WARFARIN SODIUM 3 MILLIGRAM(S): 2.5 TABLET ORAL at 21:47

## 2020-11-01 RX ADMIN — SODIUM CHLORIDE 250 MILLILITER(S): 9 INJECTION INTRAMUSCULAR; INTRAVENOUS; SUBCUTANEOUS at 21:48

## 2020-11-01 RX ADMIN — ATORVASTATIN CALCIUM 20 MILLIGRAM(S): 80 TABLET, FILM COATED ORAL at 21:47

## 2020-11-01 RX ADMIN — Medication 40 MILLIGRAM(S): at 14:33

## 2020-11-01 RX ADMIN — Medication 5 MILLIGRAM(S): at 21:47

## 2020-11-01 RX ADMIN — Medication 81 MILLIGRAM(S): at 11:26

## 2020-11-01 RX ADMIN — Medication 25 MILLIGRAM(S): at 11:26

## 2020-11-01 NOTE — CHART NOTE - NSCHARTNOTEFT_GEN_A_CORE
HOME O2 EVALUATION    Pulse Ox Room Air Rest: 100%    Pulse Ox Room Air Ambulatin%    Pulse Ox on O2          L Ambulating:    Pulse Ox Post Ambulation: 95% room air

## 2020-11-01 NOTE — PROGRESS NOTE ADULT - SUBJECTIVE AND OBJECTIVE BOX
Patient is a 89y old  Female who presents with a chief complaint of sob/weakness (27 Oct 2020 13:02)      HPI:      89 yo F with pmh afib on coumadin, HTN, Hyperlipidemia, hypothyroidism, h/o HFpEF, h/o pleural effusions treated with diuresis now presenting with complaints of sob and weakness. She was found to have a left sided pleural effusion on an o/p CXR. Endorses that her lasix was temporarily held as it was initially thought her ssx were related to dehydration. She has since been restarted on lasix 40 as opposed to lasix 20 po qd and spironolactone was also added.   ED was found to have mod to large left sided pleural effusion. Was not found to be hypoxic on admission. +B/L LE edema  Otherwise denies cp. No N/V/D. No hemoptysis. TNI 0.07. No ekg changes. Na 129. INR 4.9 with no e/o bleeding     10/29  No acute pulmonary events occurred overnight  Discussed plan with son, Pablo    10/30  No acute pulmonary events occurred overnight    10/31  uneventful overnight  no distress  11/1/  resting comfortably  no resp distress  PREVIOUS DIAGNOSTIC TESTING:      MEDICATIONS  (STANDING):  aspirin  chewable 81 milliGRAM(s) Oral daily  atorvastatin 20 milliGRAM(s) Oral at bedtime  diltiazem    milliGRAM(s) Oral daily  furosemide   Injectable 40 milliGRAM(s) IV Push daily  levothyroxine 112 MICROGram(s) Oral daily  melatonin 5 milliGRAM(s) Oral at bedtime  metoprolol tartrate 25 milliGRAM(s) Oral two times a day    MEDICATIONS  (PRN):  acetaminophen   Tablet .. 650 milliGRAM(s) Oral every 4 hours PRN Mild Pain (1 - 3)  aluminum hydroxide/magnesium hydroxide/simethicone Suspension 30 milliLiter(s) Oral every 4 hours PRN Dyspepsia  ondansetron Injectable 4 milliGRAM(s) IV Push every 6 hours PRN Nausea      FAMILY HISTORY:    Vital Signs Last 24 Hrs  T(C): 36.8 (31 Oct 2020 20:56), Max: 36.8 (31 Oct 2020 20:56)  T(F): 98.3 (31 Oct 2020 20:56), Max: 98.3 (31 Oct 2020 20:56)  HR: 83 (31 Oct 2020 20:56) (76 - 83)  BP: 109/64 (31 Oct 2020 20:56) (101/47 - 109/64)  BP(mean): --  RR: --  SpO2: 97% (31 Oct 2020 20:56) (97% - 98%)  PHYSICAL EXAM  General Appearance: cooperative, no acute distress,   HEENT: PERRL, conjunctiva clear, EOM's intact, non injected pharynx, no exudate, TM   normal  Neck: Supple, , no adenopathy, thyroid: not enlarged, no carotid bruit or JVD  Back: Symmetric, no  tenderness,no soft tissue tenderness  Lungs: diminished bilaterally  Heart: Regular rate and rhythm, S1, S2 normal, no murmur, rub or gallop  Abdomen: Soft, non-tender, bowel sounds active , no hepatosplenomegaly  Extremities: +GAVINO  Skin: Skin color, texture normal, no rashes   Neurologic: Alert and oriented X3 , cranial nerves intact, sensory and motor normal,    ECG:    LABS:                          13.4   8.34  )-----------( 167      ( 27 Oct 2020 11:11 )             40.4     10-28    136  |  101  |  17  ----------------------------<  94  4.3   |  33<H>  |  0.77    Ca    8.9      28 Oct 2020 07:47  Mg     2.0     10-27    TPro  7.2  /  Alb  3.0<L>  /  TBili  1.1  /  DBili  x   /  AST  30  /  ALT  19  /  AlkPhos  62  10-27    CARDIAC MARKERS ( 27 Oct 2020 18:06 )  <0.015 ng/mL / x     / x     / x     / x      CARDIAC MARKERS ( 27 Oct 2020 14:52 )  <0.015 ng/mL / x     / x     / x     / x            Pro BNP  9443 10-27 @ 11:11  D Dimer  -- 10-27 @ 11:11    PT/INR - ( 28 Oct 2020 07:47 )   PT: 54.3 sec;   INR: 5.01 ratio         PTT - ( 27 Oct 2020 11:11 )  PTT:40.6 sec          RADIOLOGY & ADDITIONAL STUDIES:

## 2020-11-01 NOTE — PROGRESS NOTE ADULT - SUBJECTIVE AND OBJECTIVE BOX
seen and examined at bedside   reviewed w nursing team     no chest pain     about to eat her omelette     ros all others are neg     Vital Signs Last 24 Hrs  T(C): 36.7 (01 Nov 2020 09:08), Max: 36.8 (31 Oct 2020 20:56)  T(F): 98 (01 Nov 2020 09:08), Max: 98.3 (31 Oct 2020 20:56)  HR: 91 (01 Nov 2020 09:08) (83 - 91)  BP: 91/50 (01 Nov 2020 09:08) (91/50 - 109/64)  RR: 18 (01 Nov 2020 09:08) (18 - 18)  SpO2: 97% (01 Nov 2020 09:08) (97% - 97%)                          12.9   7.93  )-----------( 223      ( 01 Nov 2020 07:55 )             40.2     11-01    138  |  102  |  17  ----------------------------<  85  3.8   |  29  |  0.66    Ca    8.9      01 Nov 2020 07:55                                                                         Home Medications:  Centrum Silver oral tablet: 1 tab(s) orally once a day (27 Oct 2020 13:23)  furosemide 20 mg oral tablet: 2 tab(s) orally 2 times a day (27 Oct 2020 13:23)  levothyroxine 112 mcg (0.112 mg) oral tablet: 1 tab(s) orally once a day (27 Oct 2020 13:23)  lisinopril 2.5 mg oral tablet: 0.5 tab(s) orally once a day (27 Oct 2020 13:23)  Melatonin 3 mg oral tablet: 2 tab(s) orally once (at bedtime) (27 Oct 2020 13:23)  Metoprolol Tartrate 25 mg oral tablet: 1 tab(s) orally 2 times a day (27 Oct 2020 13:23)  pravastatin 20 mg oral tablet: 1 tab(s) orally once a day (at bedtime) (27 Oct 2020 13:23)  warfarin 2 mg oral tablet: 1 tab(s) orally once a day (27 Oct 2020 13:23)    MEDICATIONS  (STANDING):  aspirin  chewable 81 milliGRAM(s) Oral daily  atorvastatin 20 milliGRAM(s) Oral at bedtime  furosemide    Tablet 40 milliGRAM(s) Oral daily  levothyroxine 100 MICROGram(s) Oral daily  melatonin 5 milliGRAM(s) Oral at bedtime  metoprolol succinate ER 25 milliGRAM(s) Oral daily  warfarin 3 milliGRAM(s) Oral once    MEDICATIONS  (PRN):  acetaminophen   Tablet .. 650 milliGRAM(s) Oral every 4 hours PRN Mild Pain (1 - 3)  aluminum hydroxide/magnesium hydroxide/simethicone Suspension 30 milliLiter(s) Oral every 4 hours PRN Dyspepsia  ondansetron Injectable 4 milliGRAM(s) IV Push every 6 hours PRN Nausea

## 2020-11-01 NOTE — PROGRESS NOTE ADULT - ADDITIONAL PE
10
labs, med list reviewed, viewed

## 2020-11-01 NOTE — PROGRESS NOTE ADULT - SUBJECTIVE AND OBJECTIVE BOX
The patient was seen and examined. No acute events overnight.  No chest pain.  Improved shortness of breath.  No CP  HR stable  BP remains on low side      _____________________________  ELIZABETH LACY is a 89y year old Female with a past medical history of chronic diastolic heart failure, chronic atrial fibrillation on anticoagulation with warfarin, hypertension, hyperlipidemia, moderate mitral valve regurgitation, moderate stenosis, with severe pulmonary hypertension who was hospitalized last year for heart failure and responded to diuretics.  The patient was seen in the office on 26, with complaints of shortness of breath, and advised to go to the ER given her symptoms of shortness of breath and pleural effusions on chest x-ray.  She declined.     She continued to feel short of breath and subsequently was sent to the ER.  She was admitted for acute diastolic heart failure.  Currently she is stable on oxygen without chest pain, or palpitations.  No dizziness or lightheadedness.    PREVIOUS CARDIAC WORKUP:    Echocardiogram: October 26, 2020-  --There is severe left atrial dilatation (LA volume index 65 ml/m²).  --There is mild left ventricular hypertrophy.  --LV global wall motion is hypokinetic.  --LV ejection fraction (50 %) is borderline normal.  --Left ventricular ejection fraction 50 to 55%. There is asymmetric apical hypertrophy present.  --There is severe right atrial dilatation.  --The aortic root is normal in size (3.40 cm). Ascending aorta is dilated; its diameter is 3.90   cm (2.6 cm/m²).  --The aortic valve is moderately calcified. There is moderate aortic stenosis. The aortic valve   area, by the continuity equation, is 1.03 cm². There is mild aortic regurgitation.  --There is mild to moderate mitral regurgitation.  --There is moderate to severe tricuspid regurgitation.  --There is mild pulmonic regurgitation.  --The right atrial pressure is elevated (11-20 mm Hg). There is severe pulmonary hypertension.  --There is a trace pericardial effusion. Left pleural effusion is present. Right pleural   effusion is present.     ________________________________  Review of systems: A 10 point review of system has been performed, and is negative except for what has been mentioned in the above history of present illness.     PAST MEDICAL & SURGICAL HISTORY:  Hypothyroid    Hyperlipidemia    HTN (hypertension)    AF (atrial fibrillation)         Home Medications:  Centrum Silver oral tablet: 1 tab(s) orally once a day (27 Oct 2020 13:23)  furosemide 20 mg oral tablet: 2 tab(s) orally 2 times a day (27 Oct 2020 13:23)  levothyroxine 112 mcg (0.112 mg) oral tablet: 1 tab(s) orally once a day (27 Oct 2020 13:23)  lisinopril 2.5 mg oral tablet: 0.5 tab(s) orally once a day (27 Oct 2020 13:23)  Melatonin 3 mg oral tablet: 2 tab(s) orally once (at bedtime) (27 Oct 2020 13:23)  Metoprolol Tartrate 25 mg oral tablet: 1 tab(s) orally 2 times a day (27 Oct 2020 13:23)  pravastatin 20 mg oral tablet: 1 tab(s) orally once a day (at bedtime) (27 Oct 2020 13:23)  warfarin 2 mg oral tablet: 1 tab(s) orally once a day (27 Oct 2020 13:23)     MEDICATIONS  (STANDING):  aspirin  chewable 81 milliGRAM(s) Oral daily  atorvastatin 20 milliGRAM(s) Oral at bedtime  levothyroxine 100 MICROGram(s) Oral daily  melatonin 5 milliGRAM(s) Oral at bedtime  metoprolol succinate ER 25 milliGRAM(s) Oral daily  warfarin 3 milliGRAM(s) Oral once    MEDICATIONS  (PRN):  acetaminophen   Tablet .. 650 milliGRAM(s) Oral every 4 hours PRN Mild Pain (1 - 3)  aluminum hydroxide/magnesium hydroxide/simethicone Suspension 30 milliLiter(s) Oral every 4 hours PRN Dyspepsia  ondansetron Injectable 4 milliGRAM(s) IV Push every 6 hours PRN Nausea      Vital Signs Last 24 Hrs  T(C): 36.7 (01 Nov 2020 09:08), Max: 36.8 (31 Oct 2020 20:56)  T(F): 98 (01 Nov 2020 09:08), Max: 98.3 (31 Oct 2020 20:56)  HR: 91 (01 Nov 2020 09:08) (83 - 91)  BP: 91/50 (01 Nov 2020 09:08) (91/50 - 109/64)  BP(mean): --  RR: 18 (01 Nov 2020 09:08) (18 - 18)  SpO2: 97% (01 Nov 2020 09:08) (97% - 97%)  I&O's Summary    ________________________________  GENERAL APPEARANCE:  No acute distress  HEAD: normocephalic, atraumatic  NECK: supple, + jugular venous distention, no carotid bruit    HEART: Irregularly irregular, S1, S2 normal, 2/6 regurgitant murmur    CHEST:  No anterior chest wall tenderness    LUNGS: Coarse with decreased breath sounds at bases    ABDOMEN: soft, nontender, nondistended, with positive bowel sounds appreciated  EXTREMITIES: No clubbing bilateral extremity edema  NEURO: Alert and oriented x3  PSYC:  Normal affect  SKIN:  Dry  ________________________________   TELEMETRY: Rate controlled A. fib    ECG: Per my interpretation, rate controlled atrial fibrillation with nonspecific changes.    LABS:                                    12.9   7.93  )-----------( 223      ( 01 Nov 2020 07:55 )             40.2          11-01    138  |  102  |  17  ----------------------------<  85  3.8   |  29  |  0.66    Ca    8.9      01 Nov 2020 07:55          PT/INR - ( 01 Nov 2020 07:55 )   PT: 20.3 sec;   INR: 1.78 ratio         PTT - ( 01 Nov 2020 07:55 )  PTT:33.6 sec              ________________________________    RADIOLOGY & ADDITIONAL STUDIES:  IMPRESSION:  Moderate bilateral pleural effusions, right side greater than left.  Upper abdominal ascites.    ________________________________    ASSESSMENT:    Acute on chronic diastolic heart failure  Pleural Effusion on CT-similar to prior presentation  Chronic atrial fibrillation on anticoagulation  Supratherapeutic INR  Chronic venous insufficiency  Hypertension  Hyperlipidemia  Pulmonary hypertension valvular heart disease with aortic stenosis (aortic valve area of 1.03) and moderate mitral valve regurgitation        PLAN:  In summary, this is a 9-year-old female with a past medical history of valvular heart disease, chronic diastolic heart failure and atrial fibrillation admitted for acute diastolic heart failure.    Resume oral Lasix with 40 mg twice daily.  Heart remains controlled off of Cardizem.  Continue with metoprolol.  Continue warfarin.  Continue aspirin.  Hemoglobin stable.  Continue statin.  No need to resume ACE inhibitor given low blood pressures.  Discussed with son yesterday, he would not want her to be in rehab.  States that he was staying with her at home.  Will defer discharge planning to primary.        __________________________________________________________________________  Thank you for allowing me to participate in the care of your patient. Please contact me should any questions arise.    ALFONZO Ayala DO, [facility]  [phone]   [employer] [place] case ID#[id number]

## 2020-11-01 NOTE — PROGRESS NOTE ADULT - ASSESSMENT
1) ADHF  2) Dyspnea  3) Bilateral Effusions  4) Abnormal CT Chest     89 yo F with pmh afib on coumadin, HTN, Hyperlipidemia, hypothyroidism, h/o HFpEF, h/o pleural effusions treated with diuresis now presenting with complaints of sob and weakness. She was found to have a left sided pleural effusion on an o/p CXR. Endorses that her lasix was temporarily held as it was initially thought her ssx were related to dehydration. She has since been restarted on lasix 40 as opposed to lasix 20 po qd and spironolactone was also added.   ED was found to have mod to large left sided pleural effusion. Was not found to be hypoxic on admission. +B/L LE edema  Otherwise denies cp. No N/V/D. No hemoptysis. TNI 0.07. No ekg changes. Na 129. INR 4.9 with no e/o bleeding   Reviewed CT Chest  Agree with HF management  INR is supratherapeutic >5--3.93-->2.75  Will likely need right sided thoracentesis once INR <2 if pleural effusions do not improve  cont with current therapy given pt's advanced age  Left>Right pleural effusion on repeat cxr reviewed  Right > left sided effusion on recent ct scan chest reviewed    INTERPRETATION:  AP erect chest on October 30, 2020 at 8:34 AM. Clinically patient has CHF.    Heart enlargement, mild to moderate CHF in the lungs, and mild to moderate basilar effusions left greater than right again noted.    Chest is similar to October 27.    IMPRESSION: Persistent CHF.    DR Barber will resume in am

## 2020-11-01 NOTE — PROGRESS NOTE ADULT - ASSESSMENT
Assessment and Plan:    89 yo F     Frailty                                                                                                                                                                                                                                                                                                                                 Elderly     Admitted for acute on chronic decompensated HF NYSA IV, ACC SC   Afib on coumadin, h/o HFpEF, h/o pleural effusions requiring intermittent Bipap (now off) s/p appropriate response to iv intermittent lasix   cards/pulm team seen   INR goal 2-3   Check o2 for home o2 set up   PT OT   home/hhc per patient son (deesnt want SNF per discussion)    in am     Hypotension   Frail   Elderly   Fall risk   cards team to assist with final HF regimen     Essential HTN as above   advanced age, BP targets appropriate to age     Hyperlipidemia    Hypothyroidism    Code status: Full Code reviewed with patient, MOLST completed   Reviewed with patient, nursing plan of care   VTE proph on coumadin     EST DC in am Assessment and Plan:    89 yo F     Frailty                                                                                                                                                                                                                                                                                                                                 Elderly     Admitted for acute on chronic decompensated HF NYSA IV, ACC SC   Afib on coumadin, h/o HFpEF, h/o pleural effusions requiring intermittent Bipap (now off) s/p appropriate response to iv intermittent lasix changed to lasix po bid   cards/pulm team seen  monitor clinically   INR goal 2-3   Check o2 may need home o2 set up   PT OT   home/hhc per patient son doesn't want SNF per discussion)    in am     Hypotension   Frail   Elderly   Fall risk   cards team to assist with final HF regimen     Essential HTN as above   advanced age, BP targets appropriate to age     Hyperlipidemia    Hypothyroidism    Code status: Full Code reviewed with patient, MOLST completed   Reviewed with patient, nursing plan of care   VTE proph on coumadin     EST DC in am

## 2020-11-02 ENCOUNTER — TRANSCRIPTION ENCOUNTER (OUTPATIENT)
Age: 85
End: 2020-11-02

## 2020-11-02 LAB
ANION GAP SERPL CALC-SCNC: 8 MMOL/L — SIGNIFICANT CHANGE UP (ref 5–17)
APTT BLD: 34.2 SEC — SIGNIFICANT CHANGE UP (ref 27.5–35.5)
BUN SERPL-MCNC: 21 MG/DL — SIGNIFICANT CHANGE UP (ref 7–23)
CALCIUM SERPL-MCNC: 8.8 MG/DL — SIGNIFICANT CHANGE UP (ref 8.5–10.1)
CHLORIDE SERPL-SCNC: 104 MMOL/L — SIGNIFICANT CHANGE UP (ref 96–108)
CO2 SERPL-SCNC: 29 MMOL/L — SIGNIFICANT CHANGE UP (ref 22–31)
CREAT SERPL-MCNC: 0.77 MG/DL — SIGNIFICANT CHANGE UP (ref 0.5–1.3)
GLUCOSE SERPL-MCNC: 86 MG/DL — SIGNIFICANT CHANGE UP (ref 70–99)
HCT VFR BLD CALC: 39.4 % — SIGNIFICANT CHANGE UP (ref 34.5–45)
HGB BLD-MCNC: 12.7 G/DL — SIGNIFICANT CHANGE UP (ref 11.5–15.5)
INR BLD: 2.09 RATIO — HIGH (ref 0.88–1.16)
MCHC RBC-ENTMCNC: 29.6 PG — SIGNIFICANT CHANGE UP (ref 27–34)
MCHC RBC-ENTMCNC: 32.2 GM/DL — SIGNIFICANT CHANGE UP (ref 32–36)
MCV RBC AUTO: 91.8 FL — SIGNIFICANT CHANGE UP (ref 80–100)
PLATELET # BLD AUTO: 232 K/UL — SIGNIFICANT CHANGE UP (ref 150–400)
POTASSIUM SERPL-MCNC: 4 MMOL/L — SIGNIFICANT CHANGE UP (ref 3.5–5.3)
POTASSIUM SERPL-SCNC: 4 MMOL/L — SIGNIFICANT CHANGE UP (ref 3.5–5.3)
PROTHROM AB SERPL-ACNC: 23.4 SEC — HIGH (ref 10.6–13.6)
RBC # BLD: 4.29 M/UL — SIGNIFICANT CHANGE UP (ref 3.8–5.2)
RBC # FLD: 12.9 % — SIGNIFICANT CHANGE UP (ref 10.3–14.5)
SODIUM SERPL-SCNC: 141 MMOL/L — SIGNIFICANT CHANGE UP (ref 135–145)
WBC # BLD: 7.05 K/UL — SIGNIFICANT CHANGE UP (ref 3.8–10.5)
WBC # FLD AUTO: 7.05 K/UL — SIGNIFICANT CHANGE UP (ref 3.8–10.5)

## 2020-11-02 PROCEDURE — 99232 SBSQ HOSP IP/OBS MODERATE 35: CPT

## 2020-11-02 PROCEDURE — 71045 X-RAY EXAM CHEST 1 VIEW: CPT | Mod: 26

## 2020-11-02 RX ORDER — FUROSEMIDE 40 MG
40 TABLET ORAL DAILY
Refills: 0 | Status: DISCONTINUED | OUTPATIENT
Start: 2020-11-02 | End: 2020-11-03

## 2020-11-02 RX ORDER — MIDODRINE HYDROCHLORIDE 2.5 MG/1
2.5 TABLET ORAL THREE TIMES A DAY
Refills: 0 | Status: DISCONTINUED | OUTPATIENT
Start: 2020-11-02 | End: 2020-11-02

## 2020-11-02 RX ORDER — MIDODRINE HYDROCHLORIDE 2.5 MG/1
2.5 TABLET ORAL THREE TIMES A DAY
Refills: 0 | Status: DISCONTINUED | OUTPATIENT
Start: 2020-11-02 | End: 2020-11-03

## 2020-11-02 RX ORDER — FUROSEMIDE 40 MG
2 TABLET ORAL
Qty: 0 | Refills: 0 | DISCHARGE

## 2020-11-02 RX ORDER — METOPROLOL TARTRATE 50 MG
1 TABLET ORAL
Qty: 30 | Refills: 0
Start: 2020-11-02 | End: 2020-12-01

## 2020-11-02 RX ORDER — LISINOPRIL 2.5 MG/1
0.5 TABLET ORAL
Qty: 0 | Refills: 0 | DISCHARGE

## 2020-11-02 RX ORDER — ASPIRIN/CALCIUM CARB/MAGNESIUM 324 MG
1 TABLET ORAL
Qty: 30 | Refills: 0
Start: 2020-11-02 | End: 2020-12-01

## 2020-11-02 RX ORDER — WARFARIN SODIUM 2.5 MG/1
2 TABLET ORAL ONCE
Refills: 0 | Status: COMPLETED | OUTPATIENT
Start: 2020-11-02 | End: 2020-11-02

## 2020-11-02 RX ORDER — LEVOTHYROXINE SODIUM 125 MCG
1 TABLET ORAL
Qty: 30 | Refills: 0
Start: 2020-11-02

## 2020-11-02 RX ORDER — LEVOTHYROXINE SODIUM 125 MCG
1 TABLET ORAL
Qty: 0 | Refills: 0 | DISCHARGE

## 2020-11-02 RX ORDER — FUROSEMIDE 40 MG
1 TABLET ORAL
Qty: 30 | Refills: 0
Start: 2020-11-02 | End: 2020-12-01

## 2020-11-02 RX ORDER — METOPROLOL TARTRATE 50 MG
1 TABLET ORAL
Qty: 0 | Refills: 0 | DISCHARGE

## 2020-11-02 RX ADMIN — Medication 40 MILLIGRAM(S): at 10:22

## 2020-11-02 RX ADMIN — WARFARIN SODIUM 2 MILLIGRAM(S): 2.5 TABLET ORAL at 21:37

## 2020-11-02 RX ADMIN — Medication 5 MILLIGRAM(S): at 21:38

## 2020-11-02 RX ADMIN — Medication 25 MILLIGRAM(S): at 10:22

## 2020-11-02 RX ADMIN — Medication 100 MICROGRAM(S): at 06:33

## 2020-11-02 RX ADMIN — MIDODRINE HYDROCHLORIDE 2.5 MILLIGRAM(S): 2.5 TABLET ORAL at 12:06

## 2020-11-02 RX ADMIN — MIDODRINE HYDROCHLORIDE 2.5 MILLIGRAM(S): 2.5 TABLET ORAL at 17:31

## 2020-11-02 RX ADMIN — Medication 81 MILLIGRAM(S): at 10:22

## 2020-11-02 RX ADMIN — ATORVASTATIN CALCIUM 20 MILLIGRAM(S): 80 TABLET, FILM COATED ORAL at 21:38

## 2020-11-02 NOTE — DISCHARGE NOTE PROVIDER - NSDCMRMEDTOKEN_GEN_ALL_CORE_FT
aspirin 81 mg oral tablet, chewable: 1 tab(s) orally once a day  Centrum Silver oral tablet: 1 tab(s) orally once a day  furosemide 40 mg oral tablet: 1 tab(s) orally once a day  levothyroxine 100 mcg (0.1 mg) oral tablet: 1 tab(s) orally once a day  Melatonin 3 mg oral tablet: 2 tab(s) orally once (at bedtime)  metoprolol succinate 25 mg oral tablet, extended release: 1 tab(s) orally once a day  pravastatin 20 mg oral tablet: 1 tab(s) orally once a day (at bedtime)  warfarin 2 mg oral tablet: 1 tab(s) orally once a day   aspirin 81 mg oral tablet, chewable: 1 tab(s) orally once a day  Centrum Silver oral tablet: 1 tab(s) orally once a day  furosemide 40 mg oral tablet: 1 tab(s) orally once a day  levothyroxine 100 mcg (0.1 mg) oral tablet: 1 tab(s) orally once a day  Melatonin 3 mg oral tablet: 2 tab(s) orally once (at bedtime)  metoprolol succinate 25 mg oral tablet, extended release: 1 tab(s) orally once a day  midodrine 2.5 mg oral tablet: 1 tab(s) orally 3 times a day  pravastatin 20 mg oral tablet: 1 tab(s) orally once a day (at bedtime)  warfarin 2 mg oral tablet: 1 tab(s) orally once a day

## 2020-11-02 NOTE — PROGRESS NOTE ADULT - ASSESSMENT
1) ADHF  2) Dyspnea  3) Bilateral Effusions  4) Abnormal CT Chest     89 yo F with pmh afib on coumadin, HTN, Hyperlipidemia, hypothyroidism, h/o HFpEF, h/o pleural effusions treated with diuresis now presenting with complaints of sob and weakness. She was found to have a left sided pleural effusion on an o/p CXR. Endorses that her lasix was temporarily held as it was initially thought her ssx were related to dehydration. She has since been restarted on lasix 40 as opposed to lasix 20 po qd and spironolactone was also added.   ED was found to have mod to large left sided pleural effusion. Was not found to be hypoxic on admission. +B/L LE edema  Reviewed CT Chest  Agree with HF management  INR is supratherapeutic >5--3.93-->2.75-->1.7  Will likely need right sided thoracentesis once INR <2 if pleural effusions do not improve  Right > left sided effusion on recent ct scan chest reviewed  Recommend right sided thoracentesis

## 2020-11-02 NOTE — PROGRESS NOTE ADULT - SUBJECTIVE AND OBJECTIVE BOX
The patient was seen and examined. No acute events overnight.  No chest pain.  Improved shortness of breath.  No CP  HR stable  BP remains on low side but no dizziness.      _____________________________  ELIZABETH LACY is a 89y year old Female with a past medical history of chronic diastolic heart failure, chronic atrial fibrillation on anticoagulation with warfarin, hypertension, hyperlipidemia, moderate mitral valve regurgitation, moderate stenosis, with severe pulmonary hypertension who was hospitalized last year for heart failure and responded to diuretics.  The patient was seen in the office on 26, with complaints of shortness of breath, and advised to go to the ER given her symptoms of shortness of breath and pleural effusions on chest x-ray.  She declined.     She continued to feel short of breath and subsequently was sent to the ER.  She was admitted for acute diastolic heart failure.  Currently she is stable on oxygen without chest pain, or palpitations.  No dizziness or lightheadedness.    PREVIOUS CARDIAC WORKUP:    Echocardiogram: October 26, 2020-  --There is severe left atrial dilatation (LA volume index 65 ml/m²).  --There is mild left ventricular hypertrophy.  --LV global wall motion is hypokinetic.  --LV ejection fraction (50 %) is borderline normal.  --Left ventricular ejection fraction 50 to 55%. There is asymmetric apical hypertrophy present.  --There is severe right atrial dilatation.  --The aortic root is normal in size (3.40 cm). Ascending aorta is dilated; its diameter is 3.90   cm (2.6 cm/m²).  --The aortic valve is moderately calcified. There is moderate aortic stenosis. The aortic valve   area, by the continuity equation, is 1.03 cm². There is mild aortic regurgitation.  --There is mild to moderate mitral regurgitation.  --There is moderate to severe tricuspid regurgitation.  --There is mild pulmonic regurgitation.  --The right atrial pressure is elevated (11-20 mm Hg). There is severe pulmonary hypertension.  --There is a trace pericardial effusion. Left pleural effusion is present. Right pleural   effusion is present.     ________________________________  Review of systems: A 10 point review of system has been performed, and is negative except for what has been mentioned in the above history of present illness.     PAST MEDICAL & SURGICAL HISTORY:  Hypothyroid    Hyperlipidemia    HTN (hypertension)    AF (atrial fibrillation)         Home Medications:  Centrum Silver oral tablet: 1 tab(s) orally once a day (27 Oct 2020 13:23)  furosemide 20 mg oral tablet: 2 tab(s) orally 2 times a day (27 Oct 2020 13:23)  levothyroxine 112 mcg (0.112 mg) oral tablet: 1 tab(s) orally once a day (27 Oct 2020 13:23)  lisinopril 2.5 mg oral tablet: 0.5 tab(s) orally once a day (27 Oct 2020 13:23)  Melatonin 3 mg oral tablet: 2 tab(s) orally once (at bedtime) (27 Oct 2020 13:23)  Metoprolol Tartrate 25 mg oral tablet: 1 tab(s) orally 2 times a day (27 Oct 2020 13:23)  pravastatin 20 mg oral tablet: 1 tab(s) orally once a day (at bedtime) (27 Oct 2020 13:23)  warfarin 2 mg oral tablet: 1 tab(s) orally once a day (27 Oct 2020 13:23)    MEDICATIONS  (STANDING):  aspirin  chewable 81 milliGRAM(s) Oral daily  atorvastatin 20 milliGRAM(s) Oral at bedtime  furosemide    Tablet 40 milliGRAM(s) Oral daily  levothyroxine 100 MICROGram(s) Oral daily  melatonin 5 milliGRAM(s) Oral at bedtime  metoprolol succinate ER 25 milliGRAM(s) Oral daily    MEDICATIONS  (PRN):  acetaminophen   Tablet .. 650 milliGRAM(s) Oral every 4 hours PRN Mild Pain (1 - 3)  aluminum hydroxide/magnesium hydroxide/simethicone Suspension 30 milliLiter(s) Oral every 4 hours PRN Dyspepsia  ondansetron Injectable 4 milliGRAM(s) IV Push every 6 hours PRN Nausea      Vital Signs Last 24 Hrs  T(C): 36.3 (02 Nov 2020 07:17), Max: 36.6 (01 Nov 2020 20:42)  T(F): 97.4 (02 Nov 2020 07:17), Max: 97.8 (01 Nov 2020 20:42)  HR: 60 (02 Nov 2020 07:17) (60 - 68)  BP: 93/52 (02 Nov 2020 07:17) (88/47 - 93/52)  BP(mean): --  RR: 18 (02 Nov 2020 07:17) (18 - 18)  SpO2: 100% (02 Nov 2020 07:17) (97% - 100%)  I&O's Summary    ________________________________  GENERAL APPEARANCE:  No acute distress  HEAD: normocephalic, atraumatic  NECK: supple, + jugular venous distention, no carotid bruit    HEART: Irregularly irregular, S1, S2 normal, 2/6 regurgitant murmur    CHEST:  No anterior chest wall tenderness    LUNGS: Coarse with decreased breath sounds at bases    ABDOMEN: soft, nontender, nondistended, with positive bowel sounds appreciated  EXTREMITIES: No clubbing bilateral extremity edema  NEURO: Alert and oriented x3  PSYC:  Normal affect  SKIN:  Dry  ________________________________   TELEMETRY: Rate controlled A. fib    ECG: Per my interpretation, rate controlled atrial fibrillation with nonspecific changes.    LABS:                           12.7   7.05  )-----------( 232      ( 02 Nov 2020 07:34 )             39.4          11-02    141  |  104  |  21  ----------------------------<  86  4.0   |  29  |  0.77    Ca    8.8      02 Nov 2020 07:34    PT/INR - ( 01 Nov 2020 07:55 )   PT: 20.3 sec;   INR: 1.78 ratio         PTT - ( 01 Nov 2020 07:55 )  PTT:33.6 sec    ________________________________    RADIOLOGY & ADDITIONAL STUDIES:  IMPRESSION:  Moderate bilateral pleural effusions, right side greater than left.  Upper abdominal ascites.    ________________________________    ASSESSMENT:    Acute on chronic diastolic heart failure  Pleural Effusion on CT-similar to prior presentation  Chronic atrial fibrillation on anticoagulation  Supratherapeutic INR  Chronic venous insufficiency  Hypertension  Hyperlipidemia  Pulmonary hypertension valvular heart disease with aortic stenosis (aortic valve area of 1.03) and moderate mitral valve regurgitation        PLAN:  In summary, this is a 9-year-old female with a past medical history of valvular heart disease, chronic diastolic heart failure and atrial fibrillation admitted for acute diastolic heart failure.    Continue oral Lasix but decrease to daily dosing given low blood pressure.  Heart rate stable on current dose of metoprolol.  Continue warfarin.  Continue aspirin.  Hemoglobin stable.  No need to resume Cardizem as heart rate is been stable.  No need to resume ACE inhibitor as blood pressure has been low.  Check orthostatics-if positive, add ProAmatine.  Discharge planning per primary.  __________________________________________________________________________  Thank you for allowing me to participate in the care of your patient. Please contact me should any questions arise.    ALFONZO Ayala DO, [facility]  [phone]

## 2020-11-02 NOTE — PROGRESS NOTE ADULT - SUBJECTIVE AND OBJECTIVE BOX
CHIEF COMPLAINT/DIAGNOSIS: sob, weakness     HPI: 89 yo F with pmhx of afib on coumadin, HTN, Hyperlipidemia, hypothyroidism, HFpEF, pleural effusions treated with diuresis now presenting with complaints of sob and weakness. She was found to have a left sided pleural effusion on an outpatient CXR. Endorses that her Lasix was temporarily held as it was initially thought her ssx were related to dehydration. She has since been restarted on Lasix 40 as opposed to Lasix 20 po qd and spironolactone was also added.     ED was found to have mod to large left sided pleural effusion. Was not found to be hypoxic on admission. +B/L LE edema  Otherwise denies cp. No N/V/D. No hemoptysis. TNI 0.07. No ekg changes. Na 129. INR 4.9 with no e/o bleeding     11/2: feeling better, denies sob. breathing has improved.     REVIEW OF SYSTEMS:  All other review of systems is negative unless indicated above    PHYSICAL EXAM:  Constitutional: Awake and alert, cachectic   HEENT: Normal Hearing, MMM  Neck: Soft and supple, No LAD, No JVD  Respiratory: Breath sounds are clear crackles b/l   Cardiovascular: S1 and S2, IRIR  Gastrointestinal: Bowel Sounds present, soft, nontender, nondistended, no guarding, no rebound  Extremities: No peripheral edema  Vascular: 2+ peripheral pulses  Neurological: A/O x 3, no focal deficits  Musculoskeletal: 5/5 strength b/l upper and lower extremities  Skin: No rashes    Vital Signs Last 24 Hrs  T(C): 36.3 (02 Nov 2020 07:17), Max: 36.6 (01 Nov 2020 20:42)  T(F): 97.4 (02 Nov 2020 07:17), Max: 97.8 (01 Nov 2020 20:42)  HR: 60 (02 Nov 2020 07:17) (60 - 68)  BP: 93/52 (02 Nov 2020 07:17) (88/47 - 93/52)  BP(mean): --  RR: 18 (02 Nov 2020 07:17) (18 - 18)  SpO2: 100% (02 Nov 2020 07:17) (97% - 100%)    LABS: All Labs Reviewed:                        12.7   7.05  )-----------( 232      ( 02 Nov 2020 07:34 )             39.4     11-02    141  |  104  |  21  ----------------------------<  86  4.0   |  29  |  0.77    Ca    8.8      02 Nov 2020 07:34      PT/INR - ( 01 Nov 2020 07:55 )   PT: 20.3 sec;   INR: 1.78 ratio         PTT - ( 01 Nov 2020 07:55 )  PTT:33.6 sec    RADIOLOGY:  < from: Xray Chest 1 View-PORTABLE IMMEDIATE (Xray Chest 1 View-PORTABLE IMMEDIATE .) (10.30.20 @ 08:43) >  IMPRESSION: Persistent CHF.  < end of copied text >    < from: CT Chest No Cont (10.27.20 @ 12:43) >  IMPRESSION:  Moderate bilateral pleural effusions, right side greater than left.  Upper abdominal ascites.  < end of copied text >    ECHOCARDIOGRAM:  < from: TTE Echo Complete w/o Contrast w/ Doppler (10.28.20 @ 10:27) >   Summary     Fibrocalcific changes noted to the mitral valve leaflets with preserved   leaflet excursion.   Mild to Moderate mitral regurgitation is present.   Significantfibrocalcific changes noted to the aortic valve leaflets with   restriction in leaflet excursion. Mild aortic stenosis.   Trace aortic regurgitation is present.   The tricuspid valve leaflets appear mildly thickened and/or calcified, but   open well.   Severe (4+) tricuspid valve regurgitation is present. Moderate pulmonary   hypertension.   Mild pulmonic valvular regurgitation (1+) is present.   The left atrium appears moderately dilated.   Severe apical left ventricular hypertrophy is present.   Estimated left ventricular ejection fraction is >70 %.   Apically displaced papillary muscle.   The right ventricular apex is not well visualized.   Cannot rule out right apical hypertrophy   The IVC is dilated with decreased respiratory variation  The right atrium appears moderately to severely dilated.   Pleural effusion - is present.    < end of copied text >    MEDICATIONS  (STANDING):  aspirin  chewable 81 milliGRAM(s) Oral daily  atorvastatin 20 milliGRAM(s) Oral at bedtime  furosemide    Tablet 40 milliGRAM(s) Oral daily  levothyroxine 100 MICROGram(s) Oral daily  melatonin 5 milliGRAM(s) Oral at bedtime  metoprolol succinate ER 25 milliGRAM(s) Oral daily  midodrine. 2.5 milliGRAM(s) Oral three times a day    MEDICATIONS  (PRN):  acetaminophen   Tablet .. 650 milliGRAM(s) Oral every 4 hours PRN Mild Pain (1 - 3)  aluminum hydroxide/magnesium hydroxide/simethicone Suspension 30 milliLiter(s) Oral every 4 hours PRN Dyspepsia  ondansetron Injectable 4 milliGRAM(s) IV Push every 6 hours PRN Nausea    TELEMETRY REVIEW:  11/2: afib 60-80s    ASSESSMENT AND PLAN:    89 y/o female w/ PMHx significant for HFpEF, afib p/w sob and weakness    1) Acute on chronic diastolic HF  - tele monitoring. tele review as above. afib rate controlled  - s/p IV lasix -- cont. Lasix 40mg BID  - daily weights, 10lb weight loss noted | I&Os  - Cont. BB  - Nutrition cx for HF education  - BiPAP HS, supplemental 02 - 02 eval not requiring home 02   - Cardio, Pulm f/u appreciated   11/2: repeat CXR, check orthostatics    2) Chronic Afib  - INR daily. Goal 2-3  - Cont. Coumadin, BB  - d/c CCB - due to low sbp, rate controlled w/ BB                Frailty                                                                                                                                                                                                                                                                                                                                 Elderly     Admitted for acute on chronic decompensated HF NYSA IV, ACC SC   Afib on coumadin, h/o HFpEF, h/o pleural effusions requiring intermittent Bipap (now off) s/p appropriate response to iv intermittent lasix changed to lasix po bid   cards/pulm team seen  monitor clinically   INR goal 2-3   Check o2 may need home o2 set up   PT OT   home/hhc per patient son doesn't want SNF per discussion)    in am     Hypotension   Frail   Elderly   Fall risk   cards team to assist with final HF regimen     Essential HTN as above   advanced age, BP targets appropriate to age     Hyperlipidemia    Hypothyroidism    Code status: Full Code reviewed with patient, MOLST completed   Reviewed with patient, nursing plan of care   VTE proph on coumadin     EST DC in am CHIEF COMPLAINT/DIAGNOSIS: sob, weakness     HPI: 89 yo F with pmhx of afib on coumadin, HTN, Hyperlipidemia, hypothyroidism, HFpEF, pleural effusions treated with diuresis now presenting with complaints of sob and weakness. She was found to have a left sided pleural effusion on an outpatient CXR. Endorses that her Lasix was temporarily held as it was initially thought her ssx were related to dehydration. She has since been restarted on Lasix 40 as opposed to Lasix 20 po qd and spironolactone was also added.     ED was found to have mod to large left sided pleural effusion. Was not found to be hypoxic on admission. +B/L LE edema  Otherwise denies cp. No N/V/D. No hemoptysis. TNI 0.07. No ekg changes. Na 129. INR 4.9 with no e/o bleeding     11/2: feeling better, denies sob. breathing has improved.     REVIEW OF SYSTEMS:  All other review of systems is negative unless indicated above    PHYSICAL EXAM:  Constitutional: Awake and alert, cachectic   HEENT: Normal Hearing, MMM  Neck: Soft and supple, No LAD, No JVD  Respiratory: Breath sounds are clear crackles b/l   Cardiovascular: S1 and S2, IRIR  Gastrointestinal: Bowel Sounds present, soft, nontender, nondistended, no guarding, no rebound  Extremities: No peripheral edema  Vascular: 2+ peripheral pulses  Neurological: A/O x 3, no focal deficits  Musculoskeletal: 5/5 strength b/l upper and lower extremities  Skin: No rashes    Vital Signs Last 24 Hrs  T(C): 36.3 (02 Nov 2020 07:17), Max: 36.6 (01 Nov 2020 20:42)  T(F): 97.4 (02 Nov 2020 07:17), Max: 97.8 (01 Nov 2020 20:42)  HR: 60 (02 Nov 2020 07:17) (60 - 68)  BP: 93/52 (02 Nov 2020 07:17) (88/47 - 93/52)  BP(mean): --  RR: 18 (02 Nov 2020 07:17) (18 - 18)  SpO2: 100% (02 Nov 2020 07:17) (97% - 100%)    LABS: All Labs Reviewed:                        12.7   7.05  )-----------( 232      ( 02 Nov 2020 07:34 )             39.4     11-02    141  |  104  |  21  ----------------------------<  86  4.0   |  29  |  0.77    Ca    8.8      02 Nov 2020 07:34      PT/INR - ( 01 Nov 2020 07:55 )   PT: 20.3 sec;   INR: 1.78 ratio         PTT - ( 01 Nov 2020 07:55 )  PTT:33.6 sec    RADIOLOGY:  < from: Xray Chest 1 View-PORTABLE IMMEDIATE (Xray Chest 1 View-PORTABLE IMMEDIATE .) (10.30.20 @ 08:43) >  IMPRESSION: Persistent CHF.  < end of copied text >    < from: CT Chest No Cont (10.27.20 @ 12:43) >  IMPRESSION:  Moderate bilateral pleural effusions, right side greater than left.  Upper abdominal ascites.  < end of copied text >    ECHOCARDIOGRAM:  < from: TTE Echo Complete w/o Contrast w/ Doppler (10.28.20 @ 10:27) >   Summary     Fibrocalcific changes noted to the mitral valve leaflets with preserved   leaflet excursion.   Mild to Moderate mitral regurgitation is present.   Significantfibrocalcific changes noted to the aortic valve leaflets with   restriction in leaflet excursion. Mild aortic stenosis.   Trace aortic regurgitation is present.   The tricuspid valve leaflets appear mildly thickened and/or calcified, but   open well.   Severe (4+) tricuspid valve regurgitation is present. Moderate pulmonary   hypertension.   Mild pulmonic valvular regurgitation (1+) is present.   The left atrium appears moderately dilated.   Severe apical left ventricular hypertrophy is present.   Estimated left ventricular ejection fraction is >70 %.   Apically displaced papillary muscle.   The right ventricular apex is not well visualized.   Cannot rule out right apical hypertrophy   The IVC is dilated with decreased respiratory variation  The right atrium appears moderately to severely dilated.   Pleural effusion - is present.    < end of copied text >    MEDICATIONS  (STANDING):  aspirin  chewable 81 milliGRAM(s) Oral daily  atorvastatin 20 milliGRAM(s) Oral at bedtime  furosemide    Tablet 40 milliGRAM(s) Oral daily  levothyroxine 100 MICROGram(s) Oral daily  melatonin 5 milliGRAM(s) Oral at bedtime  metoprolol succinate ER 25 milliGRAM(s) Oral daily  midodrine. 2.5 milliGRAM(s) Oral three times a day    MEDICATIONS  (PRN):  acetaminophen   Tablet .. 650 milliGRAM(s) Oral every 4 hours PRN Mild Pain (1 - 3)  aluminum hydroxide/magnesium hydroxide/simethicone Suspension 30 milliLiter(s) Oral every 4 hours PRN Dyspepsia  ondansetron Injectable 4 milliGRAM(s) IV Push every 6 hours PRN Nausea    TELEMETRY REVIEW:  11/2: afib 60-80s    ASSESSMENT AND PLAN:    89 y/o female w/ PMHx significant for HFpEF, afib p/w sob and weakness    1) Acute on chronic diastolic HF  - tele monitoring. tele review as above. afib rate controlled  - s/p IV lasix -- cont. Lasix 40mg BID  - daily weights, 10lb weight loss noted | I&Os  - Cont. BB  - echo 10/2020: EF 70%, severe TR, mod pulm htn  - Nutrition cx for HF education  - BiPAP HS, supplemental 02 - 02 eval not requiring home 02   - Cardio, Pulm f/u appreciated   11/2: repeat CXR, check orthostatics    2) Chronic Afib  - INR daily. Goal 2-3  - Cont. Coumadin, BB  - d/c CCB - due to low sbp, rate controlled w/ BB    3) Hypotension  - check orthostatics  - start low dose midodrine. monitor.  - cardio f/u appreciated     4) Hypothyroidism   - TFTs reviewed. Synthroid reduced to 100mcg              Frailty                                                                                                                                                                                                                                                                                                                                 Elderly     Admitted for acute on chronic decompensated HF NYSA IV, ACC SC   Afib on coumadin, h/o HFpEF, h/o pleural effusions requiring intermittent Bipap (now off) s/p appropriate response to iv intermittent lasix changed to lasix po bid   cards/pulm team seen  monitor clinically   INR goal 2-3   Check o2 may need home o2 set up   PT OT   home/hhc per patient son doesn't want SNF per discussion)    in am     Hypotension   Frail   Elderly   Fall risk   cards team to assist with final HF regimen     Essential HTN as above   advanced age, BP targets appropriate to age     Hyperlipidemia    Hypothyroidism    Code status: Full Code reviewed with patient, MOLST completed   Reviewed with patient, nursing plan of care   VTE proph on coumadin     EST DC in am CHIEF COMPLAINT/DIAGNOSIS: sob, weakness     HPI: 89 yo F with pmhx of afib on coumadin, HTN, Hyperlipidemia, hypothyroidism, HFpEF, pleural effusions treated with diuresis now presenting with complaints of sob and weakness. She was found to have a left sided pleural effusion on an outpatient CXR. Endorses that her Lasix was temporarily held as it was initially thought her ssx were related to dehydration. She has since been restarted on Lasix 40 as opposed to Lasix 20 po qd and spironolactone was also added.     ED was found to have mod to large left sided pleural effusion. Was not found to be hypoxic on admission. +B/L LE edema  Otherwise denies cp. No N/V/D. No hemoptysis. TNI 0.07. No ekg changes. Na 129. INR 4.9 with no e/o bleeding     11/2: feeling better, denies sob. breathing has improved.     REVIEW OF SYSTEMS:  All other review of systems is negative unless indicated above    PHYSICAL EXAM:  Constitutional: Awake and alert, cachectic   HEENT: Normal Hearing, MMM  Neck: Soft and supple, No LAD, No JVD  Respiratory: Breath sounds are clear crackles b/l   Cardiovascular: S1 and S2, IRIR  Gastrointestinal: Bowel Sounds present, soft, nontender, nondistended, no guarding, no rebound  Extremities: No peripheral edema  Vascular: 2+ peripheral pulses  Neurological: A/O x 3, no focal deficits  Musculoskeletal: 5/5 strength b/l upper and lower extremities  Skin: No rashes    Vital Signs Last 24 Hrs  T(C): 36.3 (02 Nov 2020 07:17), Max: 36.6 (01 Nov 2020 20:42)  T(F): 97.4 (02 Nov 2020 07:17), Max: 97.8 (01 Nov 2020 20:42)  HR: 60 (02 Nov 2020 07:17) (60 - 68)  BP: 93/52 (02 Nov 2020 07:17) (88/47 - 93/52)  BP(mean): --  RR: 18 (02 Nov 2020 07:17) (18 - 18)  SpO2: 100% (02 Nov 2020 07:17) (97% - 100%)    LABS: All Labs Reviewed:                        12.7   7.05  )-----------( 232      ( 02 Nov 2020 07:34 )             39.4     11-02    141  |  104  |  21  ----------------------------<  86  4.0   |  29  |  0.77    Ca    8.8      02 Nov 2020 07:34      PT/INR - ( 01 Nov 2020 07:55 )   PT: 20.3 sec;   INR: 1.78 ratio         PTT - ( 01 Nov 2020 07:55 )  PTT:33.6 sec    RADIOLOGY:  < from: Xray Chest 1 View-PORTABLE IMMEDIATE (Xray Chest 1 View-PORTABLE IMMEDIATE .) (10.30.20 @ 08:43) >  IMPRESSION: Persistent CHF.  < end of copied text >    < from: CT Chest No Cont (10.27.20 @ 12:43) >  IMPRESSION:  Moderate bilateral pleural effusions, right side greater than left.  Upper abdominal ascites.  < end of copied text >    ECHOCARDIOGRAM:  < from: TTE Echo Complete w/o Contrast w/ Doppler (10.28.20 @ 10:27) >   Summary     Fibrocalcific changes noted to the mitral valve leaflets with preserved   leaflet excursion.   Mild to Moderate mitral regurgitation is present.   Significantfibrocalcific changes noted to the aortic valve leaflets with   restriction in leaflet excursion. Mild aortic stenosis.   Trace aortic regurgitation is present.   The tricuspid valve leaflets appear mildly thickened and/or calcified, but   open well.   Severe (4+) tricuspid valve regurgitation is present. Moderate pulmonary   hypertension.   Mild pulmonic valvular regurgitation (1+) is present.   The left atrium appears moderately dilated.   Severe apical left ventricular hypertrophy is present.   Estimated left ventricular ejection fraction is >70 %.   Apically displaced papillary muscle.   The right ventricular apex is not well visualized.   Cannot rule out right apical hypertrophy   The IVC is dilated with decreased respiratory variation  The right atrium appears moderately to severely dilated.   Pleural effusion - is present.    < end of copied text >    MEDICATIONS  (STANDING):  aspirin  chewable 81 milliGRAM(s) Oral daily  atorvastatin 20 milliGRAM(s) Oral at bedtime  furosemide    Tablet 40 milliGRAM(s) Oral daily  levothyroxine 100 MICROGram(s) Oral daily  melatonin 5 milliGRAM(s) Oral at bedtime  metoprolol succinate ER 25 milliGRAM(s) Oral daily  midodrine. 2.5 milliGRAM(s) Oral three times a day    MEDICATIONS  (PRN):  acetaminophen   Tablet .. 650 milliGRAM(s) Oral every 4 hours PRN Mild Pain (1 - 3)  aluminum hydroxide/magnesium hydroxide/simethicone Suspension 30 milliLiter(s) Oral every 4 hours PRN Dyspepsia  ondansetron Injectable 4 milliGRAM(s) IV Push every 6 hours PRN Nausea    TELEMETRY REVIEW:  11/2: afib 60-80s    ASSESSMENT AND PLAN:    89 y/o female w/ PMHx significant for HFpEF, afib p/w sob and weakness    1) Acute on chronic diastolic HF  - tele monitoring. tele review as above. afib rate controlled  - s/p IV lasix -- cont. Lasix 40mg QD  - daily weights, 10lb weight loss noted | I&Os  - Cont. BB  - echo 10/2020: EF 70%, severe TR, mod pulm htn  - Nutrition cx for HF education  - BiPAP HS, supplemental 02 - 02 eval not requiring home 02   - Cardio, Pulm f/u appreciated   11/2: repeat CXR, check orthostatics    2) Chronic Afib | Supratherapeutic INR - resolved  - INR daily. Goal 2-3  - Cont. Coumadin, BB  - d/c CCB - due to low sbp, rate controlled w/ BB    3) Hypotension  - check orthostatics  - start low dose midodrine. monitor.  - cardio f/u appreciated     4) Hypothyroidism   - TFTs reviewed. Synthroid reduced to 100mcg  - f/u pcp outpatient     5) HLD  - cont. statin    6) Nutrition eval for manlutrition     7) DVT ppx  - coumadin    Full Code. See GOC note.    Dispo: home with home PT. repeat CXR today. start midodrine for low sbps. cont. Lasix PO. 02 eval reviewed - did not qualify for home 02. plan discussed with son over phone all questions/ concerns addressed 11/2.

## 2020-11-02 NOTE — DISCHARGE NOTE PROVIDER - CARE PROVIDER_API CALL
Zaria Ring  INTERNAL MEDICINE  [address] [place] [address]  Phone: [phone]  Fax: [phone]  Follow Up Time:     Mason Aj  CARDIOVASCULAR DISEASE  [address] [place] [address]  Phone: [phone]  Fax: [phone]  Follow Up Time:     Neto Barber  INTERNAL MEDICINE  [address] [place] [address]  Phone: [phone]  Fax: [phone]  Follow Up Time:

## 2020-11-02 NOTE — DISCHARGE NOTE PROVIDER - INSTRUCTIONS
Low sodium, Low cholesterol  Choose foods that are low in salt - examples include: fresh meats, poultry, fish, eggs, milk and yogurt. Avoid packaged/processed foods and excessive table salt use. Too much fluid can make your heart failure worse. Have 4 to 6 cups of liquid per day (coffee, tea, soups, water) Keep in mind your liquid intake when eating foods that are liquid at room temperature.

## 2020-11-02 NOTE — DISCHARGE NOTE PROVIDER - PROVIDER TOKENS
PROVIDER:[TOKEN:[[id number]]],PROVIDER:[TOKEN:[[id number]]],PROVIDER:[TOKEN:[[id number]]]

## 2020-11-02 NOTE — DISCHARGE NOTE PROVIDER - NSDCCPCAREPLAN_GEN_ALL_CORE_FT
PRINCIPAL DISCHARGE DIAGNOSIS  Diagnosis: HF (heart failure)  Assessment and Plan of Treatment: You were found to have an acute exacerbation of your heart failure. Heart failure is a condition in which the heart can't pump enough blood to meet the body's needs. The weakening of the heart's pumping ability causes blood and fluid to back up into the lungs resulting in the buildup of fluid in the feet, ankles and legs -- called edema, tiredness and shortness of breath.   - Continue to monitor your weights at home daily  - Maintain a low sodium and low cholesterol diet   - Continue to take Lasix 40mg once a day (sent to pharmacy)   - Metoprolol Succinate 25mg once a day (sent to pharmacy, new medication)   - STOP taking metoprolol tartrate and Lisinopril   -  Follow up with your cardiologist Dr. Aj in 1 to 2 week after discharge for further management - Call to make an appointment.   - Your discharge weight is _   **Monitor for the following signs/symptoms: Unrelieved shortness of breath or shortness of breath at rest, unrelieved chest pain or tightness, wheezing, weight gain of 5lbs or more pounds in 1 week or 2-3lbs in 24hours, confusion, dizziness or lightheadedness. If you experience any of these signs/ symptoms please alert your primary care provider/cardiologist or return to the ED if your symptoms are severe**      SECONDARY DISCHARGE DIAGNOSES  Diagnosis: Chronic atrial fibrillation  Assessment and Plan of Treatment: Atrial fibrillation is a cardiac arrhythmia. An arrhythmia is a problem with the speed or rhythm of the heartbeat. Atrial fibrillation (A Fib) is the most common type of arrhythmia. The cause is a disorder in the heart's electrical system. To manage atrial fibrillation we recommend you continue to follow a heart healthy diet and maintain a healthy weight, do not smoke -- nicotine can cause heart damage and make it more difficult to manage your a-fib, limit alcohol intake and manage other health conditions -- this includes high blood pressure or cholesterol, sleep apnea, diabetes, and other heart conditions.   - Take medicine as directed and follow your treatment plan  - STOP taking Cardizem   - Metoprolol Succinate 25mg once a day (sent to pharmacy, new medication)   - Continue to take Coumadin for stroke prevention - Follow up with Dr. Aj for INR monitoring outpatient.    Diagnosis: Hypotension  Assessment and Plan of Treatment: - Continue midodrine 2.5mg three times a day. Continue to monitor your blood pressure 1 to 2 times a day and keep a log for your cardiologist.   - Follow up with your cardiologist Dr. Aj in 1 to 2 weeks after discharge for further management - Call to make an appointment.    Diagnosis: Hypothyroidism  Assessment and Plan of Treatment: - Continue Synthroid 100mcg once a day in the morning before breakfast (dose reduced from home dose, sent to pharmacy). Follow up with Dr. Ring for further managment.     PRINCIPAL DISCHARGE DIAGNOSIS  Diagnosis: HF (heart failure)  Assessment and Plan of Treatment: You were found to have an acute exacerbation of your heart failure. Heart failure is a condition in which the heart can't pump enough blood to meet the body's needs. The weakening of the heart's pumping ability causes blood and fluid to back up into the lungs resulting in the buildup of fluid in the feet, ankles and legs -- called edema, tiredness and shortness of breath.   - Continue to monitor your weights at home daily  - Maintain a low sodium and low cholesterol diet   - Continue to take Lasix 40mg once a day (sent to pharmacy)   - Metoprolol Succinate 25mg once a day (sent to pharmacy, new medication)   - STOP taking metoprolol tartrate and Lisinopril   -  Follow up with your cardiologist Dr. Aj in 1 to 2 week after discharge for further management - Call to make an appointment.   - Your discharge weight is _   **Monitor for the following signs/symptoms: Unrelieved shortness of breath or shortness of breath at rest, unrelieved chest pain or tightness, wheezing, weight gain of 5lbs or more pounds in 1 week or 2-3lbs in 24hours, confusion, dizziness or lightheadedness. If you experience any of these signs/ symptoms please alert your primary care provider/cardiologist or return to the ED if your symptoms are severe**      SECONDARY DISCHARGE DIAGNOSES  Diagnosis: Chronic atrial fibrillation  Assessment and Plan of Treatment: Atrial fibrillation is a cardiac arrhythmia. An arrhythmia is a problem with the speed or rhythm of the heartbeat. Atrial fibrillation (A Fib) is the most common type of arrhythmia. The cause is a disorder in the heart's electrical system. To manage atrial fibrillation we recommend you continue to follow a heart healthy diet and maintain a healthy weight, do not smoke -- nicotine can cause heart damage and make it more difficult to manage your a-fib, limit alcohol intake and manage other health conditions -- this includes high blood pressure or cholesterol, sleep apnea, diabetes, and other heart conditions.   - Take medicine as directed and follow your treatment plan  - STOP taking Cardizem   - Metoprolol Succinate 25mg once a day (sent to pharmacy, new medication)   - Continue to take Coumadin for stroke prevention - Follow up with Dr. Aj for INR monitoring outpatient.    Diagnosis: Hypotension  Assessment and Plan of Treatment: - Continue midodrine 2.5mg three times a day (new medication, sent to pharmacy). Continue to monitor your blood pressure 1 to 2 times a day and keep a log for your cardiologist.   - Follow up with your cardiologist Dr. Aj in 1 to 2 weeks after discharge for further management - Call to make an appointment.    Diagnosis: Hypothyroidism  Assessment and Plan of Treatment: - Continue Synthroid 100mcg once a day in the morning before breakfast (dose reduced from home dose, sent to pharmacy). Follow up with Dr. Ring for further managment.     PRINCIPAL DISCHARGE DIAGNOSIS  Diagnosis: HF (heart failure)  Assessment and Plan of Treatment: You were found to have an acute exacerbation of your heart failure. Heart failure is a condition in which the heart can't pump enough blood to meet the body's needs. The weakening of the heart's pumping ability causes blood and fluid to back up into the lungs resulting in the buildup of fluid in the feet, ankles and legs -- called edema, tiredness and shortness of breath.   - Continue to monitor your weights at home daily  - Maintain a low sodium and low cholesterol diet   - Continue to take Lasix 40mg once a day (sent to pharmacy)   - Metoprolol Succinate 25mg once a day (sent to pharmacy, new medication)   - STOP taking metoprolol tartrate and Lisinopril   -  Follow up with your cardiologist Dr. Aj in 1 to 2 week after discharge for further management - Call to make an appointment.   - Your discharge weight is 102.5lbs.   **Monitor for the following signs/symptoms: Unrelieved shortness of breath or shortness of breath at rest, unrelieved chest pain or tightness, wheezing, weight gain of 5lbs or more pounds in 1 week or 2-3lbs in 24hours, confusion, dizziness or lightheadedness. If you experience any of these signs/ symptoms please alert your primary care provider/cardiologist or return to the ED if your symptoms are severe**      SECONDARY DISCHARGE DIAGNOSES  Diagnosis: Chronic atrial fibrillation  Assessment and Plan of Treatment: Atrial fibrillation is a cardiac arrhythmia. An arrhythmia is a problem with the speed or rhythm of the heartbeat. Atrial fibrillation (A Fib) is the most common type of arrhythmia. The cause is a disorder in the heart's electrical system. To manage atrial fibrillation we recommend you continue to follow a heart healthy diet and maintain a healthy weight, do not smoke -- nicotine can cause heart damage and make it more difficult to manage your a-fib, limit alcohol intake and manage other health conditions -- this includes high blood pressure or cholesterol, sleep apnea, diabetes, and other heart conditions.   - Take medicine as directed and follow your treatment plan  - STOP taking Cardizem   - Metoprolol Succinate 25mg once a day (sent to pharmacy, new medication)   - Continue to take Coumadin for stroke prevention - Follow up with Dr. Aj for INR monitoring outpatient.    Diagnosis: Hypotension  Assessment and Plan of Treatment: - Continue midodrine 2.5mg three times a day (new medication, sent to pharmacy). Continue to monitor your blood pressure 1 to 2 times a day and keep a log for your cardiologist.   - Follow up with your cardiologist Dr. Aj in 1 to 2 weeks after discharge for further management - Call to make an appointment.    Diagnosis: Hypothyroidism  Assessment and Plan of Treatment: - Continue Synthroid 100mcg once a day in the morning before breakfast (dose reduced from home dose, sent to pharmacy). Follow up with Dr. Ring for further managment.

## 2020-11-02 NOTE — DISCHARGE NOTE PROVIDER - HOSPITAL COURSE
CHIEF COMPLAINT/DIAGNOSIS: sob, weakness     HPI: 89 yo F with pmhx of afib on coumadin, HTN, Hyperlipidemia, hypothyroidism, HFpEF, pleural effusions treated with diuresis now presenting with complaints of sob and weakness. She was found to have a left sided pleural effusion on an outpatient CXR. Endorses that her Lasix was temporarily held as it was initially thought her ssx were related to dehydration. She has since been restarted on Lasix 40 as opposed to Lasix 20 po qd and spironolactone was also added.     ED was found to have mod to large left sided pleural effusion. Was not found to be hypoxic on admission. +B/L LE edema  Otherwise denies cp. No N/V/D. No hemoptysis. TNI 0.07. No ekg changes. Na 129. INR 4.9 with no e/o bleeding     ASSESSMENT AND PLAN:    89 y/o female w/ PMHx significant for HFpEF, afib p/w sob and weakness    1) Acute on chronic diastolic HF  - tele monitoring. tele review as above. afib rate controlled  - s/p IV lasix -- cont. Lasix 40mg QD  - daily weights, 10lb weight loss noted | I&Os  - Cont. BB  - echo 10/2020: EF 70%, severe TR, mod pulm htn  - Nutrition cx for HF education  - BiPAP HS, supplemental 02 - 02 eval not requiring home 02   - Cardio, Pulm f/u appreciated   11/2: repeat CXR, check orthostatics    2) Chronic Afib | Supratherapeutic INR - resolved  - INR daily. Goal 2-3  - Cont. Coumadin, BB  - d/c CCB - due to low sbp, rate controlled w/ BB    3) Hypotension  - check orthostatics  - start low dose midodrine. monitor.  - cardio f/u appreciated     4) Hypothyroidism   - TFTs reviewed. Synthroid reduced to 100mcg  - f/u pcp outpatient     5) HLD  - cont. statin    6) Nutrition eval for manlutrition     7) DVT ppx  - coumadin    Full Code. See GOC note.    Dispo: home with home PT. repeat CXR today. start midodrine for low sbps. cont. Lasix PO. 02 eval reviewed - did not qualify for home 02. plan discussed with son over phone all questions/ concerns addressed 11/2.    Dispo: discharge to home in stable condition    Final diagnosis, treatment plan, and follow-up recommendations were discussed and explained to the patient. The patient was given an opportunity to ask questions concerning the diagnosis and treatment plan. The patient acknowledged understanding of the diagnosis, treatment, and follow-up recommendations. The patient was advised to seek urgent care upon discharge if worsening symptoms develop prior to scheduled follow-up. Time spent on discharge included time with the patient, and also coordinating discharge care as outlined below.    Total time spent: 50 min CHIEF COMPLAINT/DIAGNOSIS: sob, weakness     HPI: 89 yo F with pmhx of afib on coumadin, HTN, Hyperlipidemia, hypothyroidism, HFpEF, pleural effusions treated with diuresis now presenting with complaints of sob and weakness. She was found to have a left sided pleural effusion on an outpatient CXR. Endorses that her Lasix was temporarily held as it was initially thought her ssx were related to dehydration. She has since been restarted on Lasix 40 as opposed to Lasix 20 po qd and spironolactone was also added.     ED was found to have mod to large left sided pleural effusion. Was not found to be hypoxic on admission. +B/L LE edema  Otherwise denies cp. No N/V/D. No hemoptysis. TNI 0.07. No ekg changes. Na 129. INR 4.9 with no e/o bleeding     ASSESSMENT AND PLAN:    89 y/o female w/ PMHx significant for HFpEF, afib p/w sob and weakness    1) Acute on chronic diastolic HF  - tele monitoring. tele review as above. afib rate controlled  - s/p IV lasix -- cont. Lasix 40mg QD  - daily weights, 10lb weight loss noted | I&Os  - Cont. BB  - echo 10/2020: EF 70%, severe TR, mod pulm htn  - Nutrition cx for HF education  - BiPAP HS, supplemental 02 - 02 eval not requiring home 02   - Cardio, Pulm f/u appreciated   11/2: repeat CXR improved, check orthostatics > negative     2) Chronic Afib | Supratherapeutic INR - resolved  - INR daily. Goal 2-3  - Cont. Coumadin, BB  - d/c CCB - due to low sbp, rate controlled w/ BB    3) Hypotension  - check orthostatics - neg  - start low dose midodrine. monitor.  - cardio f/u appreciated -- d/c on low dose midodrine. f/u outpatient. patient/son advised to monitor bp at home    4) Hypothyroidism   - TFTs reviewed. Synthroid reduced to 100mcg  - f/u pcp outpatient     5) HLD  - cont. statin    6) Nutrition eval for manlutrition     7) DVT ppx  - coumadin    Full Code. See GOC note.    Dispo: home with home PT today. f/u pcp, pulm and cardio.      Dispo: discharge to home in stable condition    Final diagnosis, treatment plan, and follow-up recommendations were discussed and explained to the patient. The patient was given an opportunity to ask questions concerning the diagnosis and treatment plan. The patient acknowledged understanding of the diagnosis, treatment, and follow-up recommendations. The patient was advised to seek urgent care upon discharge if worsening symptoms develop prior to scheduled follow-up. Time spent on discharge included time with the patient, and also coordinating discharge care as outlined below.    Total time spent: 50 min

## 2020-11-02 NOTE — DISCHARGE NOTE PROVIDER - NSDCCPTREATMENT_GEN_ALL_CORE_FT
PRINCIPAL PROCEDURE  Procedure: Daily weight  Findings and Treatment: Continue to weigh yourself once a day. Managing your weight is very important! Keep a log of your weight in a notebook. In you experience an increase of 3 or more pounds in 1 to 2 days or 5 or more pounds in a week call your cardiologist or primary care provider.      SECONDARY PROCEDURE  Procedure: Provide education packet about heart healthy diet  Findings and Treatment: Choose foods that are low in salt - examples include: fresh meats, poultry, fish, eggs, milk and yogurt. Avoid packaged/processed foods and excessive table salt use. Too much fluid can make your heart failure worse. Have 4 to 6 cups of liquid per day (coffee, tea, soups, water) Keep in mind your liquid intake when eating foods that are liquid at room temperature.

## 2020-11-02 NOTE — PROGRESS NOTE ADULT - SUBJECTIVE AND OBJECTIVE BOX
Patient is a 89y old  Female who presents with a chief complaint of sob/weakness (27 Oct 2020 13:02)      HPI:      89 yo F with pmh afib on coumadin, HTN, Hyperlipidemia, hypothyroidism, h/o HFpEF, h/o pleural effusions treated with diuresis now presenting with complaints of sob and weakness. She was found to have a left sided pleural effusion on an o/p CXR. Endorses that her lasix was temporarily held as it was initially thought her ssx were related to dehydration. She has since been restarted on lasix 40 as opposed to lasix 20 po qd and spironolactone was also added.   ED was found to have mod to large left sided pleural effusion. Was not found to be hypoxic on admission. +B/L LE edema  Otherwise denies cp. No N/V/D. No hemoptysis. TNI 0.07. No ekg changes. Na 129. INR 4.9 with no e/o bleeding     10/29  No acute pulmonary events occurred overnight  Discussed plan with son, Pbalo    10/30  No acute pulmonary events occurred overnight    10/31  uneventful overnight  no distress  11/1/  resting comfortably  no resp distress  PREVIOUS DIAGNOSTIC TESTING:      MEDICATIONS  (STANDING):  aspirin  chewable 81 milliGRAM(s) Oral daily  atorvastatin 20 milliGRAM(s) Oral at bedtime  diltiazem    milliGRAM(s) Oral daily  furosemide   Injectable 40 milliGRAM(s) IV Push daily  levothyroxine 112 MICROGram(s) Oral daily  melatonin 5 milliGRAM(s) Oral at bedtime  metoprolol tartrate 25 milliGRAM(s) Oral two times a day    MEDICATIONS  (PRN):  acetaminophen   Tablet .. 650 milliGRAM(s) Oral every 4 hours PRN Mild Pain (1 - 3)  aluminum hydroxide/magnesium hydroxide/simethicone Suspension 30 milliLiter(s) Oral every 4 hours PRN Dyspepsia  ondansetron Injectable 4 milliGRAM(s) IV Push every 6 hours PRN Nausea      FAMILY HISTORY:    Vital Signs Last 24 Hrs  T(C): 36.8 (31 Oct 2020 20:56), Max: 36.8 (31 Oct 2020 20:56)  T(F): 98.3 (31 Oct 2020 20:56), Max: 98.3 (31 Oct 2020 20:56)  HR: 83 (31 Oct 2020 20:56) (76 - 83)  BP: 109/64 (31 Oct 2020 20:56) (101/47 - 109/64)  BP(mean): --  RR: --  SpO2: 97% (31 Oct 2020 20:56) (97% - 98%)  PHYSICAL EXAM  General Appearance: cooperative, no acute distress,   HEENT: PERRL, conjunctiva clear, EOM's intact, non injected pharynx, no exudate, TM   normal  Neck: Supple, , no adenopathy, thyroid: not enlarged, no carotid bruit or JVD  Back: Symmetric, no  tenderness,no soft tissue tenderness  Lungs: diminished bilaterally  Heart: Regular rate and rhythm, S1, S2 normal, no murmur, rub or gallop  Abdomen: Soft, non-tender, bowel sounds active , no hepatosplenomegaly  Extremities: +GAVINO  Skin: Skin color, texture normal, no rashes   Neurologic: Alert and oriented X3 , cranial nerves intact, sensory and motor normal,    ECG:    LABS:                          13.4   8.34  )-----------( 167      ( 27 Oct 2020 11:11 )             40.4     10-28    136  |  101  |  17  ----------------------------<  94  4.3   |  33<H>  |  0.77    Ca    8.9      28 Oct 2020 07:47  Mg     2.0     10-27    TPro  7.2  /  Alb  3.0<L>  /  TBili  1.1  /  DBili  x   /  AST  30  /  ALT  19  /  AlkPhos  62  10-27    CARDIAC MARKERS ( 27 Oct 2020 18:06 )  <0.015 ng/mL / x     / x     / x     / x      CARDIAC MARKERS ( 27 Oct 2020 14:52 )  <0.015 ng/mL / x     / x     / x     / x            Pro BNP  9443 10-27 @ 11:11  D Dimer  -- 10-27 @ 11:11    PT/INR - ( 28 Oct 2020 07:47 )   PT: 54.3 sec;   INR: 5.01 ratio         PTT - ( 27 Oct 2020 11:11 )  PTT:40.6 sec          RADIOLOGY & ADDITIONAL STUDIES:

## 2020-11-02 NOTE — DISCHARGE NOTE PROVIDER - NSDCCAREPROVSEEN_GEN_ALL_CORE_FT
Susana Dodson (Nurse Practitioner)    Susana Dodson (Nurse Practitioner)   Mason Aj (Cardiologist)  Neto Barber (Pulmonologist)

## 2020-11-03 ENCOUNTER — TRANSCRIPTION ENCOUNTER (OUTPATIENT)
Age: 85
End: 2020-11-03

## 2020-11-03 VITALS
HEART RATE: 80 BPM | RESPIRATION RATE: 18 BRPM | OXYGEN SATURATION: 98 % | TEMPERATURE: 98 F | DIASTOLIC BLOOD PRESSURE: 68 MMHG | SYSTOLIC BLOOD PRESSURE: 105 MMHG

## 2020-11-03 LAB
ANION GAP SERPL CALC-SCNC: 4 MMOL/L — LOW (ref 5–17)
APTT BLD: 34 SEC — SIGNIFICANT CHANGE UP (ref 27.5–35.5)
BUN SERPL-MCNC: 23 MG/DL — SIGNIFICANT CHANGE UP (ref 7–23)
CALCIUM SERPL-MCNC: 9 MG/DL — SIGNIFICANT CHANGE UP (ref 8.5–10.1)
CHLORIDE SERPL-SCNC: 105 MMOL/L — SIGNIFICANT CHANGE UP (ref 96–108)
CO2 SERPL-SCNC: 30 MMOL/L — SIGNIFICANT CHANGE UP (ref 22–31)
CREAT SERPL-MCNC: 0.7 MG/DL — SIGNIFICANT CHANGE UP (ref 0.5–1.3)
GLUCOSE SERPL-MCNC: 91 MG/DL — SIGNIFICANT CHANGE UP (ref 70–99)
INR BLD: 1.98 RATIO — HIGH (ref 0.88–1.16)
POTASSIUM SERPL-MCNC: 4.2 MMOL/L — SIGNIFICANT CHANGE UP (ref 3.5–5.3)
POTASSIUM SERPL-SCNC: 4.2 MMOL/L — SIGNIFICANT CHANGE UP (ref 3.5–5.3)
PROTHROM AB SERPL-ACNC: 22.4 SEC — HIGH (ref 10.6–13.6)
SODIUM SERPL-SCNC: 139 MMOL/L — SIGNIFICANT CHANGE UP (ref 135–145)

## 2020-11-03 PROCEDURE — 99239 HOSP IP/OBS DSCHRG MGMT >30: CPT

## 2020-11-03 RX ORDER — MIDODRINE HYDROCHLORIDE 2.5 MG/1
1 TABLET ORAL
Qty: 90 | Refills: 0
Start: 2020-11-03 | End: 2020-12-02

## 2020-11-03 RX ADMIN — MIDODRINE HYDROCHLORIDE 2.5 MILLIGRAM(S): 2.5 TABLET ORAL at 06:36

## 2020-11-03 RX ADMIN — MIDODRINE HYDROCHLORIDE 2.5 MILLIGRAM(S): 2.5 TABLET ORAL at 12:21

## 2020-11-03 RX ADMIN — Medication 100 MICROGRAM(S): at 06:36

## 2020-11-03 RX ADMIN — Medication 40 MILLIGRAM(S): at 09:40

## 2020-11-03 RX ADMIN — Medication 81 MILLIGRAM(S): at 09:40

## 2020-11-03 RX ADMIN — Medication 25 MILLIGRAM(S): at 09:40

## 2020-11-03 NOTE — DISCHARGE NOTE NURSING/CASE MANAGEMENT/SOCIAL WORK - PATIENT PORTAL LINK FT
You can access the FollowMyHealth Patient Portal offered by [facility] by registering at the following website: [email or web address]. By joining [facility]’s FollowMyHealth portal, you will also be able to view your health information using other applications (apps) compatible with our system.

## 2020-11-03 NOTE — PROGRESS NOTE ADULT - SUBJECTIVE AND OBJECTIVE BOX
The patient was seen and examined. No acute events overnight.  No chest pain.  Improved shortness of breath.  No CP  HR stable  BP improved on midodrine      _____________________________  ELIZABETH LACY is a 89y year old Female with a past medical history of chronic diastolic heart failure, chronic atrial fibrillation on anticoagulation with warfarin, hypertension, hyperlipidemia, moderate mitral valve regurgitation, moderate stenosis, with severe pulmonary hypertension who was hospitalized last year for heart failure and responded to diuretics.  The patient was seen in the office on 26, with complaints of shortness of breath, and advised to go to the ER given her symptoms of shortness of breath and pleural effusions on chest x-ray.  She declined.     She continued to feel short of breath and subsequently was sent to the ER.  She was admitted for acute diastolic heart failure.  Currently she is stable on oxygen without chest pain, or palpitations.  No dizziness or lightheadedness.    PREVIOUS CARDIAC WORKUP:    Echocardiogram: October 26, 2020-  --There is severe left atrial dilatation (LA volume index 65 ml/m²).  --There is mild left ventricular hypertrophy.  --LV global wall motion is hypokinetic.  --LV ejection fraction (50 %) is borderline normal.  --Left ventricular ejection fraction 50 to 55%. There is asymmetric apical hypertrophy present.  --There is severe right atrial dilatation.  --The aortic root is normal in size (3.40 cm). Ascending aorta is dilated; its diameter is 3.90   cm (2.6 cm/m²).  --The aortic valve is moderately calcified. There is moderate aortic stenosis. The aortic valve   area, by the continuity equation, is 1.03 cm². There is mild aortic regurgitation.  --There is mild to moderate mitral regurgitation.  --There is moderate to severe tricuspid regurgitation.  --There is mild pulmonic regurgitation.  --The right atrial pressure is elevated (11-20 mm Hg). There is severe pulmonary hypertension.  --There is a trace pericardial effusion. Left pleural effusion is present. Right pleural   effusion is present.     ________________________________  Review of systems: A 10 point review of system has been performed, and is negative except for what has been mentioned in the above history of present illness.     PAST MEDICAL & SURGICAL HISTORY:  Hypothyroid    Hyperlipidemia    HTN (hypertension)    AF (atrial fibrillation)         Home Medications:  Centrum Silver oral tablet: 1 tab(s) orally once a day (27 Oct 2020 13:23)  furosemide 20 mg oral tablet: 2 tab(s) orally 2 times a day (27 Oct 2020 13:23)  levothyroxine 112 mcg (0.112 mg) oral tablet: 1 tab(s) orally once a day (27 Oct 2020 13:23)  lisinopril 2.5 mg oral tablet: 0.5 tab(s) orally once a day (27 Oct 2020 13:23)  Melatonin 3 mg oral tablet: 2 tab(s) orally once (at bedtime) (27 Oct 2020 13:23)  Metoprolol Tartrate 25 mg oral tablet: 1 tab(s) orally 2 times a day (27 Oct 2020 13:23)  pravastatin 20 mg oral tablet: 1 tab(s) orally once a day (at bedtime) (27 Oct 2020 13:23)  warfarin 2 mg oral tablet: 1 tab(s) orally once a day (27 Oct 2020 13:23)     MEDICATIONS  (STANDING):  aspirin  chewable 81 milliGRAM(s) Oral daily  atorvastatin 20 milliGRAM(s) Oral at bedtime  furosemide    Tablet 40 milliGRAM(s) Oral daily  levothyroxine 100 MICROGram(s) Oral daily  melatonin 5 milliGRAM(s) Oral at bedtime  metoprolol succinate ER 25 milliGRAM(s) Oral daily  midodrine. 2.5 milliGRAM(s) Oral three times a day    MEDICATIONS  (PRN):  acetaminophen   Tablet .. 650 milliGRAM(s) Oral every 4 hours PRN Mild Pain (1 - 3)  aluminum hydroxide/magnesium hydroxide/simethicone Suspension 30 milliLiter(s) Oral every 4 hours PRN Dyspepsia  ondansetron Injectable 4 milliGRAM(s) IV Push every 6 hours PRN Nausea      Vital Signs Last 24 Hrs  T(C): 36.6 (03 Nov 2020 07:55), Max: 36.7 (02 Nov 2020 21:04)  T(F): 97.9 (03 Nov 2020 07:55), Max: 98.1 (02 Nov 2020 21:04)  HR: 80 (03 Nov 2020 07:55) (62 - 83)  BP: 105/68 (03 Nov 2020 07:55) (89/54 - 119/72)  BP(mean): --  RR: 18 (03 Nov 2020 07:55) (16 - 18)  SpO2: 98% (03 Nov 2020 07:55) (95% - 99%)  I&O's Summary    02 Nov 2020 07:01  -  03 Nov 2020 07:00  --------------------------------------------------------  IN: 0 mL / OUT: 300 mL / NET: -300 mL        ________________________________  GENERAL APPEARANCE:  No acute distress  HEAD: normocephalic, atraumatic  NECK: supple, + jugular venous distention, no carotid bruit    HEART: Irregularly irregular, S1, S2 normal, 2/6 regurgitant murmur    CHEST:  No anterior chest wall tenderness    LUNGS: Coarse with decreased breath sounds at bases    ABDOMEN: soft, nontender, nondistended, with positive bowel sounds appreciated  EXTREMITIES: No clubbing bilateral extremity edema  NEURO: Alert and oriented x3  PSYC:  Normal affect  SKIN:  Dry  ________________________________   TELEMETRY: Rate controlled A. fib    ECG: Per my interpretation, rate controlled atrial fibrillation with nonspecific changes.    LABS:                         12.7   7.05  )-----------( 232      ( 02 Nov 2020 07:34 )             39.4          11-03    139  |  105  |  23  ----------------------------<  91  4.2   |  30  |  0.70    Ca    9.0      03 Nov 2020 07:12          PT/INR - ( 03 Nov 2020 07:12 )   PT: 22.4 sec;   INR: 1.98 ratio         PTT - ( 03 Nov 2020 07:12 )  PTT:34.0 sec        ________________________________    RADIOLOGY & ADDITIONAL STUDIES:  IMPRESSION:  Moderate bilateral pleural effusions, right side greater than left.  Upper abdominal ascites.    ________________________________    ASSESSMENT:    Acute on chronic diastolic heart failure  Pleural Effusion on CT-similar to prior presentation  Chronic atrial fibrillation on anticoagulation  Supratherapeutic INR  Chronic venous insufficiency  Hypertension  Hyperlipidemia  Pulmonary hypertension valvular heart disease with aortic stenosis (aortic valve area of 1.03) and moderate mitral valve regurgitation        PLAN:  In summary, this is a 89-year-old female with a past medical history of valvular heart disease, chronic diastolic heart failure and atrial fibrillation admitted for acute diastolic heart failure.    Continue oral Lasix.  Heart rate stable on current dose of metoprolol.  BP stable, cont ProAmatine.  Continue warfarin.  Continue aspirin.  Hemoglobin stable.  No need to resume Cardizem as heart rate is been stable.  No need to resume ACE inhibitor as blood pressure has been low.  Discharge planning per primary.  __________________________________________________________________________  Thank you for allowing me to participate in the care of your patient. Please contact me should any questions arise.    ALFONZO Ayala DO, [facility]  [phone]

## 2020-11-03 NOTE — PROGRESS NOTE ADULT - SUBJECTIVE AND OBJECTIVE BOX
CHIEF COMPLAINT/DIAGNOSIS: sob, weakness     HPI: 89 yo F with pmhx of afib on coumadin, HTN, Hyperlipidemia, hypothyroidism, HFpEF, pleural effusions treated with diuresis now presenting with complaints of sob and weakness. She was found to have a left sided pleural effusion on an outpatient CXR. Endorses that her Lasix was temporarily held as it was initially thought her ssx were related to dehydration. She has since been restarted on Lasix 40 as opposed to Lasix 20 po qd and spironolactone was also added.     ED was found to have mod to large left sided pleural effusion. Was not found to be hypoxic on admission. +B/L LE edema  Otherwise denies cp. No N/V/D. No hemoptysis. TNI 0.07. No ekg changes. Na 129. INR 4.9 with no e/o bleeding     11/3: feeling better, denies sob. breathing has improved.     REVIEW OF SYSTEMS:  All other review of systems is negative unless indicated above    PHYSICAL EXAM:  Constitutional: Awake and alert, cachectic   HEENT: Normal Hearing, MMM  Neck: Soft and supple, No LAD, No JVD  Respiratory: Breath sounds are clear crackles b/l   Cardiovascular: S1 and S2, IRIR  Gastrointestinal: Bowel Sounds present, soft, nontender, nondistended, no guarding, no rebound  Extremities: No peripheral edema  Vascular: 2+ peripheral pulses  Neurological: A/O x 3, no focal deficits  Musculoskeletal: 5/5 strength b/l upper and lower extremities  Skin: No rashes    Vital Signs Last 24 Hrs  T(C): 36.6 (03 Nov 2020 07:55), Max: 36.7 (02 Nov 2020 21:04)  T(F): 97.9 (03 Nov 2020 07:55), Max: 98.1 (02 Nov 2020 21:04)  HR: 80 (03 Nov 2020 07:55) (62 - 83)  BP: 105/68 (03 Nov 2020 07:55) (89/54 - 119/72)  BP(mean): --  RR: 18 (03 Nov 2020 07:55) (16 - 18)  SpO2: 98% (03 Nov 2020 07:55) (95% - 99%)    LABS: All Labs Reviewed:    11-03    139  |  105  |  23  ----------------------------<  91  4.2   |  30  |  0.70    Ca    9.0      03 Nov 2020 07:12      PT/INR - ( 03 Nov 2020 07:12 )   PT: 22.4 sec;   INR: 1.98 ratio         PTT - ( 03 Nov 2020 07:12 )  PTT:34.0 sec    RADIOLOGY:  < from: Xray Chest 1 View-PORTABLE IMMEDIATE (Xray Chest 1 View-PORTABLE IMMEDIATE .) (10.30.20 @ 08:43) >  IMPRESSION: Persistent CHF.  < end of copied text >    < from: CT Chest No Cont (10.27.20 @ 12:43) >  IMPRESSION:  Moderate bilateral pleural effusions, right side greater than left.  Upper abdominal ascites.  < end of copied text >    ECHOCARDIOGRAM:  < from: TTE Echo Complete w/o Contrast w/ Doppler (10.28.20 @ 10:27) >   Summary     Fibrocalcific changes noted to the mitral valve leaflets with preserved   leaflet excursion.   Mild to Moderate mitral regurgitation is present.   Significantfibrocalcific changes noted to the aortic valve leaflets with   restriction in leaflet excursion. Mild aortic stenosis.   Trace aortic regurgitation is present.   The tricuspid valve leaflets appear mildly thickened and/or calcified, but   open well.   Severe (4+) tricuspid valve regurgitation is present. Moderate pulmonary   hypertension.   Mild pulmonic valvular regurgitation (1+) is present.   The left atrium appears moderately dilated.   Severe apical left ventricular hypertrophy is present.   Estimated left ventricular ejection fraction is >70 %.   Apically displaced papillary muscle.   The right ventricular apex is not well visualized.   Cannot rule out right apical hypertrophy   The IVC is dilated with decreased respiratory variation  The right atrium appears moderately to severely dilated.   Pleural effusion - is present.    < end of copied text >    MEDICATIONS  (STANDING):  aspirin  chewable 81 milliGRAM(s) Oral daily  atorvastatin 20 milliGRAM(s) Oral at bedtime  furosemide    Tablet 40 milliGRAM(s) Oral daily  levothyroxine 100 MICROGram(s) Oral daily  melatonin 5 milliGRAM(s) Oral at bedtime  metoprolol succinate ER 25 milliGRAM(s) Oral daily  midodrine. 2.5 milliGRAM(s) Oral three times a day    MEDICATIONS  (PRN):  acetaminophen   Tablet .. 650 milliGRAM(s) Oral every 4 hours PRN Mild Pain (1 - 3)  aluminum hydroxide/magnesium hydroxide/simethicone Suspension 30 milliLiter(s) Oral every 4 hours PRN Dyspepsia  ondansetron Injectable 4 milliGRAM(s) IV Push every 6 hours PRN Nausea    TELEMETRY REVIEW:  11/2: afib 60-80s - d/c tele    ASSESSMENT AND PLAN:    89 y/o female w/ PMHx significant for HFpEF, afib p/w sob and weakness    1) Acute on chronic diastolic HF  - tele monitoring. tele review as above. afib rate controlled  - s/p IV lasix -- cont. Lasix 40mg QD  - daily weights, 10lb weight loss noted | I&Os  - Cont. BB  - echo 10/2020: EF 70%, severe TR, mod pulm htn  - Nutrition cx for HF education  - BiPAP HS, supplemental 02 - 02 eval not requiring home 02   - Cardio, Pulm f/u appreciated   11/2: repeat CXR improved, check orthostatics > negative     2) Chronic Afib | Supratherapeutic INR - resolved  - INR daily. Goal 2-3  - Cont. Coumadin, BB  - d/c CCB - due to low sbp, rate controlled w/ BB    3) Hypotension  - check orthostatics - neg  - start low dose midodrine. monitor.  - cardio f/u appreciated -- d/c on low dose midodrine. f/u outpatient. patient/son advised to monitor bp at home    4) Hypothyroidism   - TFTs reviewed. Synthroid reduced to 100mcg  - f/u pcp outpatient     5) HLD  - cont. statin    6) Nutrition eval for manlutrition     7) DVT ppx  - coumadin    Full Code. See GOC note.    Dispo: home with home PT today. f/u pcp, pulm and cardio. CHIEF COMPLAINT/DIAGNOSIS: sob, weakness     HPI: 89 yo F with pmhx of afib on coumadin, HTN, Hyperlipidemia, hypothyroidism, HFpEF, pleural effusions treated with diuresis now presenting with complaints of sob and weakness. She was found to have a left sided pleural effusion on an outpatient CXR. Endorses that her Lasix was temporarily held as it was initially thought her ssx were related to dehydration. She has since been restarted on Lasix 40 as opposed to Lasix 20 po qd and spironolactone was also added.     ED was found to have mod to large left sided pleural effusion. Was not found to be hypoxic on admission. +B/L LE edema  Otherwise denies cp. No N/V/D. No hemoptysis. TNI 0.07. No ekg changes. Na 129. INR 4.9 with no e/o bleeding     11/3: feeling better, denies sob. breathing has improved.     REVIEW OF SYSTEMS:  All other review of systems is negative unless indicated above    PHYSICAL EXAM:  Constitutional: Awake and alert, cachectic   HEENT: Normal Hearing, MMM  Neck: Soft and supple, No LAD, No JVD  Respiratory: Breath sounds are minimal crackles b/l   Cardiovascular: S1 and S2, IRIR  Gastrointestinal: Bowel Sounds present, soft, nontender, nondistended, no guarding, no rebound  Extremities: No peripheral edema  Vascular: 2+ peripheral pulses  Neurological: A/O x 3, no focal deficits  Musculoskeletal: 5/5 strength b/l upper and lower extremities  Skin: No rashes    Vital Signs Last 24 Hrs  T(C): 36.6 (03 Nov 2020 07:55), Max: 36.7 (02 Nov 2020 21:04)  T(F): 97.9 (03 Nov 2020 07:55), Max: 98.1 (02 Nov 2020 21:04)  HR: 80 (03 Nov 2020 07:55) (62 - 83)  BP: 105/68 (03 Nov 2020 07:55) (89/54 - 119/72)  BP(mean): --  RR: 18 (03 Nov 2020 07:55) (16 - 18)  SpO2: 98% (03 Nov 2020 07:55) (95% - 99%)    LABS: All Labs Reviewed:    11-03    139  |  105  |  23  ----------------------------<  91  4.2   |  30  |  0.70    Ca    9.0      03 Nov 2020 07:12      PT/INR - ( 03 Nov 2020 07:12 )   PT: 22.4 sec;   INR: 1.98 ratio         PTT - ( 03 Nov 2020 07:12 )  PTT:34.0 sec    RADIOLOGY:  < from: Xray Chest 1 View-PORTABLE IMMEDIATE (Xray Chest 1 View-PORTABLE IMMEDIATE .) (10.30.20 @ 08:43) >  IMPRESSION: Persistent CHF.  < end of copied text >    < from: CT Chest No Cont (10.27.20 @ 12:43) >  IMPRESSION:  Moderate bilateral pleural effusions, right side greater than left.  Upper abdominal ascites.  < end of copied text >    ECHOCARDIOGRAM:  < from: TTE Echo Complete w/o Contrast w/ Doppler (10.28.20 @ 10:27) >   Summary     Fibrocalcific changes noted to the mitral valve leaflets with preserved   leaflet excursion.   Mild to Moderate mitral regurgitation is present.   Significantfibrocalcific changes noted to the aortic valve leaflets with   restriction in leaflet excursion. Mild aortic stenosis.   Trace aortic regurgitation is present.   The tricuspid valve leaflets appear mildly thickened and/or calcified, but   open well.   Severe (4+) tricuspid valve regurgitation is present. Moderate pulmonary   hypertension.   Mild pulmonic valvular regurgitation (1+) is present.   The left atrium appears moderately dilated.   Severe apical left ventricular hypertrophy is present.   Estimated left ventricular ejection fraction is >70 %.   Apically displaced papillary muscle.   The right ventricular apex is not well visualized.   Cannot rule out right apical hypertrophy   The IVC is dilated with decreased respiratory variation  The right atrium appears moderately to severely dilated.   Pleural effusion - is present.    < end of copied text >    MEDICATIONS  (STANDING):  aspirin  chewable 81 milliGRAM(s) Oral daily  atorvastatin 20 milliGRAM(s) Oral at bedtime  furosemide    Tablet 40 milliGRAM(s) Oral daily  levothyroxine 100 MICROGram(s) Oral daily  melatonin 5 milliGRAM(s) Oral at bedtime  metoprolol succinate ER 25 milliGRAM(s) Oral daily  midodrine. 2.5 milliGRAM(s) Oral three times a day    MEDICATIONS  (PRN):  acetaminophen   Tablet .. 650 milliGRAM(s) Oral every 4 hours PRN Mild Pain (1 - 3)  aluminum hydroxide/magnesium hydroxide/simethicone Suspension 30 milliLiter(s) Oral every 4 hours PRN Dyspepsia  ondansetron Injectable 4 milliGRAM(s) IV Push every 6 hours PRN Nausea    TELEMETRY REVIEW:  11/2: afib 60-80s - d/c tele    ASSESSMENT AND PLAN:    89 y/o female w/ PMHx significant for HFpEF, afib p/w sob and weakness    1) Acute on chronic diastolic HF  - tele monitoring. tele review as above. afib rate controlled  - s/p IV lasix -- cont. Lasix 40mg QD  - daily weights, 10lb weight loss noted | I&Os  - Cont. BB  - echo 10/2020: EF 70%, severe TR, mod pulm htn  - Nutrition cx for HF education  - BiPAP HS, supplemental 02 - 02 eval not requiring home 02   - Cardio, Pulm f/u appreciated   11/2: repeat CXR improved, check orthostatics > negative     2) Chronic Afib | Supratherapeutic INR - resolved  - INR daily. Goal 2-3  - Cont. Coumadin, BB  - d/c CCB - due to low sbp, rate controlled w/ BB    3) Hypotension  - check orthostatics - neg  - start low dose midodrine. monitor.  - cardio f/u appreciated -- d/c on low dose midodrine. f/u outpatient. patient/son advised to monitor bp at home    4) Hypothyroidism   - TFTs reviewed. Synthroid reduced to 100mcg  - f/u pcp outpatient     5) HLD  - cont. statin    6) Nutrition eval for manlutrition     7) DVT ppx  - coumadin    Full Code. See GOC note.    Dispo: home with home PT today. f/u pcp, pulm and cardio.

## 2020-11-03 NOTE — PROGRESS NOTE ADULT - REASON FOR ADMISSION
sob/weakness

## 2020-11-04 DIAGNOSIS — E03.9 HYPOTHYROIDISM, UNSPECIFIED: ICD-10-CM

## 2020-11-04 RX ORDER — PRAVASTATIN SODIUM 20 MG/1
20 TABLET ORAL
Refills: 0 | Status: ACTIVE | COMMUNITY
Start: 2020-11-04

## 2020-11-04 RX ORDER — GLUCOSAMINE HCL/CHONDROITIN SU 500-400 MG
3 CAPSULE ORAL AT BEDTIME
Refills: 0 | Status: ACTIVE | COMMUNITY
Start: 2020-11-04

## 2020-11-04 RX ORDER — WARFARIN 2 MG/1
2 TABLET ORAL DAILY
Refills: 0 | Status: ACTIVE | COMMUNITY
Start: 2020-11-04

## 2020-11-04 RX ORDER — METOPROLOL SUCCINATE 25 MG/1
25 TABLET, EXTENDED RELEASE ORAL DAILY
Refills: 0 | Status: ACTIVE | COMMUNITY
Start: 2020-11-04

## 2020-11-04 RX ORDER — FUROSEMIDE 40 MG/1
40 TABLET ORAL DAILY
Refills: 0 | Status: ACTIVE | COMMUNITY
Start: 2020-11-04

## 2020-11-04 RX ORDER — LEVOTHYROXINE SODIUM 0.1 MG/1
100 TABLET ORAL DAILY
Refills: 0 | Status: ACTIVE | COMMUNITY
Start: 2020-11-04

## 2020-11-04 RX ORDER — ASPIRIN 81 MG/1
81 TABLET ORAL DAILY
Refills: 0 | Status: ACTIVE | COMMUNITY
Start: 2020-11-04

## 2020-11-04 RX ORDER — MIDODRINE HYDROCHLORIDE 2.5 MG/1
2.5 TABLET ORAL 3 TIMES DAILY
Refills: 0 | Status: ACTIVE | COMMUNITY
Start: 2020-11-04

## 2020-11-04 RX ORDER — MULTIVIT-MIN/FA/LYCOPEN/LUTEIN .4-300-25
TABLET ORAL DAILY
Refills: 0 | Status: ACTIVE | COMMUNITY
Start: 2020-11-04

## 2020-11-05 NOTE — H&P ADULT - NSHPREVIEWOFSYSTEMS_GEN_ALL_CORE
From: Kendlal Mayer  To: Daquan Cagle DO  Sent: 11/5/2020 8:31 AM EST  Subject: Prescription Question    I tested positive for covid on monday 11/2 at [facility] urgent care. My stomach has been really [place] Blush and I thought it would pass but it has not. Would I be able to get some zofran or something to help with the nausea?
REVIEW OF SYSTEMS:    CONSTITUTIONAL: No weakness, fevers or chills  EYES/ENT: No visual changes;  No vertigo or throat pain   NECK: No pain or stiffness  RESPIRATORY: + shortness of breath  CARDIOVASCULAR: No chest pain or palpitations  GASTROINTESTINAL: No abdominal or epigastric pain. No nausea, vomiting, or hematemesis; No diarrhea or constipation. No melena or hematochezia.  GENITOURINARY: No dysuria, frequency or hematuria  NEUROLOGICAL: No numbness or weakness  SKIN: No itching, burning, rashes, or lesions   All other review of systems is negative unless indicated above

## 2020-11-06 DIAGNOSIS — Z66 DO NOT RESUSCITATE: ICD-10-CM

## 2020-11-06 DIAGNOSIS — I87.2 VENOUS INSUFFICIENCY (CHRONIC) (PERIPHERAL): ICD-10-CM

## 2020-11-06 DIAGNOSIS — Z88.1 ALLERGY STATUS TO OTHER ANTIBIOTIC AGENTS STATUS: ICD-10-CM

## 2020-11-06 DIAGNOSIS — I08.0 RHEUMATIC DISORDERS OF BOTH MITRAL AND AORTIC VALVES: ICD-10-CM

## 2020-11-06 DIAGNOSIS — E78.5 HYPERLIPIDEMIA, UNSPECIFIED: ICD-10-CM

## 2020-11-06 DIAGNOSIS — I95.9 HYPOTENSION, UNSPECIFIED: ICD-10-CM

## 2020-11-06 DIAGNOSIS — Z79.01 LONG TERM (CURRENT) USE OF ANTICOAGULANTS: ICD-10-CM

## 2020-11-06 DIAGNOSIS — I11.0 HYPERTENSIVE HEART DISEASE WITH HEART FAILURE: ICD-10-CM

## 2020-11-06 DIAGNOSIS — I50.33 ACUTE ON CHRONIC DIASTOLIC (CONGESTIVE) HEART FAILURE: ICD-10-CM

## 2020-11-06 DIAGNOSIS — E03.9 HYPOTHYROIDISM, UNSPECIFIED: ICD-10-CM

## 2020-11-06 DIAGNOSIS — I48.20 CHRONIC ATRIAL FIBRILLATION, UNSPECIFIED: ICD-10-CM

## 2020-11-06 DIAGNOSIS — J96.00 ACUTE RESPIRATORY FAILURE, UNSPECIFIED WHETHER WITH HYPOXIA OR HYPERCAPNIA: ICD-10-CM

## 2020-11-06 DIAGNOSIS — I27.20 PULMONARY HYPERTENSION, UNSPECIFIED: ICD-10-CM

## 2020-11-13 ENCOUNTER — NON-APPOINTMENT (OUTPATIENT)
Age: 85
End: 2020-11-13

## 2020-11-23 ENCOUNTER — APPOINTMENT (OUTPATIENT)
Dept: CARE COORDINATION | Facility: HOME HEALTH | Age: 85
End: 2020-11-23
Payer: MEDICARE

## 2020-11-23 VITALS
RESPIRATION RATE: 18 BRPM | OXYGEN SATURATION: 96 % | HEART RATE: 72 BPM | DIASTOLIC BLOOD PRESSURE: 72 MMHG | SYSTOLIC BLOOD PRESSURE: 128 MMHG

## 2020-11-23 DIAGNOSIS — I48.91 UNSPECIFIED ATRIAL FIBRILLATION: ICD-10-CM

## 2020-11-23 DIAGNOSIS — E78.5 HYPERLIPIDEMIA, UNSPECIFIED: ICD-10-CM

## 2020-11-23 DIAGNOSIS — I95.9 HYPOTENSION, UNSPECIFIED: ICD-10-CM

## 2020-11-23 DIAGNOSIS — I50.30 UNSPECIFIED DIASTOLIC (CONGESTIVE) HEART FAILURE: ICD-10-CM

## 2020-11-23 PROCEDURE — 99349 HOME/RES VST EST MOD MDM 40: CPT

## 2020-11-23 NOTE — PHYSICAL EXAM
[No Acute Distress] : no acute distress [Well Developed] : well developed [Normal Sclera/Conjunctiva] : normal sclera/conjunctiva [Normal Outer Ear/Nose] : the outer ears and nose were normal in appearance [Normal Oropharynx] : the oropharynx was normal [Supple] : supple [No Respiratory Distress] : no respiratory distress  [Clear to Auscultation] : lungs were clear to auscultation bilaterally [No Accessory Muscle Use] : no accessory muscle use [Normal Rate] : normal rate  [Regular Rhythm] : with a regular rhythm [Normal S1, S2] : normal S1 and S2 [Pedal Pulses Present] : the pedal pulses are present [Soft] : abdomen soft [Non Tender] : non-tender [Non-distended] : non-distended [Normal Bowel Sounds] : normal bowel sounds [Grossly Normal Strength/Tone] : grossly normal strength/tone [No Rash] : no rash [Coordination Grossly Intact] : coordination grossly intact [No Focal Deficits] : no focal deficits [Memory Grossly Normal] : memory grossly normal [Normal Affect] : the affect was normal [de-identified] : A&Ox2-3 forgetful, frail appearing [de-identified] : 2-6 systolic murmur [de-identified] : BLE trace ankle edema, diminished pedal pulses, onychomycosis x 10, hemosiderin staining BLE [de-identified] : dry skin BLE [de-identified] : CN 2-12 grossly intact

## 2020-11-23 NOTE — HISTORY OF PRESENT ILLNESS
[Post-hospitalization from ___ Hospital] : Post-hospitalization from [unfilled] Hospital [Admitted on: ___] : The patient was admitted on [unfilled] [Discharged on ___] : discharged on [unfilled] [FreeTextEntry2] : \par  Discharge Note Provider [Charted Location: [facility] [address] [address]] [Authored: 02-Nov-2020 14:59]- for Visit: [id number], Complete, Appended Only, Signed in Full, General\par \par \par Hospital Course \par Discharge Date	03-Nov-2020\par Admission Date	27-Oct-2020 12:58\par Reason for Admission	sob/weakness\par Hospital Course	\par CHIEF COMPLAINT/DIAGNOSIS: sob, weakness \par \par HPI: 89 yo F with pmhx of afib on coumadin, HTN, Hyperlipidemia, hypothyroidism, HFpEF, pleural effusions treated with diuresis now presenting with complaints of sob and weakness. She was found to have a left sided pleural effusion on an outpatient CXR. Endorses that her Lasix was temporarily held as it was initially thought her ssx were related to dehydration. She has since been restarted on Lasix 40 as opposed to Lasix 20 po qd and spironolactone was also added. \par \par ED was found to have mod to large left sided pleural effusion. Was not found to be hypoxic on admission. +B/L LE edema\par Otherwise denies cp. No N/V/D. No hemoptysis. TNI 0.07. No ekg changes. Na 129. INR 4.9 with no e/o bleeding \par \par ASSESSMENT AND PLAN:\par \par 89 y/o female w/ PMHx significant for HFpEF, afib p/w sob and weakness\par \par 1) Acute on chronic diastolic HF\par - tele monitoring. tele review as above. afib rate controlled\par - s/p IV lasix -- cont. Lasix 40mg QD\par - daily weights, 10lb weight loss noted | I&Os\par - Cont. BB\par - echo 10/2020: EF 70%, severe TR, mod pulm htn\par - Nutrition cx for HF education\par - BiPAP HS, supplemental 02 - 02 eval not requiring home 02 \par - Cardio, Pulm f/u appreciated \par 11/2: repeat CXR improved, check orthostatics > negative \par \par 2) Chronic Afib | Supratherapeutic INR - resolved\par - INR daily. Goal 2-3\par - Cont. Coumadin, BB\par - d/c CCB - due to low sbp, rate controlled w/ BB\par \par 3) Hypotension\par - check orthostatics - neg\par - start low dose midodrine. monitor.\par - cardio f/u appreciated -- d/c on low dose midodrine. f/u outpatient. patient/son advised to monitor bp at home\par \par 4) Hypothyroidism \par - TFTs reviewed. Synthroid reduced to 100mcg\par - f/u pcp outpatient \par \par 5) HLD\par - cont. statin\par \par 6) Nutrition eval for manlutrition \par \par 7) DVT ppx\par - coumadin\par \par Full Code. See GOC note.\par \par Dispo: home with home PT today. f/u pcp, pulm and cardio.\par \par \par Dispo: discharge to home in stable condition\par \par Final diagnosis, treatment plan, and follow-up recommendations were discussed and explained to the patient. The patient was given an opportunity to ask questions concerning the diagnosis and treatment plan. The patient acknowledged understanding of the diagnosis, treatment, and follow-up recommendations. The patient was advised to seek urgent care upon discharge if worsening symptoms develop prior to scheduled follow-up. Time spent on discharge included time with the patient, and also coordinating discharge care as outlined below.\par \par Total time spent: 50 min\par \par Med Reconciliation \par Medication Reconciliation Status	Admission Reconciliation is Completed\par Discharge Reconciliation is Completed\par Discharge Medications	aspirin 81 mg oral tablet, chewable: 1 tab(s) orally once a day\par Centrum Silver oral tablet: 1 tab(s) orally once a day\par furosemide 40 mg oral tablet: 1 tab(s) orally once a day\par levothyroxine 100 mcg (0.1 mg) oral tablet: 1 tab(s) orally once a day\par Melatonin 3 mg oral tablet: 2 tab(s) orally once (at bedtime)\par metoprolol succinate 25 mg oral tablet, extended release: 1 tab(s) orally once a day\par midodrine 2.5 mg oral tablet: 1 tab(s) orally 3 times a day\par pravastatin 20 mg oral tablet: 1 tab(s) orally once a day (at bedtime)\par warfarin 2 mg oral tablet: 1 tab(s) orally once a day\par ,\par ,\par \par Care Plan/Procedures \par Goal(s)	To get better and follow your care plan as instructed.\par Discharge Diagnoses, Assessment and Plan of Treatment	PRINCIPAL DISCHARGE DIAGNOSIS\par Diagnosis: HF (heart failure)\par Assessment and Plan of Treatment: You were found to have an acute exacerbation of your heart failure. Heart failure is a condition in which the heart can't pump enough blood to meet the body's needs. The weakening of the heart's pumping ability causes blood and fluid to back up into the lungs resulting in the buildup of fluid in the feet, ankles and legs -- called edema, tiredness and shortness of breath. \par - Continue to monitor your weights at home daily\par - Maintain a low sodium and low cholesterol diet \par - Continue to take Lasix 40mg once a day (sent to pharmacy) \par - Metoprolol Succinate 25mg once a day (sent to pharmacy, new medication) \par - STOP taking metoprolol tartrate and Lisinopril \par -  Follow up with your cardiologist Dr. Aj in 1 to 2 week after discharge for further management - Call to make an appointment. \par - Your discharge weight is 102.5lbs. \par **Monitor for the following signs/symptoms: Unrelieved shortness of breath or shortness of breath at rest, unrelieved chest pain or tightness, wheezing, weight gain of 5lbs or more pounds in 1 week or 2-3lbs in 24hours, confusion, dizziness or lightheadedness. If you experience any of these signs/ symptoms please alert your primary care provider/cardiologist or return to the ED if your symptoms are severe**\par \par \par SECONDARY DISCHARGE DIAGNOSES\par Diagnosis: Chronic atrial fibrillation\par Assessment and Plan of Treatment: Atrial fibrillation is a cardiac arrhythmia. An arrhythmia is a problem with the speed or rhythm of the heartbeat. Atrial fibrillation (A Fib) is the most common type of arrhythmia. The cause is a disorder in the heart's electrical system. To manage atrial fibrillation we recommend you continue to follow a heart healthy diet and maintain a healthy weight, do not smoke -- nicotine can cause heart damage and make it more difficult to manage your a-fib, limit alcohol intake and manage other health conditions -- this includes high blood pressure or cholesterol, sleep apnea, diabetes, and other heart conditions. \par - Take medicine as directed and follow your treatment plan\par - STOP taking Cardizem \par - Metoprolol Succinate 25mg once a day (sent to pharmacy, new medication) \par - Continue to take Coumadin for stroke prevention - Follow up with Dr. Aj for INR monitoring outpatient.\par \par Diagnosis: Hypotension\par Assessment and Plan of Treatment: - Continue midodrine 2.5mg three times a day (new medication, sent to pharmacy). Continue to monitor your blood pressure 1 to 2 times a day and keep a log for your cardiologist. \par - Follow up with your cardiologist Dr. Aj in 1 to 2 weeks after discharge for further management - Call to make an appointment.\par \par Diagnosis: Hypothyroidism\par Assessment and Plan of Treatment: - Continue Synthroid 100mcg once a day in the morning before breakfast (dose reduced from home dose, sent to pharmacy). Follow up with Dr. Ring for further management.\par Discharge Procedures, Findings and Treatment	PRINCIPAL PROCEDURE\par Procedure: Daily weight\par Findings and Treatment: Continue to weigh yourself once a day. Managing your weight is very important! Keep a log of your weight in a notebook. In you experience an increase of 3 or more pounds in 1 to 2 days or 5 or more pounds in a week call your cardiologist or primary care provider.\par \par \par SECONDARY PROCEDURE\par Procedure: Provide education packet about heart healthy diet\par Findings and Treatment: Choose foods that are low in salt - examples include: fresh meats, poultry, fish, eggs, milk and yogurt. Avoid packaged/processed foods and excessive table salt use. Too much fluid can make your heart failure worse. Have 4 to 6 cups of liquid per day (coffee, tea, soups, water) Keep in mind your liquid intake when eating foods that are liquid at room temperature.\par \par Follow Up \par Care Providers for Follow up (PCP/Outpatient Provider)	Zaria Ring\par INTERNAL MEDICINE\par [address]\[place] [address]\par Phone: [phone]\par Fax: [phone]\par Follow Up Time: \par \par Mason Aj\par CARDIOVASCULAR DISEASE\par [address]\[place] [address]\par Phone: [phone]\par Fax: [phone]\par Follow Up Time: \par \par Neto Barber\par INTERNAL MEDICINE\par [address]\[place] [address]\par Phone: [phone]\par Fax: [phone]\par Follow Up Time:\par Diet Instructions	Low sodium, Low cholesterol\par Choose foods that are low in salt - examples include: fresh meats, poultry, fish, eggs, milk and yogurt. Avoid packaged/processed foods and excessive table salt use. Too much fluid can make your heart failure worse. Have 4 to 6 cups of liquid per day (coffee, tea, soups, water) Keep in mind your liquid intake when eating foods that are liquid at room temperature.\par Activity	No restrictions\par Additional Instructions	PCP- follow up in 1 week. Bring these papers with you to that appointment so your PCP can request records from your hospital stay.\par \par Quality Measures \par Does the patient have difficulty running errands alone like visiting a doctor’s office or shopping?	Yes\par Conditions at Discharge	Home with home care\par Does the patient have difficulty climbing stairs?	Yes\par Patient Condition	Stable\par Hospice Patient	No\par Cognition: The patient has	No difficulties\par Did the patient present with or suffer from an ischemic stroke, hemorrhagic stroke or TIA during this admission?	No\par Has the patient had an Acute Myocardial Infarction?	No\par Has the patient had a Percutaneous Coronary Intervention?	No\par Tobacco Usage	No\par \par Home Health \par Discharged with Home Health Care Services?	Yes\par Face-To-Face Contact	As certified below, I, or a nurse practitioner or physician assistant working with me, had a face-to-face encounter that meets the physician face-to-face encounter requirements.\par Need for Skilled Services	Medication teaching and assessment  Rehabilitation services  Teaching and training\par Based on the above findings, the following intermittent skilled services are medically necessary home health services:	Nursing  Physical therapy\par Home Bound Status	Fall risk\par Patient Needs Assistance to Leave Residence...\par Attending Certification	My signature below certifies that the above stated patient is homebound and upon completion of the Face-To-Face encounter, has the need for intermittent skilled nursing, physical therapy and/or speech or occupational therapy services in their home for their current diagnosis as outlined in their initial plan of care. These services will continue to be monitored by myself or another physician.\par Encounter Date	03-Nov-2020\par \par Document Complete \par Care Provider Seen in Hospital	Susana Dodson (Nurse Practitioner) \par Mason Aj (Cardiologist)\par Neto Barber (Pulmonologist)\par Physician Section Complete	This document is complete and the patient is ready for discharge.\par For questions about your prescriptions, please call:	[phone]\par Is this contact telephone number correct?	Yes\par \par \par Electronic Signatures for Addendum Section: \par Vidhi Hall) (Signed Addendum 03-Nov-2020 11:59)\par 	Patient seen and examined with nurse West, agree with Discharge plan as above.\par \par Electronic Signatures:\par Susana Dodson (NP)  (Signed 03-Nov-2020 09:56)\par 	Authored: Hospital Course, Med Reconciliation, Care Plan/Procedures, Follow Up, Quality Measures, Home Health, Document Complete\par Vidhi Hall)  (Signed 02-Nov-2020 15:42)\par 	Co-Signer: Hospital Course, Med Reconciliation, Care Plan/Procedures, Follow Up, Quality Measures, Home Health, Document Complete\par \par \par Last Updated: 03-Nov-2020 11:59 by Vidhi Hall)\par \par \par

## 2020-11-23 NOTE — PLAN
[FreeTextEntry1] : A/P\par 1. Diastolic Heart Failure: s/p hospitalization for exacerbation of CHF after her diuretic was stopped for a couple of days for possible dehydration. She was sent to the hospital from her MD's office on 10/26 with SOB/Weakness and founnd to have CHF exacerbation with mod-lg L pleural effusion.  She lost 10lbs during her hospitalization after diuresis with IV lasix. Con't lasix 40mg daily, metoprolol succ 25 qd.\par \par Enforced with patient & her son need for daily weights. Advised to call for an increase of greater than 2 lbs in a day or 5 pounds in a week.\par Adhere to low salt diet and educated patient on foods that should be  avoided such as processed or fast food.\par Limit fluids to 1 liter a day which is 4-5 glasses.\par Continue medications as ordered. \par F/u with card as scheduled and prn\par \par Will call son tomorrow to follow up on weight.\par \par 2. Afib:\par HR stable on metoprolol, con't coumadin for AC\par \par 3. Hypotension:\par BP stable on metoprolol succ/lasix (previously on metoprolol tartrate, lisinopril and Cardizem)\par Con't midodrie 2.5mg q 6am, noon and 5pm. Slow position changes\par \par 4. HLD\par Pravastatin 20mg HS, low fat./chol diet

## 2020-11-23 NOTE — ASSESSMENT
[FreeTextEntry1] : CC:\par Pt is seen at home s/p recent admission for CHF exacerbation. Pt is ambulatory with RW, independent in tranfers\par HPI:\par Pt Is a 89 y/josseline male enrolled in the [facility] program seen at home s/p a recent admission for CHF admitted 10/27-11/3. Pt was contacted by TCM and med rec was done within 48 hours of DC. Upon arrival to patient's home\par \par Upon examination OOB to chair in her living room, A&O x 2 -3, forgetful able to communicate needs. Denies CP, SOB, pain, abd discomfort or difficulty with bowel or bladder. Denies headache or dizziness. She has f/u visits with both cardiology and pulmonology on 11/12/20 accompanied by her son Pablo who lives with her. There were no med changes at that time. PT/INR last checked on Friday 11/20 and as 2.3, it is checked q 2-3 weeks depending on the results per her son at the [facility]. Son states weight between 107-110, has been avoiding salt and mom's appetite has improved and she is eating more overall. She continues on coumadin for her Afib. BP stable on low dose midodrine TID and she continues on pravastatin for her HLD.\par \par Patient's condition d/w son Pablo after visit, educated on CHF. \par

## 2020-11-24 ENCOUNTER — NON-APPOINTMENT (OUTPATIENT)
Age: 85
End: 2020-11-24

## 2020-11-30 ENCOUNTER — NON-APPOINTMENT (OUTPATIENT)
Age: 85
End: 2020-11-30

## 2020-12-03 ENCOUNTER — NON-APPOINTMENT (OUTPATIENT)
Age: 85
End: 2020-12-03

## 2020-12-04 ENCOUNTER — NON-APPOINTMENT (OUTPATIENT)
Age: 85
End: 2020-12-04

## 2020-12-08 ENCOUNTER — NON-APPOINTMENT (OUTPATIENT)
Age: 85
End: 2020-12-08

## 2021-04-17 NOTE — PROGRESS NOTE ADULT - PROVIDER SPECIALTY LIST ADULT
Pulmonology I have personally seen and examined this patient.  I have fully participated in the care of this patient. I have reviewed all pertinent clinical information, including history, physical exam, plan and the Resident’s note and agree except as noted.

## 2021-10-25 ENCOUNTER — INPATIENT (INPATIENT)
Facility: HOSPITAL | Age: 86
LOS: 6 days | Discharge: NURSING, REHAB OR LONG-TERM CARE | DRG: 480 | End: 2021-11-01
Attending: INTERNAL MEDICINE | Admitting: HOSPITALIST
Payer: MEDICARE

## 2021-10-25 VITALS
SYSTOLIC BLOOD PRESSURE: 139 MMHG | TEMPERATURE: 98 F | HEART RATE: 96 BPM | DIASTOLIC BLOOD PRESSURE: 85 MMHG | RESPIRATION RATE: 19 BRPM | OXYGEN SATURATION: 99 % | HEIGHT: 72 IN | WEIGHT: 111.99 LBS

## 2021-10-25 DIAGNOSIS — S72.009A FRACTURE OF UNSPECIFIED PART OF NECK OF UNSPECIFIED FEMUR, INITIAL ENCOUNTER FOR CLOSED FRACTURE: ICD-10-CM

## 2021-10-25 LAB
ALBUMIN SERPL ELPH-MCNC: 4 G/DL — SIGNIFICANT CHANGE UP (ref 3.3–5)
ALP SERPL-CCNC: 90 U/L — SIGNIFICANT CHANGE UP (ref 40–120)
ALT FLD-CCNC: 17 U/L — SIGNIFICANT CHANGE UP (ref 12–78)
ANION GAP SERPL CALC-SCNC: 8 MMOL/L — SIGNIFICANT CHANGE UP (ref 5–17)
APTT BLD: 34.7 SEC — SIGNIFICANT CHANGE UP (ref 27.5–35.5)
AST SERPL-CCNC: 31 U/L — SIGNIFICANT CHANGE UP (ref 15–37)
BASOPHILS # BLD AUTO: 0.05 K/UL — SIGNIFICANT CHANGE UP (ref 0–0.2)
BASOPHILS NFR BLD AUTO: 0.3 % — SIGNIFICANT CHANGE UP (ref 0–2)
BILIRUB SERPL-MCNC: 0.9 MG/DL — SIGNIFICANT CHANGE UP (ref 0.2–1.2)
BUN SERPL-MCNC: 31 MG/DL — HIGH (ref 7–23)
CALCIUM SERPL-MCNC: 9.3 MG/DL — SIGNIFICANT CHANGE UP (ref 8.5–10.1)
CHLORIDE SERPL-SCNC: 103 MMOL/L — SIGNIFICANT CHANGE UP (ref 96–108)
CO2 SERPL-SCNC: 24 MMOL/L — SIGNIFICANT CHANGE UP (ref 22–31)
CREAT SERPL-MCNC: 1.17 MG/DL — SIGNIFICANT CHANGE UP (ref 0.5–1.3)
EOSINOPHIL # BLD AUTO: 0.03 K/UL — SIGNIFICANT CHANGE UP (ref 0–0.5)
EOSINOPHIL NFR BLD AUTO: 0.2 % — SIGNIFICANT CHANGE UP (ref 0–6)
GLUCOSE SERPL-MCNC: 149 MG/DL — HIGH (ref 70–99)
HCT VFR BLD CALC: 36.8 % — SIGNIFICANT CHANGE UP (ref 34.5–45)
HGB BLD-MCNC: 11.7 G/DL — SIGNIFICANT CHANGE UP (ref 11.5–15.5)
IMM GRANULOCYTES NFR BLD AUTO: 0.5 % — SIGNIFICANT CHANGE UP (ref 0–1.5)
INR BLD: 2.8 RATIO — HIGH (ref 0.88–1.16)
LYMPHOCYTES # BLD AUTO: 0.64 K/UL — LOW (ref 1–3.3)
LYMPHOCYTES # BLD AUTO: 4.3 % — LOW (ref 13–44)
MCHC RBC-ENTMCNC: 28.3 PG — SIGNIFICANT CHANGE UP (ref 27–34)
MCHC RBC-ENTMCNC: 31.8 GM/DL — LOW (ref 32–36)
MCV RBC AUTO: 89.1 FL — SIGNIFICANT CHANGE UP (ref 80–100)
MONOCYTES # BLD AUTO: 0.77 K/UL — SIGNIFICANT CHANGE UP (ref 0–0.9)
MONOCYTES NFR BLD AUTO: 5.2 % — SIGNIFICANT CHANGE UP (ref 2–14)
NEUTROPHILS # BLD AUTO: 13.16 K/UL — HIGH (ref 1.8–7.4)
NEUTROPHILS NFR BLD AUTO: 89.5 % — HIGH (ref 43–77)
PLATELET # BLD AUTO: 148 K/UL — LOW (ref 150–400)
POTASSIUM SERPL-MCNC: 4.6 MMOL/L — SIGNIFICANT CHANGE UP (ref 3.5–5.3)
POTASSIUM SERPL-SCNC: 4.6 MMOL/L — SIGNIFICANT CHANGE UP (ref 3.5–5.3)
PROT SERPL-MCNC: 7.6 GM/DL — SIGNIFICANT CHANGE UP (ref 6–8.3)
PROTHROM AB SERPL-ACNC: 31.2 SEC — HIGH (ref 10.6–13.6)
RBC # BLD: 4.13 M/UL — SIGNIFICANT CHANGE UP (ref 3.8–5.2)
RBC # FLD: 13.4 % — SIGNIFICANT CHANGE UP (ref 10.3–14.5)
SODIUM SERPL-SCNC: 135 MMOL/L — SIGNIFICANT CHANGE UP (ref 135–145)
TROPONIN I, HIGH SENSITIVITY RESULT: 44.76 NG/L — SIGNIFICANT CHANGE UP
WBC # BLD: 14.72 K/UL — HIGH (ref 3.8–10.5)
WBC # FLD AUTO: 14.72 K/UL — HIGH (ref 3.8–10.5)

## 2021-10-25 PROCEDURE — 85027 COMPLETE CBC AUTOMATED: CPT

## 2021-10-25 PROCEDURE — 73562 X-RAY EXAM OF KNEE 3: CPT | Mod: 26,LT

## 2021-10-25 PROCEDURE — 73502 X-RAY EXAM HIP UNI 2-3 VIEWS: CPT | Mod: 26,LT

## 2021-10-25 PROCEDURE — C1776: CPT

## 2021-10-25 PROCEDURE — C1713: CPT

## 2021-10-25 PROCEDURE — 81001 URINALYSIS AUTO W/SCOPE: CPT

## 2021-10-25 PROCEDURE — C1889: CPT

## 2021-10-25 PROCEDURE — 71045 X-RAY EXAM CHEST 1 VIEW: CPT | Mod: 26

## 2021-10-25 PROCEDURE — 84484 ASSAY OF TROPONIN QUANT: CPT

## 2021-10-25 PROCEDURE — 86769 SARS-COV-2 COVID-19 ANTIBODY: CPT

## 2021-10-25 PROCEDURE — 82040 ASSAY OF SERUM ALBUMIN: CPT

## 2021-10-25 PROCEDURE — 86923 COMPATIBILITY TEST ELECTRIC: CPT

## 2021-10-25 PROCEDURE — 93005 ELECTROCARDIOGRAM TRACING: CPT

## 2021-10-25 PROCEDURE — 85025 COMPLETE CBC W/AUTO DIFF WBC: CPT

## 2021-10-25 PROCEDURE — 74176 CT ABD & PELVIS W/O CONTRAST: CPT

## 2021-10-25 PROCEDURE — 97530 THERAPEUTIC ACTIVITIES: CPT | Mod: GP

## 2021-10-25 PROCEDURE — 97110 THERAPEUTIC EXERCISES: CPT | Mod: GP

## 2021-10-25 PROCEDURE — 36415 COLL VENOUS BLD VENIPUNCTURE: CPT

## 2021-10-25 PROCEDURE — 36430 TRANSFUSION BLD/BLD COMPNT: CPT

## 2021-10-25 PROCEDURE — 71045 X-RAY EXAM CHEST 1 VIEW: CPT

## 2021-10-25 PROCEDURE — 85610 PROTHROMBIN TIME: CPT

## 2021-10-25 PROCEDURE — 97116 GAIT TRAINING THERAPY: CPT | Mod: GP

## 2021-10-25 PROCEDURE — P9040: CPT

## 2021-10-25 PROCEDURE — 85730 THROMBOPLASTIN TIME PARTIAL: CPT

## 2021-10-25 PROCEDURE — 84132 ASSAY OF SERUM POTASSIUM: CPT

## 2021-10-25 PROCEDURE — 70450 CT HEAD/BRAIN W/O DYE: CPT | Mod: 26,MA

## 2021-10-25 PROCEDURE — 87635 SARS-COV-2 COVID-19 AMP PRB: CPT

## 2021-10-25 PROCEDURE — 80048 BASIC METABOLIC PNL TOTAL CA: CPT

## 2021-10-25 PROCEDURE — 83880 ASSAY OF NATRIURETIC PEPTIDE: CPT

## 2021-10-25 PROCEDURE — C9113: CPT

## 2021-10-25 PROCEDURE — 82306 VITAMIN D 25 HYDROXY: CPT

## 2021-10-25 PROCEDURE — P9016: CPT

## 2021-10-25 PROCEDURE — 93010 ELECTROCARDIOGRAM REPORT: CPT

## 2021-10-25 PROCEDURE — 99285 EMERGENCY DEPT VISIT HI MDM: CPT

## 2021-10-25 PROCEDURE — 73552 X-RAY EXAM OF FEMUR 2/>: CPT | Mod: 26,LT

## 2021-10-25 PROCEDURE — 76000 FLUOROSCOPY <1 HR PHYS/QHP: CPT

## 2021-10-25 RX ORDER — SODIUM CHLORIDE 9 MG/ML
1000 INJECTION, SOLUTION INTRAVENOUS
Refills: 0 | Status: DISCONTINUED | OUTPATIENT
Start: 2021-10-25 | End: 2021-10-28

## 2021-10-25 RX ORDER — ONDANSETRON 8 MG/1
4 TABLET, FILM COATED ORAL ONCE
Refills: 0 | Status: COMPLETED | OUTPATIENT
Start: 2021-10-25 | End: 2021-10-25

## 2021-10-25 RX ORDER — WARFARIN SODIUM 2.5 MG/1
1 TABLET ORAL
Qty: 0 | Refills: 0 | DISCHARGE

## 2021-10-25 RX ORDER — MULTIVIT-MIN/FERROUS GLUCONATE 9 MG/15 ML
1 LIQUID (ML) ORAL
Qty: 0 | Refills: 0 | DISCHARGE

## 2021-10-25 RX ORDER — MORPHINE SULFATE 50 MG/1
4 CAPSULE, EXTENDED RELEASE ORAL ONCE
Refills: 0 | Status: DISCONTINUED | OUTPATIENT
Start: 2021-10-25 | End: 2021-10-25

## 2021-10-25 RX ORDER — SODIUM CHLORIDE 9 MG/ML
500 INJECTION INTRAMUSCULAR; INTRAVENOUS; SUBCUTANEOUS ONCE
Refills: 0 | Status: COMPLETED | OUTPATIENT
Start: 2021-10-25 | End: 2021-10-25

## 2021-10-25 RX ADMIN — ONDANSETRON 4 MILLIGRAM(S): 8 TABLET, FILM COATED ORAL at 20:23

## 2021-10-25 RX ADMIN — SODIUM CHLORIDE 500 MILLILITER(S): 9 INJECTION INTRAMUSCULAR; INTRAVENOUS; SUBCUTANEOUS at 23:11

## 2021-10-25 RX ADMIN — SODIUM CHLORIDE 500 MILLILITER(S): 9 INJECTION INTRAMUSCULAR; INTRAVENOUS; SUBCUTANEOUS at 20:23

## 2021-10-25 RX ADMIN — MORPHINE SULFATE 4 MILLIGRAM(S): 50 CAPSULE, EXTENDED RELEASE ORAL at 23:11

## 2021-10-25 RX ADMIN — MORPHINE SULFATE 4 MILLIGRAM(S): 50 CAPSULE, EXTENDED RELEASE ORAL at 20:22

## 2021-10-25 NOTE — ED PROVIDER NOTE - CPE EDP MUSC NORM
HPI     Presenting Complaint: Pt here today for yearly eye exam. Pt states vision   has been stable.     Ophthalmic medication / drops: Art tears as needed for dryness    (-) headaches  (-) diplopia   (-) flashes / (-) floaters      Last edited by Agusto Dawn, OD on 2/7/2018  1:17 PM. (History)            Assessment /Plan     For exam results, see Encounter Report.    Nuclear sclerosis, bilateral    Cortical cataract    Refractive error    Dry eye syndrome, bilateral      Mild cataracts OU. Discussed possible ocular affects of cataracts. Acceptable BCVA OU. Discussed treatment options. Surgery not recommended at this time. Monitor yearly.     Dispensed updated spectacle Rx. Discussed various spectacle lens options. Discussed adaptation period to new specs.     DONOVAN OU. Discussed ocular affects of dry eyes. Recommend OTC Systane artificial tears four times a day in OU. Discussed chronicity of DONOVAN. RTC if symptoms not alleviated by continued use of artificial tears.         RTC in 1 year for comprehensive eye exam, or sooner prn.                     normal...

## 2021-10-25 NOTE — PHARMACOTHERAPY INTERVENTION NOTE - COMMENTS
Medication history complete, reviewed medications with patients ting Shah in waiting room and confirmed with [facility].

## 2021-10-25 NOTE — ED PROVIDER NOTE - NSICDXPASTMEDICALHX_GEN_ALL_CORE_FT
PAST MEDICAL HISTORY:  AF (atrial fibrillation)     HTN (hypertension)     Hyperlipidemia     Hypothyroid

## 2021-10-25 NOTE — ED ADULT NURSE NOTE - OBJECTIVE STATEMENT
Patient presents to ED complaining of fall. Patient complaining of fall off tricycle walker falling on L side. Patient states she hit back of her head, denies LOC. Patient complaining of L hip pain, patient L leg externally rotated unable to lift L leg. Patient complaining of 9/10 pain. Patient denies HA, dizziness, blurred vision, CP, palpitations, fver, chills, NVD,

## 2021-10-25 NOTE — ED PROVIDER NOTE - CONSTITUTIONAL, MLM
normal... awake, alert, oriented to person, place, time/situation and in no apparent distress. + chronically ill appearing

## 2021-10-25 NOTE — CONSULT NOTE ADULT - ATTENDING COMMENTS
Pt seen and examined. Agree with above H&P. Relevant imaging reviewed. Medical optimization appreciated. High risk. All RBA discussed. Plan for ORIF left IT hip fx. Postop protocol discussed.

## 2021-10-25 NOTE — ED PROVIDER NOTE - MUSCULOSKELETAL, MLM
Spine appears normal, +left ttp , LE externally rotated and short widen distal nvi Spine appears normal, +left ttp , LLE externally rotated and shortened, distal NVI

## 2021-10-25 NOTE — ED ADULT TRIAGE NOTE - STATUS:
Greer with [facility] is running home IV infusion benefits for possible home IV  ABX. Applied

## 2021-10-25 NOTE — ED PROVIDER NOTE - OBJECTIVE STATEMENT
89y/o female with a PMHx of a-fib on coumadin, HTN, HLD, hypothyroidism, HFpEF, pleural effusions treated with diuresis presents to the ED BIBA from home. Pt fell this afternoon from electric wheel chair hitting her head landing on left side. c/o left hip pain inability to walk.  Denies n/v/d, neck pain, cp, abd pain. Pt is not sure if she is on blood thinners, however her EMR shows that she is on coumadin for a fib. 89y/o female with a PMHx of a-fib on coumadin, HTN, HLD, hypothyroidism, HFpEF, pleural effusions treated with diuresis presents to the ED BIBA from home. Pt fell this afternoon from electric wheel chair hitting her head landing on left side. c/o left hip pain and  inability to walk.  Denies n/v/d, neck pain, cp, abd pain.

## 2021-10-25 NOTE — CONSULT NOTE ADULT - SUBJECTIVE AND OBJECTIVE BOX
Patient is a 89yFemale community ambulator with assistive devices who presents to ED w/ a c/o of left hip pain s/p mechanical fall. Denies HH/LOC. States inability to walk immediately following the injury. Denies any numbness or tingling. Denies having any other pain elsewhere. Denies any previous orthopedic history. No other orthopedic concerns at this time.    AF (atrial fibrillation)    HTN (hypertension)    Hyperlipidemia    Hypothyroid            Bactrim (Unknown)      PHYSICAL EXAM:  T(C): 36.4 (10-25-21 @ 19:55), Max: 36.4 (10-25-21 @ 19:55)  HR: 96 (10-25-21 @ 19:55) (96 - 96)  BP: 139/85 (10-25-21 @ 19:55) (139/85 - 139/85)  RR: 19 (10-25-21 @ 19:55) (19 - 19)  SpO2: 99% (10-25-21 @ 19:55) (99% - 99%)    Gen: NAD, Resting comfortably    LLE:  Skin intact, no erythema or ecchymosis  pos bony tenderness to palpation over left hip  +EHL/FHL/TA/GSC  +SILT L3-S1  + DP  Compartments soft and compressible  No calf tenderness    Secondary Exam: Benign, Skin intact, NTTP along axial spine, SILT throughout, motor grossly intact throughout, no other orthopedic injuries at this time, compartments soft and compressible    Xray shows left IT fx     A/P: 89F w left IT fx    fx that needs operative fixation, plan for OR tomorrow w Dr Pennington for IMN, all questions answered pt agrees with plan  medical management and optimization appreciated   Analgesia  NWB  DVT ppx hold after midnight  preop labs  npo after midnight   FU INR downtrend  Ice and elevate as tolerated  Discussed with attending who is in agreement with above plan

## 2021-10-26 ENCOUNTER — TRANSCRIPTION ENCOUNTER (OUTPATIENT)
Age: 86
End: 2021-10-26

## 2021-10-26 DIAGNOSIS — Z90.49 ACQUIRED ABSENCE OF OTHER SPECIFIED PARTS OF DIGESTIVE TRACT: Chronic | ICD-10-CM

## 2021-10-26 LAB
24R-OH-CALCIDIOL SERPL-MCNC: 18.6 NG/ML — LOW (ref 30–80)
ANION GAP SERPL CALC-SCNC: 5 MMOL/L — SIGNIFICANT CHANGE UP (ref 5–17)
ANION GAP SERPL CALC-SCNC: 5 MMOL/L — SIGNIFICANT CHANGE UP (ref 5–17)
APPEARANCE UR: ABNORMAL
BASOPHILS # BLD AUTO: 0.05 K/UL — SIGNIFICANT CHANGE UP (ref 0–0.2)
BASOPHILS NFR BLD AUTO: 0.7 % — SIGNIFICANT CHANGE UP (ref 0–2)
BILIRUB UR-MCNC: NEGATIVE — SIGNIFICANT CHANGE UP
BUN SERPL-MCNC: 27 MG/DL — HIGH (ref 7–23)
BUN SERPL-MCNC: 28 MG/DL — HIGH (ref 7–23)
CALCIUM SERPL-MCNC: 8.7 MG/DL — SIGNIFICANT CHANGE UP (ref 8.5–10.1)
CALCIUM SERPL-MCNC: 8.8 MG/DL — SIGNIFICANT CHANGE UP (ref 8.5–10.1)
CHLORIDE SERPL-SCNC: 106 MMOL/L — SIGNIFICANT CHANGE UP (ref 96–108)
CHLORIDE SERPL-SCNC: 108 MMOL/L — SIGNIFICANT CHANGE UP (ref 96–108)
CO2 SERPL-SCNC: 26 MMOL/L — SIGNIFICANT CHANGE UP (ref 22–31)
CO2 SERPL-SCNC: 28 MMOL/L — SIGNIFICANT CHANGE UP (ref 22–31)
COLOR SPEC: YELLOW — SIGNIFICANT CHANGE UP
COVID-19 SPIKE DOMAIN AB INTERP: NEGATIVE — SIGNIFICANT CHANGE UP
COVID-19 SPIKE DOMAIN ANTIBODY RESULT: 0.4 U/ML — SIGNIFICANT CHANGE UP
CREAT SERPL-MCNC: 0.97 MG/DL — SIGNIFICANT CHANGE UP (ref 0.5–1.3)
CREAT SERPL-MCNC: 0.98 MG/DL — SIGNIFICANT CHANGE UP (ref 0.5–1.3)
DIFF PNL FLD: ABNORMAL
EOSINOPHIL # BLD AUTO: 0.02 K/UL — SIGNIFICANT CHANGE UP (ref 0–0.5)
EOSINOPHIL NFR BLD AUTO: 0.3 % — SIGNIFICANT CHANGE UP (ref 0–6)
GLUCOSE SERPL-MCNC: 109 MG/DL — HIGH (ref 70–99)
GLUCOSE SERPL-MCNC: 113 MG/DL — HIGH (ref 70–99)
GLUCOSE UR QL: NEGATIVE MG/DL — SIGNIFICANT CHANGE UP
HCT VFR BLD CALC: 29.4 % — LOW (ref 34.5–45)
HCT VFR BLD CALC: 33.2 % — LOW (ref 34.5–45)
HGB BLD-MCNC: 10.3 G/DL — LOW (ref 11.5–15.5)
HGB BLD-MCNC: 9.2 G/DL — LOW (ref 11.5–15.5)
IMM GRANULOCYTES NFR BLD AUTO: 0.4 % — SIGNIFICANT CHANGE UP (ref 0–1.5)
INR BLD: 1.68 RATIO — HIGH (ref 0.88–1.16)
INR BLD: 1.94 RATIO — HIGH (ref 0.88–1.16)
INR BLD: 2.15 RATIO — HIGH (ref 0.88–1.16)
INR BLD: 2.46 RATIO — HIGH (ref 0.88–1.16)
KETONES UR-MCNC: ABNORMAL
LEUKOCYTE ESTERASE UR-ACNC: ABNORMAL
LYMPHOCYTES # BLD AUTO: 0.83 K/UL — LOW (ref 1–3.3)
LYMPHOCYTES # BLD AUTO: 12 % — LOW (ref 13–44)
MCHC RBC-ENTMCNC: 28.5 PG — SIGNIFICANT CHANGE UP (ref 27–34)
MCHC RBC-ENTMCNC: 28.5 PG — SIGNIFICANT CHANGE UP (ref 27–34)
MCHC RBC-ENTMCNC: 31 GM/DL — LOW (ref 32–36)
MCHC RBC-ENTMCNC: 31.3 GM/DL — LOW (ref 32–36)
MCV RBC AUTO: 91 FL — SIGNIFICANT CHANGE UP (ref 80–100)
MCV RBC AUTO: 91.7 FL — SIGNIFICANT CHANGE UP (ref 80–100)
MONOCYTES # BLD AUTO: 0.79 K/UL — SIGNIFICANT CHANGE UP (ref 0–0.9)
MONOCYTES NFR BLD AUTO: 11.4 % — SIGNIFICANT CHANGE UP (ref 2–14)
NEUTROPHILS # BLD AUTO: 5.18 K/UL — SIGNIFICANT CHANGE UP (ref 1.8–7.4)
NEUTROPHILS NFR BLD AUTO: 75.2 % — SIGNIFICANT CHANGE UP (ref 43–77)
NITRITE UR-MCNC: NEGATIVE — SIGNIFICANT CHANGE UP
PH UR: 5 — SIGNIFICANT CHANGE UP (ref 5–8)
PLATELET # BLD AUTO: 117 K/UL — LOW (ref 150–400)
PLATELET # BLD AUTO: 121 K/UL — LOW (ref 150–400)
POTASSIUM SERPL-MCNC: 4.3 MMOL/L — SIGNIFICANT CHANGE UP (ref 3.5–5.3)
POTASSIUM SERPL-MCNC: 4.5 MMOL/L — SIGNIFICANT CHANGE UP (ref 3.5–5.3)
POTASSIUM SERPL-SCNC: 4.3 MMOL/L — SIGNIFICANT CHANGE UP (ref 3.5–5.3)
POTASSIUM SERPL-SCNC: 4.5 MMOL/L — SIGNIFICANT CHANGE UP (ref 3.5–5.3)
PROT UR-MCNC: 15 MG/DL
PROTHROM AB SERPL-ACNC: 19 SEC — HIGH (ref 10.6–13.6)
PROTHROM AB SERPL-ACNC: 21.8 SEC — HIGH (ref 10.6–13.6)
PROTHROM AB SERPL-ACNC: 24.1 SEC — HIGH (ref 10.6–13.6)
PROTHROM AB SERPL-ACNC: 27.3 SEC — HIGH (ref 10.6–13.6)
RBC # BLD: 3.23 M/UL — LOW (ref 3.8–5.2)
RBC # BLD: 3.62 M/UL — LOW (ref 3.8–5.2)
RBC # FLD: 13.4 % — SIGNIFICANT CHANGE UP (ref 10.3–14.5)
RBC # FLD: 14.6 % — HIGH (ref 10.3–14.5)
SARS-COV-2 IGG+IGM SERPL QL IA: 0.4 U/ML — SIGNIFICANT CHANGE UP
SARS-COV-2 IGG+IGM SERPL QL IA: NEGATIVE — SIGNIFICANT CHANGE UP
SARS-COV-2 RNA SPEC QL NAA+PROBE: SIGNIFICANT CHANGE UP
SODIUM SERPL-SCNC: 137 MMOL/L — SIGNIFICANT CHANGE UP (ref 135–145)
SODIUM SERPL-SCNC: 141 MMOL/L — SIGNIFICANT CHANGE UP (ref 135–145)
SP GR SPEC: 1.02 — SIGNIFICANT CHANGE UP (ref 1.01–1.02)
UROBILINOGEN FLD QL: NEGATIVE MG/DL — SIGNIFICANT CHANGE UP
WBC # BLD: 16.01 K/UL — HIGH (ref 3.8–10.5)
WBC # BLD: 6.9 K/UL — SIGNIFICANT CHANGE UP (ref 3.8–10.5)
WBC # FLD AUTO: 16.01 K/UL — HIGH (ref 3.8–10.5)
WBC # FLD AUTO: 6.9 K/UL — SIGNIFICANT CHANGE UP (ref 3.8–10.5)

## 2021-10-26 PROCEDURE — 99223 1ST HOSP IP/OBS HIGH 75: CPT

## 2021-10-26 PROCEDURE — 27245 TREAT THIGH FRACTURE: CPT | Mod: LT

## 2021-10-26 PROCEDURE — 99223 1ST HOSP IP/OBS HIGH 75: CPT | Mod: 57

## 2021-10-26 RX ORDER — SODIUM CHLORIDE 9 MG/ML
1000 INJECTION, SOLUTION INTRAVENOUS
Refills: 0 | Status: DISCONTINUED | OUTPATIENT
Start: 2021-10-26 | End: 2021-10-28

## 2021-10-26 RX ORDER — BENZOCAINE AND MENTHOL 5; 1 G/100ML; G/100ML
1 LIQUID ORAL
Refills: 0 | Status: DISCONTINUED | OUTPATIENT
Start: 2021-10-26 | End: 2021-11-01

## 2021-10-26 RX ORDER — LANOLIN ALCOHOL/MO/W.PET/CERES
6 CREAM (GRAM) TOPICAL AT BEDTIME
Refills: 0 | Status: DISCONTINUED | OUTPATIENT
Start: 2021-10-26 | End: 2021-10-30

## 2021-10-26 RX ORDER — PHYTONADIONE (VIT K1) 5 MG
5 TABLET ORAL ONCE
Refills: 0 | Status: COMPLETED | OUTPATIENT
Start: 2021-10-26 | End: 2021-10-26

## 2021-10-26 RX ORDER — HEPARIN SODIUM 5000 [USP'U]/ML
5000 INJECTION INTRAVENOUS; SUBCUTANEOUS EVERY 8 HOURS
Refills: 0 | Status: DISCONTINUED | OUTPATIENT
Start: 2021-10-26 | End: 2021-10-29

## 2021-10-26 RX ORDER — WARFARIN SODIUM 2.5 MG/1
5 TABLET ORAL ONCE
Refills: 0 | Status: COMPLETED | OUTPATIENT
Start: 2021-10-27 | End: 2021-10-27

## 2021-10-26 RX ORDER — LANOLIN ALCOHOL/MO/W.PET/CERES
3 CREAM (GRAM) TOPICAL AT BEDTIME
Refills: 0 | Status: DISCONTINUED | OUTPATIENT
Start: 2021-10-26 | End: 2021-10-27

## 2021-10-26 RX ORDER — TRAMADOL HYDROCHLORIDE 50 MG/1
100 TABLET ORAL EVERY 4 HOURS
Refills: 0 | Status: DISCONTINUED | OUTPATIENT
Start: 2021-10-26 | End: 2021-10-30

## 2021-10-26 RX ORDER — TRAMADOL HYDROCHLORIDE 50 MG/1
50 TABLET ORAL EVERY 4 HOURS
Refills: 0 | Status: DISCONTINUED | OUTPATIENT
Start: 2021-10-26 | End: 2021-10-30

## 2021-10-26 RX ORDER — MIDODRINE HYDROCHLORIDE 2.5 MG/1
2.5 TABLET ORAL THREE TIMES A DAY
Refills: 0 | Status: DISCONTINUED | OUTPATIENT
Start: 2021-10-26 | End: 2021-10-30

## 2021-10-26 RX ORDER — ATORVASTATIN CALCIUM 80 MG/1
10 TABLET, FILM COATED ORAL AT BEDTIME
Refills: 0 | Status: DISCONTINUED | OUTPATIENT
Start: 2021-10-26 | End: 2021-11-01

## 2021-10-26 RX ORDER — WARFARIN SODIUM 2.5 MG/1
1.5 TABLET ORAL
Qty: 0 | Refills: 0 | DISCHARGE

## 2021-10-26 RX ORDER — ACETAMINOPHEN 500 MG
650 TABLET ORAL EVERY 6 HOURS
Refills: 0 | Status: DISCONTINUED | OUTPATIENT
Start: 2021-10-26 | End: 2021-11-01

## 2021-10-26 RX ORDER — ATORVASTATIN CALCIUM 80 MG/1
10 TABLET, FILM COATED ORAL AT BEDTIME
Refills: 0 | Status: DISCONTINUED | OUTPATIENT
Start: 2021-10-26 | End: 2021-10-26

## 2021-10-26 RX ORDER — SENNA PLUS 8.6 MG/1
2 TABLET ORAL AT BEDTIME
Refills: 0 | Status: DISCONTINUED | OUTPATIENT
Start: 2021-10-26 | End: 2021-10-26

## 2021-10-26 RX ORDER — POLYETHYLENE GLYCOL 3350 17 G/17G
17 POWDER, FOR SOLUTION ORAL DAILY
Refills: 0 | Status: DISCONTINUED | OUTPATIENT
Start: 2021-10-26 | End: 2021-11-01

## 2021-10-26 RX ORDER — SPIRONOLACTONE 25 MG/1
25 TABLET, FILM COATED ORAL DAILY
Refills: 0 | Status: DISCONTINUED | OUTPATIENT
Start: 2021-10-26 | End: 2021-10-27

## 2021-10-26 RX ORDER — MORPHINE SULFATE 50 MG/1
4 CAPSULE, EXTENDED RELEASE ORAL EVERY 4 HOURS
Refills: 0 | Status: DISCONTINUED | OUTPATIENT
Start: 2021-10-26 | End: 2021-10-30

## 2021-10-26 RX ORDER — ONDANSETRON 8 MG/1
4 TABLET, FILM COATED ORAL THREE TIMES A DAY
Refills: 0 | Status: DISCONTINUED | OUTPATIENT
Start: 2021-10-26 | End: 2021-10-26

## 2021-10-26 RX ORDER — ASPIRIN/CALCIUM CARB/MAGNESIUM 324 MG
81 TABLET ORAL DAILY
Refills: 0 | Status: DISCONTINUED | OUTPATIENT
Start: 2021-10-26 | End: 2021-11-01

## 2021-10-26 RX ORDER — SPIRONOLACTONE 25 MG/1
12.5 TABLET, FILM COATED ORAL DAILY
Refills: 0 | Status: DISCONTINUED | OUTPATIENT
Start: 2021-10-26 | End: 2021-10-26

## 2021-10-26 RX ORDER — FENTANYL CITRATE 50 UG/ML
25 INJECTION INTRAVENOUS
Refills: 0 | Status: DISCONTINUED | OUTPATIENT
Start: 2021-10-26 | End: 2021-10-27

## 2021-10-26 RX ORDER — SODIUM CHLORIDE 9 MG/ML
1000 INJECTION, SOLUTION INTRAVENOUS
Refills: 0 | Status: DISCONTINUED | OUTPATIENT
Start: 2021-10-26 | End: 2021-10-27

## 2021-10-26 RX ORDER — LEVOTHYROXINE SODIUM 125 MCG
100 TABLET ORAL DAILY
Refills: 0 | Status: DISCONTINUED | OUTPATIENT
Start: 2021-10-26 | End: 2021-11-01

## 2021-10-26 RX ORDER — SODIUM CHLORIDE 9 MG/ML
1000 INJECTION INTRAMUSCULAR; INTRAVENOUS; SUBCUTANEOUS
Refills: 0 | Status: DISCONTINUED | OUTPATIENT
Start: 2021-10-26 | End: 2021-10-26

## 2021-10-26 RX ORDER — ACETAMINOPHEN 500 MG
975 TABLET ORAL EVERY 8 HOURS
Refills: 0 | Status: DISCONTINUED | OUTPATIENT
Start: 2021-10-26 | End: 2021-10-26

## 2021-10-26 RX ORDER — SENNA PLUS 8.6 MG/1
2 TABLET ORAL AT BEDTIME
Refills: 0 | Status: DISCONTINUED | OUTPATIENT
Start: 2021-10-26 | End: 2021-11-01

## 2021-10-26 RX ORDER — SODIUM CHLORIDE 9 MG/ML
1000 INJECTION INTRAMUSCULAR; INTRAVENOUS; SUBCUTANEOUS
Refills: 0 | Status: DISCONTINUED | OUTPATIENT
Start: 2021-10-26 | End: 2021-10-28

## 2021-10-26 RX ORDER — FUROSEMIDE 40 MG
40 TABLET ORAL DAILY
Refills: 0 | Status: DISCONTINUED | OUTPATIENT
Start: 2021-10-26 | End: 2021-10-27

## 2021-10-26 RX ORDER — CEFAZOLIN SODIUM 1 G
2000 VIAL (EA) INJECTION EVERY 8 HOURS
Refills: 0 | Status: COMPLETED | OUTPATIENT
Start: 2021-10-26 | End: 2021-10-27

## 2021-10-26 RX ORDER — ONDANSETRON 8 MG/1
4 TABLET, FILM COATED ORAL THREE TIMES A DAY
Refills: 0 | Status: DISCONTINUED | OUTPATIENT
Start: 2021-10-26 | End: 2021-10-29

## 2021-10-26 RX ORDER — FUROSEMIDE 40 MG
40 TABLET ORAL DAILY
Refills: 0 | Status: DISCONTINUED | OUTPATIENT
Start: 2021-10-26 | End: 2021-10-26

## 2021-10-26 RX ORDER — HYDROMORPHONE HYDROCHLORIDE 2 MG/ML
0.5 INJECTION INTRAMUSCULAR; INTRAVENOUS; SUBCUTANEOUS
Refills: 0 | Status: DISCONTINUED | OUTPATIENT
Start: 2021-10-26 | End: 2021-10-27

## 2021-10-26 RX ADMIN — HYDROMORPHONE HYDROCHLORIDE 0.5 MILLIGRAM(S): 2 INJECTION INTRAMUSCULAR; INTRAVENOUS; SUBCUTANEOUS at 22:45

## 2021-10-26 RX ADMIN — Medication 101 MILLIGRAM(S): at 10:21

## 2021-10-26 RX ADMIN — MORPHINE SULFATE 4 MILLIGRAM(S): 50 CAPSULE, EXTENDED RELEASE ORAL at 03:19

## 2021-10-26 RX ADMIN — SODIUM CHLORIDE 75 MILLILITER(S): 9 INJECTION, SOLUTION INTRAVENOUS at 03:05

## 2021-10-26 RX ADMIN — Medication 100 MICROGRAM(S): at 05:38

## 2021-10-26 RX ADMIN — SODIUM CHLORIDE 75 MILLILITER(S): 9 INJECTION, SOLUTION INTRAVENOUS at 13:37

## 2021-10-26 RX ADMIN — Medication 975 MILLIGRAM(S): at 05:47

## 2021-10-26 RX ADMIN — SODIUM CHLORIDE 75 MILLILITER(S): 9 INJECTION, SOLUTION INTRAVENOUS at 00:00

## 2021-10-26 RX ADMIN — MORPHINE SULFATE 4 MILLIGRAM(S): 50 CAPSULE, EXTENDED RELEASE ORAL at 10:31

## 2021-10-26 RX ADMIN — SODIUM CHLORIDE 75 MILLILITER(S): 9 INJECTION, SOLUTION INTRAVENOUS at 22:33

## 2021-10-26 RX ADMIN — MIDODRINE HYDROCHLORIDE 2.5 MILLIGRAM(S): 2.5 TABLET ORAL at 17:39

## 2021-10-26 RX ADMIN — MORPHINE SULFATE 4 MILLIGRAM(S): 50 CAPSULE, EXTENDED RELEASE ORAL at 01:10

## 2021-10-26 RX ADMIN — MORPHINE SULFATE 4 MILLIGRAM(S): 50 CAPSULE, EXTENDED RELEASE ORAL at 03:04

## 2021-10-26 RX ADMIN — MORPHINE SULFATE 4 MILLIGRAM(S): 50 CAPSULE, EXTENDED RELEASE ORAL at 16:06

## 2021-10-26 RX ADMIN — MIDODRINE HYDROCHLORIDE 2.5 MILLIGRAM(S): 2.5 TABLET ORAL at 12:22

## 2021-10-26 RX ADMIN — HYDROMORPHONE HYDROCHLORIDE 0.5 MILLIGRAM(S): 2 INJECTION INTRAMUSCULAR; INTRAVENOUS; SUBCUTANEOUS at 22:30

## 2021-10-26 RX ADMIN — MIDODRINE HYDROCHLORIDE 2.5 MILLIGRAM(S): 2.5 TABLET ORAL at 05:38

## 2021-10-26 RX ADMIN — MORPHINE SULFATE 4 MILLIGRAM(S): 50 CAPSULE, EXTENDED RELEASE ORAL at 15:52

## 2021-10-26 NOTE — CONSULT NOTE ADULT - SUBJECTIVE AND OBJECTIVE BOX
HPI:  89 y/o F with PMH A fib on Warfarin, HTN, HLD, hypothyroidism, CHFpEF, pleural effusions, severe pulmonary HTN, mild to moderate mitral regurgitation, severe tricuspid valve regurgitation presents for a fall. The pt reports that her left leg gave out and she fell down onto the kitchen floor. She believes this occurred around 2 pm and her son did not come home and find her until 4 pm. She did not lose consciousness. Does report hitting her head on the floor. She is complaining of 10/10 pain located at the left hip described as an ache. She does use a scooter to at home adn does walk around on her own but will try to hold onto something. Does not walk very long distances. Denies chest pain or shortness of breath at rest or on exertion, fevers, chills, abdominal pain, N/V, diarrhea/constipation.     ER course: VSS. Labs: WBC 14.72, , INR 2.80, BUN 31, Glucose 149. EKG: Atrial fibrillation with HR 89 bpm, QTC prolonged to 498, LVH, T wave inversions in V5 and V6 are unchanged from prior EKG on 10/29/2020 (personally reviewed).     Imaging:   - XR left knee: joint space narrowing, sclerosis suggesting degenerative joint disease (personally reviewed).   - XR left femur: left femur fracture    - XR hip with pelvis: Left femur fracture .   - CXR: hyperinflated lung fields, flattened diaphragms, no consolidation, no effusion, no pneumothorax (personally reviewed).   - CT head: Increased parenchymal volume loss. No acute hemorrhage mass or mass effect.    Pt was given 500 ml bolus of NS, morphine, and zofran. She is being admitted to med/surg for further management.    (26 Oct 2021 00:48)      Patient is a 89y old  Female who presents with a chief complaint of Fall, left hip pain (26 Oct 2021 06:12)      Consulted by Dr. Pennington   for VTE prophylaxis, risk stratification, and anticoagulation management.    PAST MEDICAL & SURGICAL HISTORY:  AF (atrial fibrillation)  On Warfarin    HTN (hypertension)    Hyperlipidemia    Hypothyroid    Congestive heart failure  Preserved EF    Pleural effusion    Severe pulmonary hypertension    Moderate mitral regurgitation    Severe tricuspid regurgitation    S/P appendectomy    Interval history  10/26/21:Patient seen at bedside, discussed the anticoagulation, she is on Coumadin for Afib, patient's INR is  2.15, NPO for OR today, patient takes 2mg of Coumadin 5times a week.Denies any h/o bleeding         CrCl:29.8      Caprini VTE Risk Score:CAPRINI SCORE  AGE RELATED RISK FACTORS                                                       MOBILITY RELATED FACTORS  [ ] Age 41-60 years                                            (1 Point)                  [ ] Bed rest /restricted mobility                             (1 Point)  [ ] Age: 61-74 years                                           (2 Points)                [ ] Plaster cast                                                   (2 Points)  [x ] Age= 75 years                                              (3 Points)                 [ ] Bed bound for more than 72 hours                   (2 Points)    DISEASE RELATED RISK FACTORS                                               GENDER SPECIFIC FACTORS  [ ] Edema in the lower extremities                       (1 Point)           [ ] Pregnancy                                                            (1 Point)  [ ] Varicose veins                                               (1 Point)                  [ ] Post-partum < 6 weeks                                      (1 Point)             [ ] BMI > 25 Kg/m2                                            (1 Point)                  [ ] Hormonal therapy or oral contraception       (1 Point)                 [ ] Sepsis (in the previous month)                        (1 Point)             [ ] History of pregnancy complications                (1Point)  [ ] Pneumonia or serious lung disease                                             [ ] Unexplained or recurrent  (=/>3), premature                                 (In the previous month)                               (1 Point)                birth with toxemia or growth-restricted infant (1 Point)  [ ] Abnormal pulmonary function test            (1 Point)                                   SURGERY RELATED RISK FACTORS  [ ] Acute myocardial infarction                       (1 Point)                  [ ]  Section                                         (1 Point)  [ ] Congestive heart failure (in the previous month) (1 Point)   [ ] Minor surgery   lasting <45 minutes       (1 Point)   [ ] Inflammatory bowel disease                             (1 Point)          [ ] Arthroscopic surgery                                  (2 Points)  [ ] Central venous access                                    (2 Points)            [ ] General surgery lasting >45 minutes      (2 Points)       [ ] Stroke (in the previous month)                  (5 Points)            [ ] Elective major lower extremity arthroplasty (5 Points)                                   [  ] Malignancy (present or past include skin melanoma                                          but exclude  basal skin cell)    (2 points)                                      TRAUMA RELATED RISK FACTORS                HEMATOLOGY RELATED FACTORS                                  [x ] Fracture of the hip, pelvis, or leg                       (5 Points)  [ ] Prior episodes of VTE                                     (3 Points)          [ ] Acute spinal cord injury (in the previous month)  (5 Points)  [ ] Positive family history for VTE                         (3 Points)       [ ] Paralysis (less than 1 month)                          (5 Points)  [ ] Prothrombin 74093 A                                      (3 Points)         [ ] Multiple Trauma (within 1month)                 (5Points)                                                                                                                                                                [ ] Factor V Leiden                                          (3 Points)                                OTHER RISK FACTORS                          [ ] Lupus anticoagulants                                     (3 Points)                       [ ] BMI > 40                          (1 Point)                                                         [ ] Anticardiolipin antibodies                                (3 Points)                   [ ] Smoking                              (1Point)                                                [ ] High homocysteine in the blood                      (3 Points)                [  ] Diabetes requiring insulin (1point)                         [ ] Other congenital or acquired thrombophilia       (3 Points)          [  ] Chemotherapy                   (1 Point)  [ ] Heparin induced thrombocytopenia                  (3 Points)             [  ] Blood Transfusion                (1 point)                                                                                                             Total Score [    8      ]                                                                                                                                                                                                                                                                                                                                                                                                                                       [id number] Score: 6    IMPROVE Bleeding Risk Score:4.5    Torniq time:     Time In:   Time Out:     Falls Risk:   High (x  )  Mod (  )  Low (  )      FAMILY HISTORY:  FH: lung cancer (Father)      Denies any personal or familial history of clotting or bleeding disorders.    Allergies    Bactrim (Unknown)    Intolerances        REVIEW OF SYSTEMS    (  )Fever	     (  )Constipation	(  )SOB				(  )Headache	(  )Dysuria  (  )Chills	     (  )Melena	(  )Dyspnea present on exertion	                    (  )Dizziness                    (  )Polyuria  (  )Nausea	     (  )Hematochezia	(  )Cough			                    (  )Syncope   	(  )Hematuria  (  )Vomiting    (  )Chest Pain	(  )Wheezing			(  )Weakness  (  )Diarrhea     (  )Palpitations	(  )Anorexia			( x )joint pain    All  other review of systems negative: Yes    Vital Signs Last 24 Hrs  T(C): 36.7 (26 Oct 2021 12:21), Max: 36.8 (26 Oct 2021 02:52)  T(F): 98 (26 Oct 2021 12:21), Max: 98.2 (26 Oct 2021 02:52)  HR: 82 (26 Oct 2021 12:21) (60 - 96)  BP: 114/58 (26 Oct 2021 12:21) (105/37 - 141/75)  BP(mean): 95 (25 Oct 2021 23:23) (95 - 95)  RR: 17 (26 Oct 2021 12:21) (17 - 19)  SpO2: 95% (26 Oct 2021 12:21) (95% - 100%)      PHYSICAL EXAM:    Constitutional: Appears Well    Neurological: A& O x 3    Skin: Warm    Respiratory and Thorax: normal effort; Breath sounds: normal; No rales/wheezing/rhonchi  	  Cardiovascular: S1, S2, irregular, NMBR	    Gastrointestinal: BS + x 4Q, nontender	    Genitourinary:  Bladder nondistended, nontender    Musculoskeletal:   General Right:   no muscle/joint tenderness,   normal tone, no joint swelling,   ROM: limited	    General Left:   + muscle/joint tenderness,   normal tone, no joint swelling,   ROM: limited    Hip:  Left: Dressing           Lower extrems:   Right: no calf tenderness              negative champ's sign               + pedal pulses    Left:   no calf tenderness              negative champ's sign               + pedal pulses                          9.2    6.90  )-----------( 121      ( 26 Oct 2021 07:55 )             29.4       10-26    141  |  108  |  28<H>  ----------------------------<  109<H>  4.5   |  28  |  0.97    Ca    8.8      26 Oct 2021 07:55    TPro  x   /  Alb  3.3  /  TBili  x   /  DBili  x   /  AST  x   /  ALT  x   /  AlkPhos  x   10-26      PT/INR - ( 26 Oct 2021 13:57 )   PT: 24.1 sec;   INR: 2.15 ratio         PTT - ( 26 Oct 2021 07:55 )  PTT:35.6 sec		    < from: CT Head No Cont (10.25.21 @ 20:49) >  IMPRESSION: Increased parenchymal volume loss.    No acute hemorrhage mass or mass effect.    < end of copied text >  		    MEDICATIONS  (STANDING):  acetaminophen     Tablet .. 975 milliGRAM(s) Oral every 8 hours  aspirin  chewable 81 milliGRAM(s) Oral daily  atorvastatin 10 milliGRAM(s) Oral at bedtime  lactated ringers. 1000 milliLiter(s) IV Continuous <Continuous>  levothyroxine 100 MICROGram(s) Oral daily  melatonin 6 milliGRAM(s) Oral at bedtime  midodrine. 2.5 milliGRAM(s) Oral three times a day  senna 2 Tablet(s) Oral at bedtime          DVT Prophylaxis:  LMWH                   (  )  Heparin SQ           (  )  Coumadin             ( H )  Xarelto                  (  )  Eliquis                   (  )  Venodynes           ( x )  Ambulation          ( x )  UFH                       (  )  Contraindicated  (  )  EC ASPIRIN       (  )

## 2021-10-26 NOTE — H&P ADULT - HISTORY OF PRESENT ILLNESS
89 y/o F with PMH A fib on Warfarin, HTN, HLD, hypothyroidism, CHFpEF, pleural effusions, severe pulmonary HTN, mild to moderate mitral regurgitation, severe tricuspid valve regurgitation presents for a fall. The pt reports that her left leg gave out and she fell down onto the kitchen floor. She believes this occurred around 2 pm and her son did not come home and find her until 4 pm. She did not lose consciousness. Does report hitting her head on the floor. She is complaining of 10/10 pain located at the left hip described as an ache. She does use a scooter to at home adn does walk around on her own but will try to hold onto something. Does not walk very long distances. Denies chest pain or shortness of breath at rest or on exertion, fevers, chills, abdominal pain, N/V, diarrhea/constipation.     ER course: VSS. Labs: WBC 14.72, , INR 2.80, BUN 31, Glucose 149. EKG: Atrial fibrillation with HR 89 bpm, QTC prolonged to 498, LVH, T wave inversions in V5 and V6 are unchanged from prior EKG on 10/29/2020 (personally reviewed).     Imaging:   - XR left knee: joint space narrowing, sclerosis suggesting degenerative joint disease (personally reviewed).   - XR left femur: left femur fracture (personally reviewed).   - XR hip with pelvis: Left femur fracture (personally reviewed).   - CXR: hyperinflated lung fields, flattened diaphragms, no consolidation, no effusion, no pneumothorax (personally reviewed).   - CT head: Increased parenchymal volume loss. No acute hemorrhage mass or mass effect.    Pt was given 500 ml bolus of NS, morphine, and zofran. She is being admitted to med/surg for further management.

## 2021-10-26 NOTE — CHART NOTE - NSCHARTNOTEFT_GEN_A_CORE
I received a call from anesthesiology regarding concerns given the pt's history of pulmonary hypertension and valvular abnormalities. The patient did see her cardiologist Dr. Aj on October 18th, her son was unsure when her last ECHO was completed. I placed a call to Dr. Aj's office. Dr. Ford was covering for the group. I reviewed the patient's prior ECHO with Dr. Ford and he did not feel that obtaining another ECHO would change the patient's management. He stated that her pulmonary hypertension should be classified as moderate given artery pressure. We agreed that she is medically optimized for IMN placement and we can proceed with the procedure as planned. I received a call from anesthesiology regarding concerns given the pt's history of pulmonary hypertension and valvular abnormalities. The patient did see her cardiologist Dr. Aj on October 18th, her son was unsure when her last ECHO was completed. I placed a call to Dr. Aj's office. Dr. Ford was covering for the group. I reviewed the patient's prior ECHO with Dr. Ford and he did not feel that obtaining another ECHO would change the patient's management. He stated that her pulmonary hypertension should be classified as moderate given artery pressure. The patient's CHF is currently compensated, her EKG was unchanged from prior, she was not complaining of chest pain or shortness of breath at the time of my evaluation. She does ambulate on her own at home as per her son, but she does not walk long distances. We agreed that she is medically optimized for IMN placement and we can proceed with the procedure as planned. I received a call from anesthesiology regarding concerns given the pt's history of pulmonary hypertension and valvular abnormalities. The patient did see her cardiologist Dr. Aj on October 18th, her son was unsure when her last ECHO was completed. I placed a call to Dr. Aj's office. Dr. Ford was covering for the group. I reviewed the patient's prior ECHO with Dr. Ford and he did not feel that obtaining another ECHO would change the patient's management. He stated that her pulmonary hypertension should be classified as moderate given artery pressure. The patient's CHF is currently compensated, her EKG was unchanged from prior, she was not complaining of chest pain or shortness of breath at the time of my evaluation. She does ambulate on her own at home as per her son, but she does not walk long distances. We agreed that she is medically optimized for IMN placement and we can proceed with the procedure as planned.    I also spoke to Dr. Ayala who was aware of the patient and spoke to Dr. Escamilla (anesthesiologist) directly. The patient is currently in the OR for the procedure.

## 2021-10-26 NOTE — H&P ADULT - NSHPPHYSICALEXAM_GEN_ALL_CORE
ICU Vital Signs Last 24 Hrs  T(C): 36.7 (25 Oct 2021 23:23), Max: 36.7 (25 Oct 2021 23:23)  T(F): 98.1 (25 Oct 2021 23:23), Max: 98.1 (25 Oct 2021 23:23)  HR: 89 (25 Oct 2021 23:23) (89 - 96)  BP: 125/84 (25 Oct 2021 23:23) (125/84 - 139/85)  BP(mean): 95 (25 Oct 2021 23:23) (95 - 95)  RR: 18 (25 Oct 2021 23:23) (18 - 19)  SpO2: 100% (25 Oct 2021 23:23) (99% - 100%)    General: Awake and alert, cooperative with exam. No acute distress.   Skin: Warm, dry, and pink.   Eyes: Pupils equal and reactive to light. Extraocular eye movements intact. No conjunctival injection, discharge, or scleral icterus.   HEENT: Atraumatic, normocephalic. Moist mucus membranes.   Cardiology: Normal S1, S2. Systolic ejection murmur. Irregularly irregular pulse.   Respiratory: Lungs clear to ascultation bilaterally. Good air exchange. No wheezes, rales, or rhonchi. Normal chest expansion.   Gastrointestinal: Positive bowel sounds. Soft, non-tender, non-distended. No guarding, rigidity, or rebound tenderness. No hepatosplenomegaly.   Musculoskeletal: 5/5 motor strength in all extremities. Normal range of motion.   Extremities: Left lower extremity is externally rotated and shortened. No peripheral edema bilaterally. Dorsalis pedis pulses 2+ bilaterally.   Neurological: A+Ox3 (person, place, and time). Cranial nerves 2-12 intact. Normal speech. No facial droop. No focal neurological deficits.   Psychiatric: Normal affect. Normal mood.

## 2021-10-26 NOTE — CONSULT NOTE ADULT - ASSESSMENT
89 y/o F with PMH A fib on Warfarin, HTN, HLD, hypothyroidism, CHFpEF, pleural effusions, severe pulmonary HTN, mild to moderate mitral regurgitation, severe tricuspid valve regurgitation presents after a fall found to have left femur fracture, pending OR today for IM nail. Consulted by Dr. Pennington   for VTE prophylaxis, risk stratification, and anticoagulation management. patient is high risk for Patient is educated on high risk for VTE due to age, tourniquet use, surgery, immobility, and BMI, moderate bleeding risk    Plan  Hold all chemical AC for OR today  Start Coumadin 5 mg 10 hours post op when patient is stable  Start Heparin 5,000 units SQ Q8hour post op if INR<2.0  Daily PT/INR  Daily CBC/BMP  Enc ambulation  Venodynes  Thanks for the consult will f/u

## 2021-10-26 NOTE — PATIENT PROFILE ADULT - FALL HARM RISK
July 29, 2020      Mihir Frost MD  [address] [facility] [address]           [facility] - Vascular Surgery  [address]  [place] [address]  Phone: [phone]  Fax: [phone]          Patient: Kd Cardenas   MR Number: [id number]   YOB: 1954   Date of Visit: 7/29/2020       Dear Dr. Mihir Frost:    Thank you for referring Kd Cardenas to me for evaluation. Attached you will find relevant portions of my assessment and plan of care.    If you have questions, please do not hesitate to call me. I look forward to following Kd Cardenas along with you.    Sincerely,    Luther Chand MD    Enclosure  CC:  No Recipients    If you would like to receive this communication electronically, please contact [email or web address] or [phone] to request more information on [facility] [facility] access.    For providers and/or their staff who would like to refer a patient to [facility], please contact us through our one-stop-shop provider referral line, [facility], at [phone].    If you feel you have received this communication in error or would no longer like to receive these types of communications, please e-mail [email or web address]         
age(85 years old or older)/bones(Osteoporosis,prev fx,steroid use,metastatic bone ca)/coagulation(Bleeding disorder R/T clinical cond/anti-coags)/other

## 2021-10-26 NOTE — H&P ADULT - ASSESSMENT
89 y/o F presents with left intertrochanteric hip     1. Left intertrochanteric hip fracture secondary to mechanical fall   - Admit to med/surg   - Orthopedics recs appreciated   - OR tomorrow for IMN   - Pain control with Tylenol and Morphine PRN   - Non-weight bearing   - NPO after midnight   - Hold Warfarin, f/u AM INR   - Patient is medically optimized for surgery. She is determined to be an average risk for complications following an average risk procedure. Benefits and risks of surgery discussed with pt at the bedside and her son over the phone.   - Revised cardiac risk index for pre-operative risk = 1 points, class II risk -> 6.0% for a 30 day risk of death, MI, or cardiac arrest     2. Leukocytosis likely reactive   - WBC 14.72, will trend     3. Thrombocytopenia   - , monitor     4. Hyperglycemia   - Glucose 149, monitor closely   - Given pt's age will allow for less strict glucose control   - If persistent elevation will add ISS     5. History of A fib on Warfarin, HTN, HLD, hypothyroidism, CHFpEF, pleural effusions.   - Hold DVT ppx after midnight   - Repeat PT/INR tomorrow AM, will restart after surgery   - c/w other home medications     DVT ppx: Hold DVT ppx until after surgery   Code status: Full code (pt agrees to chest compressions and intubation if required).     Spoke to pt's son Pablo Martinez over the phone. Please inform her son when the procedure is scheduled if possible.

## 2021-10-26 NOTE — H&P ADULT - NSHPSOCIALHISTORY_GEN_ALL_CORE
Lives with her son. Completes ADLs and IADLs on her own. Uses a 3 wheel scooter, also ambulates on her own; sometimes requires things to hold onto. Denies smoking and drug use. Drinks alcohol occasionally.

## 2021-10-26 NOTE — H&P ADULT - NSICDXPASTMEDICALHX_GEN_ALL_CORE_FT
PAST MEDICAL HISTORY:  AF (atrial fibrillation) On Warfarin    Congestive heart failure Preserved EF    HTN (hypertension)     Hyperlipidemia     Hypothyroid     Moderate mitral regurgitation     Pleural effusion     Severe pulmonary hypertension     Severe tricuspid regurgitation

## 2021-10-26 NOTE — CHART NOTE - NSCHARTNOTEFT_GEN_A_CORE
Received a call from PACU nurse stating the pt was tachycardic into the 110's. Will transfer to telemetry and placed cardiology consult.

## 2021-10-26 NOTE — H&P ADULT - NSHPREVIEWOFSYSTEMS_GEN_ALL_CORE
Constitutional: negative for fatigue, negative for fever, negative for chills, negative for decreased appetite.  Skin: negative for rashes, negative for open wounds, negative for jaundice.   Eyes: negative for blurry vision, negative for double vision.   Ears, nose, throat: negative for ear pain, negative for nasal congestion, negative for sore throat, negative for lymph node swelling.   Cardiovascular: negative for chest pain, negative for palpitations, negative for lower extremity swelling.   Respiratory: negative for shortness of breath, negative for wheezing, negative for cough.   Gastrointestinal: negative for abdominal pain, negative for nausea, negative for vomiting, negative for diarrhea, negative for constipation, negative for blood in the stool, negative for black tarry stools.   Genitourinary: negative for burning on urination, negative for urinary urgency or frequency, negative for blood in the urine.   Musculoskeletal: positive for left hip pain, positive for decreased range of motion, negative for muscle/joint pain  Neurological: negative for dizziness, negative for headaches, negative for loss of consciousness, negative for motor weakness, negative for sensory deficits.   Psychiatric: negative for depression, negative for anxiety.

## 2021-10-27 LAB
ANION GAP SERPL CALC-SCNC: 7 MMOL/L — SIGNIFICANT CHANGE UP (ref 5–17)
BUN SERPL-MCNC: 31 MG/DL — HIGH (ref 7–23)
CALCIUM SERPL-MCNC: 8.6 MG/DL — SIGNIFICANT CHANGE UP (ref 8.5–10.1)
CHLORIDE SERPL-SCNC: 106 MMOL/L — SIGNIFICANT CHANGE UP (ref 96–108)
CO2 SERPL-SCNC: 25 MMOL/L — SIGNIFICANT CHANGE UP (ref 22–31)
CREAT SERPL-MCNC: 1.08 MG/DL — SIGNIFICANT CHANGE UP (ref 0.5–1.3)
GLUCOSE SERPL-MCNC: 134 MG/DL — HIGH (ref 70–99)
HCT VFR BLD CALC: 27.4 % — LOW (ref 34.5–45)
HGB BLD-MCNC: 8.7 G/DL — LOW (ref 11.5–15.5)
INR BLD: 1.39 RATIO — HIGH (ref 0.88–1.16)
MCHC RBC-ENTMCNC: 28.9 PG — SIGNIFICANT CHANGE UP (ref 27–34)
MCHC RBC-ENTMCNC: 31.8 GM/DL — LOW (ref 32–36)
MCV RBC AUTO: 91 FL — SIGNIFICANT CHANGE UP (ref 80–100)
PLATELET # BLD AUTO: 101 K/UL — LOW (ref 150–400)
POTASSIUM SERPL-MCNC: 5 MMOL/L — SIGNIFICANT CHANGE UP (ref 3.5–5.3)
POTASSIUM SERPL-SCNC: 5 MMOL/L — SIGNIFICANT CHANGE UP (ref 3.5–5.3)
PROTHROM AB SERPL-ACNC: 16 SEC — HIGH (ref 10.6–13.6)
RBC # BLD: 3.01 M/UL — LOW (ref 3.8–5.2)
RBC # FLD: 14.6 % — HIGH (ref 10.3–14.5)
SODIUM SERPL-SCNC: 138 MMOL/L — SIGNIFICANT CHANGE UP (ref 135–145)
WBC # BLD: 8.09 K/UL — SIGNIFICANT CHANGE UP (ref 3.8–10.5)
WBC # FLD AUTO: 8.09 K/UL — SIGNIFICANT CHANGE UP (ref 3.8–10.5)

## 2021-10-27 PROCEDURE — 99231 SBSQ HOSP IP/OBS SF/LOW 25: CPT

## 2021-10-27 PROCEDURE — 99232 SBSQ HOSP IP/OBS MODERATE 35: CPT

## 2021-10-27 RX ORDER — SODIUM CHLORIDE 9 MG/ML
200 INJECTION INTRAMUSCULAR; INTRAVENOUS; SUBCUTANEOUS ONCE
Refills: 0 | Status: COMPLETED | OUTPATIENT
Start: 2021-10-27 | End: 2021-10-27

## 2021-10-27 RX ORDER — SODIUM CHLORIDE 9 MG/ML
250 INJECTION INTRAMUSCULAR; INTRAVENOUS; SUBCUTANEOUS ONCE
Refills: 0 | Status: COMPLETED | OUTPATIENT
Start: 2021-10-27 | End: 2021-10-27

## 2021-10-27 RX ORDER — METOPROLOL TARTRATE 50 MG
12.5 TABLET ORAL
Refills: 0 | Status: DISCONTINUED | OUTPATIENT
Start: 2021-10-27 | End: 2021-11-01

## 2021-10-27 RX ADMIN — Medication 650 MILLIGRAM(S): at 17:45

## 2021-10-27 RX ADMIN — TRAMADOL HYDROCHLORIDE 50 MILLIGRAM(S): 50 TABLET ORAL at 21:03

## 2021-10-27 RX ADMIN — ATORVASTATIN CALCIUM 10 MILLIGRAM(S): 80 TABLET, FILM COATED ORAL at 21:05

## 2021-10-27 RX ADMIN — SODIUM CHLORIDE 200 MILLILITER(S): 9 INJECTION INTRAMUSCULAR; INTRAVENOUS; SUBCUTANEOUS at 00:05

## 2021-10-27 RX ADMIN — Medication 100 MILLIGRAM(S): at 05:24

## 2021-10-27 RX ADMIN — WARFARIN SODIUM 5 MILLIGRAM(S): 2.5 TABLET ORAL at 21:06

## 2021-10-27 RX ADMIN — Medication 650 MILLIGRAM(S): at 09:53

## 2021-10-27 RX ADMIN — Medication 3 MILLIGRAM(S): at 02:47

## 2021-10-27 RX ADMIN — TRAMADOL HYDROCHLORIDE 50 MILLIGRAM(S): 50 TABLET ORAL at 02:48

## 2021-10-27 RX ADMIN — SENNA PLUS 2 TABLET(S): 8.6 TABLET ORAL at 21:05

## 2021-10-27 RX ADMIN — MIDODRINE HYDROCHLORIDE 2.5 MILLIGRAM(S): 2.5 TABLET ORAL at 05:24

## 2021-10-27 RX ADMIN — Medication 81 MILLIGRAM(S): at 09:51

## 2021-10-27 RX ADMIN — SODIUM CHLORIDE 75 MILLILITER(S): 9 INJECTION, SOLUTION INTRAVENOUS at 02:57

## 2021-10-27 RX ADMIN — MIDODRINE HYDROCHLORIDE 2.5 MILLIGRAM(S): 2.5 TABLET ORAL at 09:52

## 2021-10-27 RX ADMIN — HEPARIN SODIUM 5000 UNIT(S): 5000 INJECTION INTRAVENOUS; SUBCUTANEOUS at 21:05

## 2021-10-27 RX ADMIN — MIDODRINE HYDROCHLORIDE 2.5 MILLIGRAM(S): 2.5 TABLET ORAL at 17:45

## 2021-10-27 RX ADMIN — HEPARIN SODIUM 5000 UNIT(S): 5000 INJECTION INTRAVENOUS; SUBCUTANEOUS at 15:15

## 2021-10-27 RX ADMIN — Medication 100 MICROGRAM(S): at 05:25

## 2021-10-27 RX ADMIN — Medication 12.5 MILLIGRAM(S): at 17:45

## 2021-10-27 RX ADMIN — POLYETHYLENE GLYCOL 3350 17 GRAM(S): 17 POWDER, FOR SOLUTION ORAL at 09:52

## 2021-10-27 RX ADMIN — HEPARIN SODIUM 5000 UNIT(S): 5000 INJECTION INTRAVENOUS; SUBCUTANEOUS at 05:25

## 2021-10-27 RX ADMIN — Medication 6 MILLIGRAM(S): at 21:05

## 2021-10-27 RX ADMIN — Medication 650 MILLIGRAM(S): at 05:24

## 2021-10-27 RX ADMIN — SODIUM CHLORIDE 250 MILLILITER(S): 9 INJECTION INTRAMUSCULAR; INTRAVENOUS; SUBCUTANEOUS at 03:45

## 2021-10-27 RX ADMIN — Medication 100 MILLIGRAM(S): at 15:15

## 2021-10-27 NOTE — DISCHARGE NOTE PROVIDER - PROVIDER TOKENS
PROVIDER:[TOKEN:[[id number]]] PROVIDER:[TOKEN:[[id number]]],PROVIDER:[TOKEN:[[id number]],FOLLOWUP:[1 week]]

## 2021-10-27 NOTE — DISCHARGE NOTE PROVIDER - CARE PROVIDER_API CALL
Nic Pennington (DO)  Orthopaedic Surgery  [address] [place] [address]  Phone: [phone]  Fax: [phone]  Follow Up Time:    Nic Pennington (DO)  Orthopaedic Surgery  [address] [place] [address]  Phone: [phone]  Fax: [phone]  Follow Up Time:     Robbie Ayala (DO; SHIRA)  Cardiology  [address] [place] [address]  Phone: [phone]  Fax: [phone]  Follow Up Time: 1 week

## 2021-10-27 NOTE — PHYSICAL THERAPY INITIAL EVALUATION ADULT - PERTINENT HX OF CURRENT PROBLEM, REHAB EVAL
89 y/o F with PMH A fib on Warfarin, HTN, HLD, hypothyroidism, CHFpEF, pleural effusions, severe pulmonary HTN, mild to moderate mitral regurgitation, severe tricuspid valve regurgitation presents for a fall. Pt s/p LEFT Hip IMN.

## 2021-10-27 NOTE — PROGRESS NOTE ADULT - ASSESSMENT
Assessment:  89y Female s/p LEFT Hip IMN POD1    -Pain control  -VTE ppx per AC Team  -WBAT/OOB with assistance as needed  -PT  -Ice as needed  -Encourage incentive spirometry  -Placed on telemetry unit due to history of afib and HR in the 110's postoperatively. Medical management per primary team  -DC planning  -Will discuss with attending and will advise if plan changes

## 2021-10-27 NOTE — DISCHARGE NOTE PROVIDER - NSDCCPCAREPLAN_GEN_ALL_CORE_FT
PRINCIPAL DISCHARGE DIAGNOSIS  Diagnosis: Hip fracture  Assessment and Plan of Treatment:       SECONDARY DISCHARGE DIAGNOSES  Diagnosis: Fall  Assessment and Plan of Treatment:      PRINCIPAL DISCHARGE DIAGNOSIS  Diagnosis: Hip fracture  Assessment and Plan of Treatment: physical therapy at [facility]  follow up with orthopedics as advised      SECONDARY DISCHARGE DIAGNOSES  Diagnosis: Fall  Assessment and Plan of Treatment:

## 2021-10-27 NOTE — DISCHARGE NOTE PROVIDER - CARE PROVIDERS DIRECT ADDRESSES
[email or web address]

## 2021-10-27 NOTE — DISCHARGE NOTE PROVIDER - HOSPITAL COURSE
Orthopedic Summary  H&P:  Pt is a 89y Female PAST MEDICAL & SURGICAL HISTORY:  AF (atrial fibrillation)  On Warfarin    HTN (hypertension)    Hyperlipidemia    Hypothyroid    Congestive heart failure  Preserved EF    Pleural effusion    Severe pulmonary hypertension    Moderate mitral regurgitation    Severe tricuspid regurgitation    S/P appendectomy          Now s/p Left Hip IM Nail for fracture. Pt is afebrile with stable vital signs. Pain is controlled. Exam reveals intact EHL FHL TA GS, +DP. Dressing is clean and dry.    Hospital Course:  Patient presented to [facility] ED after a fall, found to have a hip fracture, and admitted to the Medical Service. Pt was  medically/cardiac cleared prior to surgery. Prophylactic antibiotics were started before the procedure and continued for 24 hours. They were admitted after surgery to the orthopedic floor.  There were no orthopedic complications during the hospital stay. All home medications were continued.    Routine consults were obtained from the Anticoagulation Team for DVT/PE prophylaxis, from Physical Therapy, and followed by Medicine for Co-management. Patient was placed on  anticoagulation.  Pertinent home medications were continued.  Daily labs were followed.      On POD 0 there were no major issues. Pt received PT daily and was Discharged once cleared per Medicine.  The orthopedic Attending is aware and agrees. See addendum to DC summary per medical team below for any additional info or if any changes. 89 y/o F with PMH A fib on Warfarin, HTN, HLD, hypothyroidism, CHFpEF, pleural effusions, severe pulmonary HTN, mild to moderate mitral regurgitation, severe tricuspid valve regurgitation presents for a fall. The pt reports that her left leg gave out and she fell down onto the kitchen floor.   She was admitted with Left hip fracture.   Underwent IM nail 10/26.   Post op transferred to [facility] for rapid a fib.   required ivf for hypotension.   Hospital course further complicated by acute chf exacerbation requiring iv lasix. noted to have severe aortic stenosis.   CHF improved, diuretics now transitioned to po. She was also treated with abx X 5 days for UTI. Started on remeron for appetite stimulation.   she is medically stable for dc to [facility].     for physical exam please see progress note from 11/1    LABS:                        9.9    8.12  )-----------( 206      ( 01 Nov 2021 09:52 )             31.9     11-01    136  |  105  |  31<H>  ----------------------------<  104<H>  3.7   |  24  |  0.83    Ca    8.5      01 Nov 2021 09:52      PT/INR - ( 01 Nov 2021 07:54 )   PT: 28.5 sec;   INR: 2.57 ratio       Xray Chest 1 View-PORTABLE IMMEDIATE (Xray Chest 1 View-PORTABLE IMMEDIATE .) (10.29.21 @ 10:24) >  IMPRESSION: Mild interstitial congestive change at this time. Heart enlargement again noted.     Xray Knee 3 Views, Left (10.25.21 @ 21:06) >  IMPRESSION: No acute bony pathology.  Note - This does not exclude fractures in perfect alignment and apposition as presence may be indicated at oneto three weeks following callus formation.     Xray Hip w/ Pelvis 2 or 3 Views, Left (10.25.21 @ 21:05) >  IMPRESSION: Left hip fracture.  The patient subsequently underwent ORIFof the left hip.    FINAL DIAGNOSIS:    #Left intertrochanteric hip fracture secondary to mechanical fall   - S/P IM nail POD#6  #Acute blood loss anemia:  #Acute on chronic diastolic CHF  #B/l pleural effusions  #Severe AS  #Parox AFib   #Hyponatremia  #Mild PRIYANKA  #Hyperkalemia  #UTI  #HLD:  #Hypothyroidism    time taken for dc 45 min  d/w pt  summary to be faxed to pcp           89 y/o F with PMH A fib on Warfarin, HTN, HLD, hypothyroidism, CHFpEF, pleural effusions, severe pulmonary HTN, mild to moderate mitral regurgitation, severe tricuspid valve regurgitation presents for a fall. The pt reports that her left leg gave out and she fell down onto the kitchen floor.   She was admitted with Left hip fracture.   Underwent IM nail 10/26.   Post op transferred to [facility] for rapid a fib.   required ivf for hypotension.   Hospital course further complicated by acute chf exacerbation requiring iv lasix. noted to have severe aortic stenosis.   CHF improved, diuretics now transitioned to po. She was also treated with abx X 5 days for UTI. Started on remeron for appetite stimulation. Hospital course also notable for urinary retention requiring licea placed 10/31. she should undergo tov in 2-3 days when more mobile.   she is medically stable for dc to [facility].     for physical exam please see progress note from 11/1    LABS:                        9.9    8.12  )-----------( 206      ( 01 Nov 2021 09:52 )             31.9     11-01    136  |  105  |  31<H>  ----------------------------<  104<H>  3.7   |  24  |  0.83    Ca    8.5      01 Nov 2021 09:52      PT/INR - ( 01 Nov 2021 07:54 )   PT: 28.5 sec;   INR: 2.57 ratio       Xray Chest 1 View-PORTABLE IMMEDIATE (Xray Chest 1 View-PORTABLE IMMEDIATE .) (10.29.21 @ 10:24) >  IMPRESSION: Mild interstitial congestive change at this time. Heart enlargement again noted.     Xray Knee 3 Views, Left (10.25.21 @ 21:06) >  IMPRESSION: No acute bony pathology.  Note - This does not exclude fractures in perfect alignment and apposition as presence may be indicated at oneto three weeks following callus formation.     Xray Hip w/ Pelvis 2 or 3 Views, Left (10.25.21 @ 21:05) >  IMPRESSION: Left hip fracture.  The patient subsequently underwent ORIFof the left hip.    FINAL DIAGNOSIS:    #Left intertrochanteric hip fracture secondary to mechanical fall   - S/P IM nail POD#6  #Acute blood loss anemia:  #Acute on chronic diastolic CHF  #B/l pleural effusions  #Severe AS  #Parox AFib   #Hyponatremia  #Mild PRIYANKA  #Hyperkalemia  #UTI  #HLD:  #Hypothyroidism    time taken for dc 45 min  d/w pt  summary to be faxed to pcp

## 2021-10-27 NOTE — DISCHARGE NOTE PROVIDER - NSDCFUADDINST_GEN_ALL_CORE_FT
IM Nail DC Instructions:    1. ACTIVITY: Weight Bearing as Tolerated with assistance and rolling walker  2. DVT: Continue DVT/PE Prophylaxis. See Med Rec for Duration and dose.  3. PT: daily  4. FOLLOW UP: Orthopedic Surgeon Dr. Pennington in  14 days. Call Office For Appointment.  5. STAPLES: Remove by RN POD14 (11/9/21)  6. BANDAGE: Change bandage on POD7 (11/2/21) to dry gauze and tegaderm or paper tape. Can also change PRN if saturated. Do NOT remove on arrival to inspect wound.

## 2021-10-27 NOTE — PROGRESS NOTE ADULT - ASSESSMENT
89 y/o F with PMH A fib on Warfarin, HTN, HLD, hypothyroidism, CHFpEF, pleural effusions, severe pulmonary HTN, mild to moderate mitral regurgitation, severe tricuspid valve regurgitation presents after a fall found to have left femur fracture, S/P Left femur  IM nail on 10/26/21. Consulted by Dr. Pennington   for VTE prophylaxis, risk stratification, and anticoagulation management. Patient is high risk for VTE due to age, t surgery, immobility, and moderate bleeding risk. Patient with [id number] Score: 6  with no h/o VTE or stroke,and moderate risk for bleeding with plt level of 101, not recommending to bridge with treatment dose of Lovenox       Plan    Coumadin 5 mg tonight and adjust with INR( may need lower dose tomorrow)  Heparin 5,000 units SQ Q8hour post op D/C when INR<2.0  Daily PT/INR  Daily CBC/BMP  Enc ambulation  Venodynes   will f/u  89 y/o F with PMH A fib on Warfarin, HTN, HLD, hypothyroidism, CHFpEF, pleural effusions, severe pulmonary HTN, mild to moderate mitral regurgitation, severe tricuspid valve regurgitation presents after a fall found to have left femur fracture, S/P Left femur  IM nail on 10/26/21. Consulted by Dr. Pennington   for VTE prophylaxis, risk stratification, and anticoagulation management. Patient is high risk for VTE due to age, t surgery, immobility, and moderate bleeding risk. Patient with [id number] Score: 6  with no h/o VTE or stroke,and moderate risk for bleeding with plt level of 101, not recommending to bridge with treatment dose of Lovenox       Plan    Coumadin 5 mg tonight and adjust with INR( may need lower dose tomorrow, usually take 2mg 5times a week  )  Heparin 5,000 units SQ Q8hour post op D/C when INR<2.0  Daily PT/INR  Daily CBC/BMP  Enc ambulation  Venodynes   will f/u

## 2021-10-27 NOTE — PHYSICAL THERAPY INITIAL EVALUATION ADULT - GAIT DEVIATIONS NOTED, PT EVAL
decreased ravi/decreased step length/decreased stride length/decreased weight-shifting ability Cimzia Counseling:  I discussed with the patient the risks of Cimzia including but not limited to immunosuppression, allergic reactions and infections.  The patient understands that monitoring is required including a PPD at baseline and must alert us or the primary physician if symptoms of infection or other concerning signs are noted.

## 2021-10-27 NOTE — PROGRESS NOTE ADULT - ASSESSMENT
Assessment:  89y Female s/p LEFT Hip IMN POD #0    -Pain control  -VTE ppx per AC Team  -WBAT/OOB with assistance as needed  -PT  -Ice as needed  -Encourage incentive spirometry  -Medical management per primary team  -DC planning  -Will discuss with attending and will advise if plan changes.

## 2021-10-27 NOTE — PHYSICAL THERAPY INITIAL EVALUATION ADULT - GENERAL OBSERVATIONS, REHAB EVAL
Pt seen for 45min PT eval session. Pt s/p Lt hip IMN, WBAT. Pt rec'd in semi supine in bed in NAD. pt trans supine>sit mod Ax1. pt trans sit<>Stand mod-max AX1 and RW. pt amb 2steps to chair mod Ax2 and RW assist to advance LLE. Pt dem  dec step length, dec endurance, dec weightshift, and dec ravi. pt left sitting in chair in NAD all needs met, +ice to hip, RN Tricia aware.

## 2021-10-27 NOTE — DISCHARGE NOTE PROVIDER - NSDCMRMEDTOKEN_GEN_ALL_CORE_FT
aspirin 81 mg oral tablet, chewable: 1 tab(s) orally once a day  furosemide 40 mg oral tablet: 1 tab(s) orally once a day  levothyroxine 100 mcg (0.1 mg) oral tablet: 1 tab(s) orally once a day  Melatonin 3 mg oral tablet: 2 tab(s) orally once (at bedtime)  midodrine 2.5 mg oral tablet: 1 tab(s) orally 3 times a day  7am-Noon-5pm***  pravastatin 20 mg oral tablet: 1 tab(s) orally once a day (at bedtime)  spironolactone 25 mg oral tablet: 0.5 tab(s) orally once a day  warfarin 1 mg oral tablet: 1.5 tab(s) orally once a day (at bedtime) on Tuesday and Saturday   warfarin 2 mg oral tablet: 1 tab(s) orally once a day (at bedtime) Monday, Wednesday, Thursday, Friday, and Sunday   acetaminophen 325 mg oral tablet: 3 tab(s) orally every 8 hours, As Needed for mild pain  aspirin 81 mg oral tablet, chewable: 1 tab(s) orally once a day  furosemide 20 mg oral tablet: 1 tab(s) orally once a day  levothyroxine 100 mcg (0.1 mg) oral tablet: 1 tab(s) orally once a day  Melatonin 3 mg oral tablet: 2 tab(s) orally once (at bedtime)  metoprolol: 12.5 milligram(s) orally 2 times a day  midodrine 5 mg oral tablet: 1 tab(s) orally 3 times a day  mirtazapine 7.5 mg oral tablet: 1 tab(s) orally once a day (at bedtime)  polyethylene glycol 3350 oral powder for reconstitution: 17 gram(s) orally once a day  pravastatin 20 mg oral tablet: 1 tab(s) orally once a day (at bedtime)  senna oral tablet: 2 tab(s) orally once a day (at bedtime)  spironolactone 25 mg oral tablet: 0.5 tab(s) orally once a day  traMADol 50 mg oral tablet: 1 tab(s) orally every 6 hours, As needed, Severe Pain (7 - 10)  warfarin 1 mg oral tablet: 1.5mg PO daily, hold for INR 3.0 or &gt;

## 2021-10-27 NOTE — PHYSICAL THERAPY INITIAL EVALUATION ADULT - ACTIVE RANGE OF MOTION EXAMINATION, REHAB EVAL
LLE limited 2/2 pain/bilateral upper extremity Active ROM was WFL (within functional limits)/Right LE Active ROM was WFL (within functional limits)

## 2021-10-27 NOTE — PHYSICAL THERAPY INITIAL EVALUATION ADULT - ASSISTIVE DEVICE, REHAB EVAL
History     Chief Complaint:  Abdominal Pain    HPI   Lydia Pierson is a 61 year old female with a history of renal cell carcinoma, s/p left partial nephrectomy, and lymphocytic leukemia in remission, who presents for evaluation of upper abdominal pain. This pain started suddenly around noon today while the patient was lying down, trying to nap. It was associated with nausea, but does not radiate into her back. At home, she did try taking Gas X for her symptoms however the pain progressively worsened, so she first presented to Urgent Care however they promptly referred her to the ED for additional evaluation. She reports one episode of emesis while at Urgent Care, however she does not know if there was blood present. Currently, she states the pain is diffuse across the upper abdomen and does not localize more to one side; she denies any prior history of this pain. She also denies any associated fevers, urinary symptoms, or stool changes. Her last bowel movement was yesterday and this was normal for her. She also denies any cough, shortness of breath, or chest pain. She does have a history of multiple abdominal surgeries, but does still have her appendix and gallbladder.     Allergies:  NKDA    Medications:    The patient is currently on no regular medications.     Past Medical History:    Lymphoid leukemia   Renal cell carcinoma  Hyperlipidemia   External hemorrhoids   Ovarian cysts    Past Surgical History:    Knee arthroscopy with medial menisectomy  Hemorrhoidectomy  Inguinal herniorrhaphy, left x2  Left partial nephrectomy   Right salpingo-oophorectomy  Tubal ligation  Vein stripping  Dental extraction  T & A     Family History:    Father: CAD, bypass, Diabetes, Hyperlipidemia  Sister: Seizures, Asthma    Social History:  Marital Status:   [2]  Presents to the ED with .   Primary Clinic: [facility]  Negative for tobacco use.  Negative for alcohol use.  Negative for drug use.       Review of Systems   Constitutional: Negative for fever.   Respiratory: Negative for cough and shortness of breath.    Cardiovascular: Negative for chest pain.   Gastrointestinal: Positive for abdominal pain, nausea and vomiting. Negative for blood in stool, constipation and diarrhea.   Genitourinary: Negative for difficulty urinating.   Musculoskeletal: Negative for back pain.   All other systems reviewed and are negative.        Physical Exam     Patient Vitals for the past 24 hrs:   BP Temp Temp src Pulse Resp SpO2 Weight   12/29/19 2130 (!) 146/78 -- -- 77 -- 95 % --   12/29/19 2115 (!) 147/76 -- -- 76 -- 94 % --   12/29/19 2100 125/57 -- -- 80 -- 98 % --   12/29/19 2045 (!) 149/80 -- -- 75 -- 95 % --   12/29/19 2030 (!) 151/81 -- -- 72 -- 94 % --   12/29/19 2015 138/75 -- -- 74 -- 97 % --   12/29/19 2000 (!) 150/80 -- -- 75 -- 96 % --   12/29/19 1945 (!) 146/79 -- -- 70 -- 94 % --   12/29/19 1930 (!) 153/81 -- -- 72 -- 94 % --   12/29/19 1915 (!) 150/82 -- -- 72 -- 99 % --   12/29/19 1900 (!) 154/86 -- -- 67 -- 100 % --   12/29/19 1707 139/83 97.6  F (36.4  C) Oral 65 18 100 % 54.6 kg (120 lb 5.9 oz)      Physical Exam    Nursing note and vitals reviewed.    Constitutional: Pleasant and well groomed. Appears uncomfortable.          HENT:    Mouth/Throat: Oropharynx is without swelling or erythema. Oral mucosa dry.    Eyes: Conjunctivae are normal. No scleral icterus.    Neck: Neck supple.   Cardiovascular: Normal rate, regular rhythm and intact distal pulses.    Pulmonary/Chest: Effort normal and breath sounds normal.   Abdominal: Soft.  No distension. Diffusely tender greater in upper abdomen with guarding.   Musculoskeletal:  No edema, No calf tenderness  Neurological:Alert and oriented. Coordination normal.   Skin: Skin is warm and dry.   Psychiatric: Normal mood and affect.     Emergency Department Course   Imaging:  Radiographic findings were communicated with the patient who voiced understanding of the  findings.  CT Abdomen/Pelvis with IV contrast:    Partial small bowel obstruction secondary to areas of abnormal enhancement and narrowing in small bowel loops which are suggestive of enteritis or Crohn's disease and partial obstruction. No definite intra-abdominal abscess or interloop fistula, as per radiology.    Laboratory:  CBC: WBC: 9.6, HGB: 14.6, PLT: 210  CMP: Glucose 107 (H), o/w WNL (Creatinine: 0.55)  Lipase: 82    ISTAT basic met ica hematocrit POCT: Glucose: 110 (H), Creatinine: 0.5 (L), o/w WNL    UA with Microscopic: Ketones: 10 (A), pH: 8.5 (H), Amorphous crystals: Moderate (A), o/w WNL     Procedures:  None    Interventions:  1827 NS 1L IV   1831 Dilaudid, 0.5 mg, IV injection   Zofran, 4 mg, IV injection   1910 Dilaudid, 0.5 mg, IV injection   2056 Dilaudid, 0.5 mg, IV injection     Emergency Department Course:  Nursing notes and vitals reviewed.   1808: I performed an exam of the patient as documented above.      IV was inserted and blood was drawn for laboratory testing, results above.   The patient provided a urine sample here in the emergency department. This was sent for laboratory testing, findings above.   The patient was sent for a CT abdomen/pelvis while in the emergency department, results above.      1934: I rechecked the patient and discussed the results of her workup thus far.     2022: As there are currently no beds available at [facility], I discussed the possibility of transfer. She prefers [facility] over other hospitals.     2040: I consulted with Dr. Corrales of the hospitalist services at [facility]. He is in agreement to accept the patient for admission.    Findings and plan explained to the Patient. Patient will be transferred to [facility] via EMS. Discussed the case with Dr. Corrales, who will admit the patient to a monitored bed for further monitoring, evaluation, and treatment.     I personally reviewed the laboratory and imaging results with the Patient and  answered all related questions prior to transfer.    Impression & Plan      Medical Decision Making:  Lydia Pierson is a 61 year old female who presents for evaluation of acute onset of abdominal pain.  The differential diagnosis included but was not limited to bowel obstruction, pancreatitis, cholecystitis, biliary colic, ischemic bowel.     The clinical presentation is consistent with the partial small bowel obstruction which was identified by CT. There was some concern about findings concerning for inflammatory bowel disease. This was communicated to the patient. There are no signs of surgical emergencies or intraabdominal catastrophes such as volvulus, gut ischemia, perforation, etc.      Patient has been resuscitated with IVF. Symptoms have improved with antiemetics and parenteral pain medications.    The patient will be admitted for bowel rest, continued IV hydration, and symptom management. There was no immediate indication for surgical consultation in the ED. There was no bed available at [facility] and therefore [facility] was called to request a bed for transfer for direct admission.  I have spoken with Dr. Corrales who accepted the patient for admission for ongoing evaluation, monitoring and management.     Plan:   Transfer to [facility] by ambulance for direct admit to Dr. Corrales for ongoing evaluation, monitoring and management.     Diagnosis:    ICD-10-CM    1. Small bowel obstruction (H) K56.609     partial   2. Abdominal pain, generalized R10.84        Disposition:  Transferred to [facility] via EMS    Scribe Disclosure:  I, Estela Rigoberto, am serving as a scribe on 12/29/2019 at 6:08 PM to personally document services performed by Roya Vivar MD based on my observations and the provider's statements to me.     12/29/2019   [facility] EMERGENCY DEPARTMENT       Roya Vivar MD  12/31/19 [id number]     bed rails

## 2021-10-28 LAB
ADD ON TEST-SPECIMEN IN LAB: SIGNIFICANT CHANGE UP
ANION GAP SERPL CALC-SCNC: 5 MMOL/L — SIGNIFICANT CHANGE UP (ref 5–17)
BUN SERPL-MCNC: 49 MG/DL — HIGH (ref 7–23)
CALCIUM SERPL-MCNC: 8.7 MG/DL — SIGNIFICANT CHANGE UP (ref 8.5–10.1)
CHLORIDE SERPL-SCNC: 101 MMOL/L — SIGNIFICANT CHANGE UP (ref 96–108)
CO2 SERPL-SCNC: 26 MMOL/L — SIGNIFICANT CHANGE UP (ref 22–31)
CREAT SERPL-MCNC: 1.42 MG/DL — HIGH (ref 0.5–1.3)
GLUCOSE SERPL-MCNC: 111 MG/DL — HIGH (ref 70–99)
HCT VFR BLD CALC: 26.8 % — LOW (ref 34.5–45)
HGB BLD-MCNC: 8.4 G/DL — LOW (ref 11.5–15.5)
INR BLD: 1.63 RATIO — HIGH (ref 0.88–1.16)
MCHC RBC-ENTMCNC: 28.4 PG — SIGNIFICANT CHANGE UP (ref 27–34)
MCHC RBC-ENTMCNC: 31.3 GM/DL — LOW (ref 32–36)
MCV RBC AUTO: 90.5 FL — SIGNIFICANT CHANGE UP (ref 80–100)
NT-PROBNP SERPL-SCNC: HIGH PG/ML (ref 0–450)
PLATELET # BLD AUTO: 120 K/UL — LOW (ref 150–400)
POTASSIUM SERPL-MCNC: 5.2 MMOL/L — SIGNIFICANT CHANGE UP (ref 3.5–5.3)
POTASSIUM SERPL-SCNC: 5.2 MMOL/L — SIGNIFICANT CHANGE UP (ref 3.5–5.3)
PROTHROM AB SERPL-ACNC: 18.5 SEC — HIGH (ref 10.6–13.6)
RBC # BLD: 2.96 M/UL — LOW (ref 3.8–5.2)
RBC # FLD: 14.5 % — SIGNIFICANT CHANGE UP (ref 10.3–14.5)
SODIUM SERPL-SCNC: 132 MMOL/L — LOW (ref 135–145)
WBC # BLD: 10.74 K/UL — HIGH (ref 3.8–10.5)
WBC # FLD AUTO: 10.74 K/UL — HIGH (ref 3.8–10.5)

## 2021-10-28 PROCEDURE — 99231 SBSQ HOSP IP/OBS SF/LOW 25: CPT

## 2021-10-28 PROCEDURE — 99232 SBSQ HOSP IP/OBS MODERATE 35: CPT

## 2021-10-28 RX ORDER — WARFARIN SODIUM 2.5 MG/1
3 TABLET ORAL DAILY
Refills: 0 | Status: DISCONTINUED | OUTPATIENT
Start: 2021-10-28 | End: 2021-10-29

## 2021-10-28 RX ORDER — SODIUM CHLORIDE 9 MG/ML
1000 INJECTION INTRAMUSCULAR; INTRAVENOUS; SUBCUTANEOUS
Refills: 0 | Status: COMPLETED | OUTPATIENT
Start: 2021-10-28 | End: 2021-10-29

## 2021-10-28 RX ORDER — CEFUROXIME AXETIL 250 MG
250 TABLET ORAL EVERY 12 HOURS
Refills: 0 | Status: DISCONTINUED | OUTPATIENT
Start: 2021-10-28 | End: 2021-10-29

## 2021-10-28 RX ADMIN — Medication 250 MILLIGRAM(S): at 21:44

## 2021-10-28 RX ADMIN — Medication 81 MILLIGRAM(S): at 09:04

## 2021-10-28 RX ADMIN — HEPARIN SODIUM 5000 UNIT(S): 5000 INJECTION INTRAVENOUS; SUBCUTANEOUS at 21:44

## 2021-10-28 RX ADMIN — MIDODRINE HYDROCHLORIDE 2.5 MILLIGRAM(S): 2.5 TABLET ORAL at 06:36

## 2021-10-28 RX ADMIN — SENNA PLUS 2 TABLET(S): 8.6 TABLET ORAL at 21:44

## 2021-10-28 RX ADMIN — SODIUM CHLORIDE 75 MILLILITER(S): 9 INJECTION INTRAMUSCULAR; INTRAVENOUS; SUBCUTANEOUS at 15:59

## 2021-10-28 RX ADMIN — Medication 650 MILLIGRAM(S): at 17:44

## 2021-10-28 RX ADMIN — MIDODRINE HYDROCHLORIDE 2.5 MILLIGRAM(S): 2.5 TABLET ORAL at 17:39

## 2021-10-28 RX ADMIN — Medication 650 MILLIGRAM(S): at 06:40

## 2021-10-28 RX ADMIN — MIDODRINE HYDROCHLORIDE 2.5 MILLIGRAM(S): 2.5 TABLET ORAL at 14:28

## 2021-10-28 RX ADMIN — ATORVASTATIN CALCIUM 10 MILLIGRAM(S): 80 TABLET, FILM COATED ORAL at 21:44

## 2021-10-28 RX ADMIN — POLYETHYLENE GLYCOL 3350 17 GRAM(S): 17 POWDER, FOR SOLUTION ORAL at 09:04

## 2021-10-28 RX ADMIN — HEPARIN SODIUM 5000 UNIT(S): 5000 INJECTION INTRAVENOUS; SUBCUTANEOUS at 06:37

## 2021-10-28 RX ADMIN — Medication 12.5 MILLIGRAM(S): at 17:39

## 2021-10-28 RX ADMIN — Medication 650 MILLIGRAM(S): at 14:28

## 2021-10-28 RX ADMIN — WARFARIN SODIUM 3 MILLIGRAM(S): 2.5 TABLET ORAL at 21:44

## 2021-10-28 RX ADMIN — Medication 6 MILLIGRAM(S): at 21:43

## 2021-10-28 RX ADMIN — Medication 250 MILLIGRAM(S): at 15:56

## 2021-10-28 RX ADMIN — Medication 100 MICROGRAM(S): at 06:42

## 2021-10-28 RX ADMIN — HEPARIN SODIUM 5000 UNIT(S): 5000 INJECTION INTRAVENOUS; SUBCUTANEOUS at 15:56

## 2021-10-28 RX ADMIN — Medication 650 MILLIGRAM(S): at 09:03

## 2021-10-28 RX ADMIN — Medication 650 MILLIGRAM(S): at 12:53

## 2021-10-28 NOTE — CONSULT NOTE ADULT - CONSULT REASON
congestive heart failure  AS
called 1000 seen 1030
DVT/PE prophylaxis, risk stratification and Anticoagulation Management

## 2021-10-28 NOTE — CDI QUERY NOTE - NSCDIOTHERTXTBX2_GEN_ALL_CORE_FT
Sodium levels as follows:    Historical Values  Sodium, Serum: 132 mmol/L (10.28.21 @ 08:24)   Sodium, Serum: 138 mmol/L (10.27.21 @ 08:23)   Sodium, Serum: 137 mmol/L (10.26.21 @ 22:47)   Sodium, Serum: 141 mmol/L (10.26.21 @ 07:55)   Sodium, Serum: 135 mmol/L (10.25.21 @ 20:11)     Is the sodium level of 132 indicative of a diagnosis  a) Hyponatremia  b) Other clinical diagnosis  c) Lab value not clinically significant

## 2021-10-28 NOTE — PROGRESS NOTE ADULT - ASSESSMENT
89 y/o F with PMH A fib on Warfarin, HTN, HLD, hypothyroidism, CHFpEF, pleural effusions, severe pulmonary HTN, mild to moderate mitral regurgitation, severe tricuspid valve regurgitation presents after a fall found to have left femur fracture, S/P Left femur  IM nail on 10/26/21. Consulted by Dr. Pennington   for VTE prophylaxis, risk stratification, and anticoagulation management. Patient is high risk for VTE due to age, t surgery, immobility, and moderate bleeding risk. Patient with [id number] Score: 6  with no h/o VTE or stroke,and moderate risk for bleeding with plt level of 101, not recommending to bridge with treatment dose of Lovenox       Plan:  ::Decrease warfarin to 3mg PO tonight, check INR in am. 1.6 from 1.3 after one dose 5mg 10/27.  ::Heparin 5,000 units SQ Q8hour post op D/C when INR<2.0  ::Daily PT/INR  ::Daily CBC/BMP  ::Enc ambulation  ::[place]     Will continue to follow.  Dispo: [facility]

## 2021-10-28 NOTE — CDI QUERY NOTE - NSCDIOTHERTXTBX3_GEN_ALL_CORE_FT
Creatinine levels as follows:    Historical Values  Creatinine, Serum: 1.42 mg/dL (10.28.21 @ 08:24)   Creatinine, Serum: 1.08 mg/dL (10.27.21 @ 08:23)   Creatinine, Serum: 0.98 mg/dL (10.26.21 @ 22:47)   Creatinine, Serum: 0.97 mg/dL (10.26.21 @ 07:55)   Creatinine, Serum: 1.17 mg/dL (10.25.21 @ 20:11)    Are  the above creatinine levels indicative of a diagnosis  a) PRIYANKA, status post surgery  b) Other clinical diagnosis  c) No clinical evidence of PRIYANKA  d) Lab value not clinically significant

## 2021-10-28 NOTE — PROGRESS NOTE ADULT - ASSESSMENT
Assessment:  89y Female s/p LEFT Hip IMN POD2    -Pain control  -VTE ppx per AC Team  -WBAT/OOB with assistance as needed  -PT  -Ice as needed  -Encourage incentive spirometry  -Placed on telemetry unit due to history of afib and HR in the 110's postoperatively. Drop in H/H POD1, monitor H/H and transfuse as needed. Medical management per primary team  -DC planning  -Will discuss with attending and will advise if plan changes

## 2021-10-28 NOTE — CDI QUERY NOTE - NSCDIOTHERTXTBX_GEN_ALL_CORE_HH
Clinical documentation indicates that this patient has atrial fibrillation.     Please clarify the type of Afib  a) Chronic:  b) Paroxysmal:   c) Permanent:    d) Persistent:   e) Other:  f) Unknown:    SUPPORTING DOCUMENTATION AND/OR CLINICAL EVIDENCE:    H+P : History of Present Illness:   89 y/o F with PMH A fib on Warfarin    PN 10/27 "  PAF with rapid a fib earlier today.   -start low dose bb for better rate control.   -on coumadin"

## 2021-10-28 NOTE — CONSULT NOTE ADULT - SUBJECTIVE AND OBJECTIVE BOX
Patient is a 89y old  Female who presents with a chief complaint of Fall, left hip pain (28 Oct 2021 06:11)    ________________________________  ELIZABETH LACY is a 89y year old Female with a past medical history of   A fib on Warfarin, HTN, HLD, hypothyroidism, CHFpEF, pleural effusions, severe pulmonary HTN, mild to moderate mitral regurgitation, severe tricuspid valve regurgitation presents for a fall. The pt reports that her left leg gave out and she fell down onto the kitchen floor. She believes this occurred around 2 pm and her son did not come home and find her until 4 pm. She did not lose consciousness. Does report hitting her head on the floor. She is complaining of 10/10 pain located at the left hip described as an ache. She does use a scooter to at home adn does walk around on her own but will try to hold onto something. Does not walk very long distances. Denies chest pain or shortness of breath at rest or on exertion, fevers, chills, abdominal pain, N/V, diarrhea/constipation.     ER course: VSS. Labs: WBC 14.72, , INR 2.80, BUN 31, Glucose 149. EKG: Atrial fibrillation with HR 89 bpm, QTC prolonged to 498, LVH, T wave inversions in V5 and V6 are unchanged from prior EKG on 10/29/2020 (personally reviewed).     Imaging:   - XR left knee: joint space narrowing, sclerosis suggesting degenerative joint disease (personally reviewed).   - XR left femur: left femur fracture (personally reviewed).   - XR hip with pelvis: Left femur fracture (personally reviewed).   - CXR: hyperinflated lung fields, flattened diaphragms, no consolidation, no effusion, no pneumothorax (personally reviewed).   - CT head: Increased parenchymal volume loss. No acute hemorrhage mass or mass effect.        ________________________________  Review of systems: A 10 point review of system has been performed, and is negative except for what has been mentioned in the above history of present illness.     PAST MEDICAL & SURGICAL HISTORY:  AF (atrial fibrillation)  On Warfarin    HTN (hypertension)    Hyperlipidemia    Hypothyroid    Congestive heart failure  Preserved EF    Pleural effusion    Severe pulmonary hypertension    Moderate mitral regurgitation    Severe tricuspid regurgitation    S/P appendectomy      FAMILY HISTORY:  FH: lung cancer (Father)       The patient denies any history of premature CAD or sudden cardiac death.    SOCIAL HISTORY: The patient denies any history of tobacco abuse, alcohol abuse or illicit drug use.    ALLERGIES:  Bactrim (Unknown)    Home Medications:  Melatonin 3 mg oral tablet: 2 tab(s) orally once (at bedtime) (26 Oct 2021 00:47)  pravastatin 20 mg oral tablet: 1 tab(s) orally once a day (at bedtime) (26 Oct 2021 00:47)  spironolactone 25 mg oral tablet: 0.5 tab(s) orally once a day (26 Oct 2021 00:47)  warfarin 1 mg oral tablet: 1.5 tab(s) orally once a day (at bedtime) on Tuesday and Saturday  (26 Oct 2021 00:47)  warfarin 2 mg oral tablet: 1 tab(s) orally once a day (at bedtime) Monday, Wednesday, Thursday, Friday, and  (26 Oct 2021 00:47)    MEDICATIONS  (STANDING):  acetaminophen     Tablet .. 650 milliGRAM(s) Oral every 6 hours  aspirin  chewable 81 milliGRAM(s) Oral daily  atorvastatin 10 milliGRAM(s) Oral at bedtime  heparin   Injectable 5000 Unit(s) SubCutaneous every 8 hours  levothyroxine 100 MICROGram(s) Oral daily  melatonin 6 milliGRAM(s) Oral at bedtime  metoprolol tartrate 12.5 milliGRAM(s) Oral two times a day  midodrine. 2.5 milliGRAM(s) Oral three times a day  polyethylene glycol 3350 17 Gram(s) Oral daily  senna 2 Tablet(s) Oral at bedtime  warfarin 3 milliGRAM(s) Oral daily    MEDICATIONS  (PRN):  benzocaine 15 mG/menthol 3.6 mG (Sugar-Free) Lozenge 1 Lozenge Oral four times a day PRN Sore Throat  morphine  - Injectable 4 milliGRAM(s) IV Push every 4 hours PRN Severe Pain (7 - 10)  ondansetron   Disintegrating Tablet 4 milliGRAM(s) Oral three times a day PRN Nausea and/or Vomiting  traMADol 50 milliGRAM(s) Oral every 4 hours PRN Moderate Pain (4 - 6)  traMADol 100 milliGRAM(s) Oral every 4 hours PRN Severe Pain (7 - 10)    Vital Signs Last 24 Hrs  T(C): 36.4 (28 Oct 2021 08:19), Max: 37 (27 Oct 2021 23:32)  T(F): 97.5 (28 Oct 2021 08:19), Max: 98.6 (27 Oct 2021 23:32)  HR: 90 (28 Oct 2021 08:19) (74 - 90)  BP: 136/59 (28 Oct 2021 08:19) (91/56 - 136/59)  BP(mean): --  RR: 18 (28 Oct 2021 08:19) (18 - 19)  SpO2: 100% (28 Oct 2021 08:19) (98% - 100%)  I&O's Summary    ________________________________  GENERAL APPEARANCE:  No acute distress  HEAD: normocephalic, atraumatic  NECK: supple, no jugular venous distention, no carotid bruit    HEART: Regular rate and rhythm, S1, S2 normal, 1/6 murmur    CHEST:  No anterior chest wall tenderness    LUNGS:  Clear to auscultation, without any wheezing, rhonchi or rales    ABDOMEN: soft, nontender, nondistended, with positive bowel sounds appreciated  EXTREMITIES: no edema.   NEURO: Alert and oriented x3  PSYC:  Normal affect  SKIN:  Dry  ________________________________   TELEMETRY:    ECG:    LABS:                        8.4    10.74 )-----------( 120      ( 28 Oct 2021 08:24 )             26.8             10-28    132<L>  |  101  |  49<H>  ----------------------------<  111<H>  5.2   |  26  |  1.42<H>    Ca    8.7      28 Oct 2021 08:24        PT/INR - ( 28 Oct 2021 10:50 )   PT: 18.5 sec;   INR: 1.63 ratio           Urinalysis Basic - ( 26 Oct 2021 18:15 )    Color: Yellow / Appearance: Slightly Turbid / S.025 / pH: x  Gluc: x / Ketone: Trace  / Bili: Negative / Urobili: Negative mg/dL   Blood: x / Protein: 15 mg/dL / Nitrite: Negative   Leuk Esterase: Moderate / RBC: 0-2 /HPF / WBC 26-50   Sq Epi: x / Non Sq Epi: Moderate / Bacteria: Moderate        PT/INR - ( 28 Oct 2021 10:50 )   PT: 18.5 sec;   INR: 1.63 ratio           Urinalysis Basic - ( 26 Oct 2021 18:15 )    Color: Yellow / Appearance: Slightly Turbid / S.025 / pH: x  Gluc: x / Ketone: Trace  / Bili: Negative / Urobili: Negative mg/dL   Blood: x / Protein: 15 mg/dL / Nitrite: Negative   Leuk Esterase: Moderate / RBC: 0-2 /HPF / WBC 26-50   Sq Epi: x / Non Sq Epi: Moderate / Bacteria: Moderate             ________________________________    RADIOLOGY & ADDITIONAL STUDIES:   ________________________________    ASSESSMENT:      PLAN:       __________________________________________________________________________  Thank you for allowing me to participate in the care of your patient. Please contact me should any questions arise.    ALFONZO Ayala DO, FACC   Patient is a 89y old  Female who presents with a chief complaint of Fall, left hip pain (28 Oct 2021 06:11)    ________________________________  ELIZABETH LACY is a 89y year old Female with a past medical history  chronic diastolic heart failure no prior history of pleural effusion, chronic atrial fibrillation on anticoagulation with warfarin, hypertension, hyperlipidemia, moderate mitral valve regurgitation, severe aortic stenosis, with severe pulmonary hypertension who is known to our practice.  She now presents status post fall, resulting in a left hip fracture.  She states that her leg gave out and she fell in the kitchen floor.  She did not lose consciousness.  She denies any head trauma.  She is status post hip surgery.    Cardiology consulted for management of chronic diastolic heart failure.    Currently she denies any chest pain or shortness of breath.  She denies any dizziness or lightheadedness.  Arteries been stable on telemetry.           ________________________________  Review of systems: A 10 point review of system has been performed, and is negative except for what has been mentioned in the above history of present illness.     PAST MEDICAL & SURGICAL HISTORY:  AF (atrial fibrillation)  On Warfarin    HTN (hypertension)    Hyperlipidemia    Hypothyroid    Congestive heart failure  Preserved EF    Pleural effusion    Severe pulmonary hypertension    Moderate mitral regurgitation    Severe tricuspid regurgitation    S/P appendectomy      FAMILY HISTORY:  FH: lung cancer (Father)       The patient denies any history of premature CAD or sudden cardiac death.    SOCIAL HISTORY: The patient denies any history of tobacco abuse, alcohol abuse or illicit drug use.    ALLERGIES:  Bactrim (Unknown)    Home Medications:  Melatonin 3 mg oral tablet: 2 tab(s) orally once (at bedtime) (26 Oct 2021 00:47)  pravastatin 20 mg oral tablet: 1 tab(s) orally once a day (at bedtime) (26 Oct 2021 00:47)  spironolactone 25 mg oral tablet: 0.5 tab(s) orally once a day (26 Oct 2021 00:47)  warfarin 1 mg oral tablet: 1.5 tab(s) orally once a day (at bedtime) on Tuesday and Saturday  (26 Oct 2021 00:47)  warfarin 2 mg oral tablet: 1 tab(s) orally once a day (at bedtime) Monday, Wednesday, Thursday, Friday, and  (26 Oct 2021 00:47)    MEDICATIONS  (STANDING):  acetaminophen     Tablet .. 650 milliGRAM(s) Oral every 6 hours  aspirin  chewable 81 milliGRAM(s) Oral daily  atorvastatin 10 milliGRAM(s) Oral at bedtime  heparin   Injectable 5000 Unit(s) SubCutaneous every 8 hours  levothyroxine 100 MICROGram(s) Oral daily  melatonin 6 milliGRAM(s) Oral at bedtime  metoprolol tartrate 12.5 milliGRAM(s) Oral two times a day  midodrine. 2.5 milliGRAM(s) Oral three times a day  polyethylene glycol 3350 17 Gram(s) Oral daily  senna 2 Tablet(s) Oral at bedtime  warfarin 3 milliGRAM(s) Oral daily    MEDICATIONS  (PRN):  benzocaine 15 mG/menthol 3.6 mG (Sugar-Free) Lozenge 1 Lozenge Oral four times a day PRN Sore Throat  morphine  - Injectable 4 milliGRAM(s) IV Push every 4 hours PRN Severe Pain (7 - 10)  ondansetron   Disintegrating Tablet 4 milliGRAM(s) Oral three times a day PRN Nausea and/or Vomiting  traMADol 50 milliGRAM(s) Oral every 4 hours PRN Moderate Pain (4 - 6)  traMADol 100 milliGRAM(s) Oral every 4 hours PRN Severe Pain (7 - 10)    Vital Signs Last 24 Hrs  T(C): 36.4 (28 Oct 2021 08:19), Max: 37 (27 Oct 2021 23:32)  T(F): 97.5 (28 Oct 2021 08:19), Max: 98.6 (27 Oct 2021 23:32)  HR: 90 (28 Oct 2021 08:19) (74 - 90)  BP: 136/59 (28 Oct 2021 08:19) (91/56 - 136/59)  BP(mean): --  RR: 18 (28 Oct 2021 08:19) (18 - 19)  SpO2: 100% (28 Oct 2021 08:19) (98% - 100%)  I&O's Summary    ________________________________  GENERAL APPEARANCE:  No acute distress  HEAD: normocephalic, atraumatic  NECK: supple, no jugular venous distention, no carotid bruit    HEART: Irregularly irregular, S1, S2 normal, 1/6 murmur    CHEST:  No anterior chest wall tenderness    LUNGS: Trace crackles at right base    ABDOMEN: soft, nontender, nondistended, with positive bowel sounds appreciated  EXTREMITIES: no edema.   NEURO: Alert and oriented x3  PSYC:  Normal affect  SKIN:  Dry  ________________________________   TELEMETRY: Rate controlled A. fib    ECG: Atrial fibrillation with nonspecific ST-T wave changes, which appear to be chronic    LABS:                        8.4    10.74 )-----------( 120      ( 28 Oct 2021 08:24 )             26.8             10-28    132<L>  |  101  |  49<H>  ----------------------------<  111<H>  5.2   |  26  |  1.42<H>    Ca    8.7      28 Oct 2021 08:24        PT/INR - ( 28 Oct 2021 10:50 )   PT: 18.5 sec;   INR: 1.63 ratio           Urinalysis Basic - ( 26 Oct 2021 18:15 )    Color: Yellow / Appearance: Slightly Turbid / S.025 / pH: x  Gluc: x / Ketone: Trace  / Bili: Negative / Urobili: Negative mg/dL   Blood: x / Protein: 15 mg/dL / Nitrite: Negative   Leuk Esterase: Moderate / RBC: 0-2 /HPF / WBC 26-50   Sq Epi: x / Non Sq Epi: Moderate / Bacteria: Moderate        PT/INR - ( 28 Oct 2021 10:50 )   PT: 18.5 sec;   INR: 1.63 ratio           Urinalysis Basic - ( 26 Oct 2021 18:15 )    Color: Yellow / Appearance: Slightly Turbid / S.025 / pH: x  Gluc: x / Ketone: Trace  / Bili: Negative / Urobili: Negative mg/dL   Blood: x / Protein: 15 mg/dL / Nitrite: Negative   Leuk Esterase: Moderate / RBC: 0-2 /HPF / WBC 26-50   Sq Epi: x / Non Sq Epi: Moderate / Bacteria: Moderate             ________________________________    RADIOLOGY & ADDITIONAL STUDIES: IMPRESSION:  Small left effusion.    IMPRESSION: Left hip fracture.    The patient subsequently underwent ORIFof the left hip.    Echocardiogram: 2020-  --There is severe left atrial dilatation (LA volume index 65 ml/m²).  --There is mild left ventricular hypertrophy.  --LV global wall motion is hypokinetic.  --LV ejection fraction (50 %) is borderline normal.  --Left ventricular ejection fraction 50 to 55%. There is asymmetric apical hypertrophy present.  --There is severe right atrial dilatation.  --The aortic root is normal in size (3.40 cm). Ascending aorta is dilated; its diameter is 3.90   cm (2.6 cm/m²).  --The aortic valve is moderately calcified. There is moderate aortic stenosis. The aortic valve   area, by the continuity equation, is 1.03 cm². There is mild aortic regurgitation.  --There is mild to moderate mitral regurgitation.  --There is moderate to severe tricuspid regurgitation.  --There is mild pulmonic regurgitation.  --The right atrial pressure is elevated (11-20 mm Hg). There is severe pulmonary hypertension.  --There is a trace pericardial effusion. Left pleural effusion is present. Right pleural   effusion is present.      ________________________________    ASSESSMENT:  Chronic diastolic heart failure, compensated at this moment  Chronic atrial fibrillation on anticoagulation  Chronic venous insufficiency  Hypertension  Hyperlipidemia  Moderate mitral valve regurgitation  Severe aortic stenosis, manage medically-no plan for surgical intervention or TAVR  Left hip fracture due to mechanical fall status post surgery  Acute renal insufficiency with chronic CKD      PLAN:  In summary, this is a 89 year-old female with a past medical history of severe stenosis, chronic diastolic heart failure, severe pulmonary hypertension, who is known to our practice.  She has been followed in outpatient for severe stenosis, there is no plan for TAVR.    Currently she appears compensated from a heart failure standpoint.  Hold diuretic today.  Creatinine increased, possibly due to low blood pressure postop.  Continue anticoagulation.  Check INR in the morning.  Out of bed to chair.  Monitor oxygen off of O2.  Continue midodrine for blood pressure.  Continue with aspirin.         __________________________________________________________________________  Thank you for allowing me to participate in the care of your patient. Please contact me should any questions arise.    ALFONZO Ayala DO, [facility]  [phone]

## 2021-10-29 LAB
ADD ON TEST-SPECIMEN IN LAB: SIGNIFICANT CHANGE UP
ANION GAP SERPL CALC-SCNC: 7 MMOL/L — SIGNIFICANT CHANGE UP (ref 5–17)
BUN SERPL-MCNC: 38 MG/DL — HIGH (ref 7–23)
CALCIUM SERPL-MCNC: 8.2 MG/DL — LOW (ref 8.5–10.1)
CHLORIDE SERPL-SCNC: 102 MMOL/L — SIGNIFICANT CHANGE UP (ref 96–108)
CO2 SERPL-SCNC: 24 MMOL/L — SIGNIFICANT CHANGE UP (ref 22–31)
CREAT SERPL-MCNC: 0.88 MG/DL — SIGNIFICANT CHANGE UP (ref 0.5–1.3)
GLUCOSE SERPL-MCNC: 116 MG/DL — HIGH (ref 70–99)
HCT VFR BLD CALC: 31 % — LOW (ref 34.5–45)
HGB BLD-MCNC: 9.8 G/DL — LOW (ref 11.5–15.5)
INR BLD: 3.03 RATIO — HIGH (ref 0.88–1.16)
MCHC RBC-ENTMCNC: 28.4 PG — SIGNIFICANT CHANGE UP (ref 27–34)
MCHC RBC-ENTMCNC: 31.6 GM/DL — LOW (ref 32–36)
MCV RBC AUTO: 89.9 FL — SIGNIFICANT CHANGE UP (ref 80–100)
PLATELET # BLD AUTO: 132 K/UL — LOW (ref 150–400)
POTASSIUM SERPL-MCNC: 4.8 MMOL/L — SIGNIFICANT CHANGE UP (ref 3.5–5.3)
POTASSIUM SERPL-SCNC: 4.8 MMOL/L — SIGNIFICANT CHANGE UP (ref 3.5–5.3)
PROTHROM AB SERPL-ACNC: 33.6 SEC — HIGH (ref 10.6–13.6)
RBC # BLD: 3.45 M/UL — LOW (ref 3.8–5.2)
RBC # FLD: 14.2 % — SIGNIFICANT CHANGE UP (ref 10.3–14.5)
SODIUM SERPL-SCNC: 133 MMOL/L — LOW (ref 135–145)
TROPONIN I, HIGH SENSITIVITY RESULT: 228.51 NG/L — HIGH
TROPONIN I, HIGH SENSITIVITY RESULT: 239.02 NG/L — HIGH
WBC # BLD: 11.35 K/UL — HIGH (ref 3.8–10.5)
WBC # FLD AUTO: 11.35 K/UL — HIGH (ref 3.8–10.5)

## 2021-10-29 PROCEDURE — 99232 SBSQ HOSP IP/OBS MODERATE 35: CPT

## 2021-10-29 PROCEDURE — 74176 CT ABD & PELVIS W/O CONTRAST: CPT | Mod: 26

## 2021-10-29 PROCEDURE — 93010 ELECTROCARDIOGRAM REPORT: CPT

## 2021-10-29 PROCEDURE — 71045 X-RAY EXAM CHEST 1 VIEW: CPT | Mod: 26

## 2021-10-29 PROCEDURE — 99231 SBSQ HOSP IP/OBS SF/LOW 25: CPT

## 2021-10-29 RX ORDER — CEFTRIAXONE 500 MG/1
1000 INJECTION, POWDER, FOR SOLUTION INTRAMUSCULAR; INTRAVENOUS EVERY 24 HOURS
Refills: 0 | Status: DISCONTINUED | OUTPATIENT
Start: 2021-10-30 | End: 2021-11-01

## 2021-10-29 RX ORDER — PANTOPRAZOLE SODIUM 20 MG/1
40 TABLET, DELAYED RELEASE ORAL DAILY
Refills: 0 | Status: DISCONTINUED | OUTPATIENT
Start: 2021-10-29 | End: 2021-11-01

## 2021-10-29 RX ORDER — ONDANSETRON 8 MG/1
4 TABLET, FILM COATED ORAL EVERY 4 HOURS
Refills: 0 | Status: DISCONTINUED | OUTPATIENT
Start: 2021-10-29 | End: 2021-11-01

## 2021-10-29 RX ORDER — CEFTRIAXONE 500 MG/1
1000 INJECTION, POWDER, FOR SOLUTION INTRAMUSCULAR; INTRAVENOUS ONCE
Refills: 0 | Status: COMPLETED | OUTPATIENT
Start: 2021-10-29 | End: 2021-10-29

## 2021-10-29 RX ORDER — FUROSEMIDE 40 MG
20 TABLET ORAL DAILY
Refills: 0 | Status: DISCONTINUED | OUTPATIENT
Start: 2021-10-29 | End: 2021-10-31

## 2021-10-29 RX ORDER — CEFTRIAXONE 500 MG/1
INJECTION, POWDER, FOR SOLUTION INTRAMUSCULAR; INTRAVENOUS
Refills: 0 | Status: DISCONTINUED | OUTPATIENT
Start: 2021-10-29 | End: 2021-11-01

## 2021-10-29 RX ADMIN — PANTOPRAZOLE SODIUM 40 MILLIGRAM(S): 20 TABLET, DELAYED RELEASE ORAL at 15:20

## 2021-10-29 RX ADMIN — ATORVASTATIN CALCIUM 10 MILLIGRAM(S): 80 TABLET, FILM COATED ORAL at 21:09

## 2021-10-29 RX ADMIN — TRAMADOL HYDROCHLORIDE 50 MILLIGRAM(S): 50 TABLET ORAL at 21:08

## 2021-10-29 RX ADMIN — SENNA PLUS 2 TABLET(S): 8.6 TABLET ORAL at 21:09

## 2021-10-29 RX ADMIN — ONDANSETRON 4 MILLIGRAM(S): 8 TABLET, FILM COATED ORAL at 19:20

## 2021-10-29 RX ADMIN — ONDANSETRON 4 MILLIGRAM(S): 8 TABLET, FILM COATED ORAL at 00:55

## 2021-10-29 RX ADMIN — ONDANSETRON 4 MILLIGRAM(S): 8 TABLET, FILM COATED ORAL at 10:48

## 2021-10-29 RX ADMIN — Medication 650 MILLIGRAM(S): at 23:49

## 2021-10-29 RX ADMIN — Medication 650 MILLIGRAM(S): at 06:44

## 2021-10-29 RX ADMIN — HEPARIN SODIUM 5000 UNIT(S): 5000 INJECTION INTRAVENOUS; SUBCUTANEOUS at 06:43

## 2021-10-29 RX ADMIN — Medication 650 MILLIGRAM(S): at 00:58

## 2021-10-29 RX ADMIN — SODIUM CHLORIDE 75 MILLILITER(S): 9 INJECTION INTRAMUSCULAR; INTRAVENOUS; SUBCUTANEOUS at 06:42

## 2021-10-29 RX ADMIN — MIDODRINE HYDROCHLORIDE 2.5 MILLIGRAM(S): 2.5 TABLET ORAL at 06:43

## 2021-10-29 RX ADMIN — CEFTRIAXONE 1000 MILLIGRAM(S): 500 INJECTION, POWDER, FOR SOLUTION INTRAMUSCULAR; INTRAVENOUS at 11:30

## 2021-10-29 RX ADMIN — Medication 12.5 MILLIGRAM(S): at 06:44

## 2021-10-29 RX ADMIN — Medication 20 MILLIGRAM(S): at 11:55

## 2021-10-29 RX ADMIN — Medication 6 MILLIGRAM(S): at 21:08

## 2021-10-29 RX ADMIN — TRAMADOL HYDROCHLORIDE 50 MILLIGRAM(S): 50 TABLET ORAL at 22:30

## 2021-10-29 RX ADMIN — Medication 100 MICROGRAM(S): at 06:44

## 2021-10-29 NOTE — PROGRESS NOTE ADULT - ASSESSMENT
89 y/o F with PMH A fib on Warfarin, HTN, HLD, hypothyroidism, CHFpEF, pleural effusions, severe pulmonary HTN, mild to moderate mitral regurgitation, severe tricuspid valve regurgitation presents after a fall found to have left femur fracture, S/P Left femur  IM nail on 10/26/21. Consulted by Dr. Pennington   for VTE prophylaxis, risk stratification, and anticoagulation management. Patient is high risk for VTE due to age, t surgery, immobility, and moderate bleeding risk. Patient with [id number] Score: 6  with no h/o VTE or stroke,and moderate risk for bleeding with plt level of 101, not recommending to bridge with treatment dose of Lovenox       Plan:  ::Hold coumadin 1.63 yesterday to 3.03 today. pt's normal dos e is 1.5mg tues and sat and 2mg all the outher days.   ::Daily PT/INR  ::Daily CBC/BMP  ::Enc ambulation  ::Venodynes     Will continue to follow.  Dispo: [facility]

## 2021-10-29 NOTE — PROGRESS NOTE ADULT - ASSESSMENT
Assessment:  89y Female s/p LEFT Hip IMN POD2    -Pain control  -VTE ppx per AC Team  -WBAT/OOB with assistance as needed  -PT  -Ice as needed  -Encourage incentive spirometry  -Placed on telemetry unit due to history of afib and HR in the 110's postoperatively, HR improved.   -Transfused 1U 10/28 due to postoperative drop in H/H. Monitor H/H and transfuse as needed. Medical management per primary team  -DC planning  -Will discuss with attending and will advise if plan changes   Assessment:  89y Female s/p LEFT Hip IMN POD3    -Pain control  -VTE ppx per AC Team  -WBAT/OOB with assistance as needed  -PT  -Ice as needed  -Encourage incentive spirometry  -Placed on telemetry unit due to history of afib and HR in the 110's postoperatively, HR improved.   -Transfused 1U 10/28 due to postoperative drop in H/H. Monitor H/H and transfuse as needed. Medical management per primary team  -DC planning  -Will discuss with attending and will advise if plan changes

## 2021-10-30 LAB
ANION GAP SERPL CALC-SCNC: 5 MMOL/L — SIGNIFICANT CHANGE UP (ref 5–17)
BUN SERPL-MCNC: 31 MG/DL — HIGH (ref 7–23)
CALCIUM SERPL-MCNC: 8.7 MG/DL — SIGNIFICANT CHANGE UP (ref 8.5–10.1)
CHLORIDE SERPL-SCNC: 107 MMOL/L — SIGNIFICANT CHANGE UP (ref 96–108)
CO2 SERPL-SCNC: 26 MMOL/L — SIGNIFICANT CHANGE UP (ref 22–31)
CREAT SERPL-MCNC: 0.84 MG/DL — SIGNIFICANT CHANGE UP (ref 0.5–1.3)
GLUCOSE SERPL-MCNC: 89 MG/DL — SIGNIFICANT CHANGE UP (ref 70–99)
HCT VFR BLD CALC: 29.5 % — LOW (ref 34.5–45)
HGB BLD-MCNC: 9.4 G/DL — LOW (ref 11.5–15.5)
INR BLD: 3.32 RATIO — HIGH (ref 0.88–1.16)
MCHC RBC-ENTMCNC: 29.1 PG — SIGNIFICANT CHANGE UP (ref 27–34)
MCHC RBC-ENTMCNC: 31.9 GM/DL — LOW (ref 32–36)
MCV RBC AUTO: 91.3 FL — SIGNIFICANT CHANGE UP (ref 80–100)
PLATELET # BLD AUTO: 145 K/UL — LOW (ref 150–400)
POTASSIUM SERPL-MCNC: 5.5 MMOL/L — HIGH (ref 3.5–5.3)
POTASSIUM SERPL-MCNC: 5.8 MMOL/L — HIGH (ref 3.5–5.3)
POTASSIUM SERPL-SCNC: 5.5 MMOL/L — HIGH (ref 3.5–5.3)
POTASSIUM SERPL-SCNC: 5.8 MMOL/L — HIGH (ref 3.5–5.3)
PROTHROM AB SERPL-ACNC: 36.4 SEC — HIGH (ref 10.6–13.6)
RBC # BLD: 3.23 M/UL — LOW (ref 3.8–5.2)
RBC # FLD: 14.6 % — HIGH (ref 10.3–14.5)
SODIUM SERPL-SCNC: 138 MMOL/L — SIGNIFICANT CHANGE UP (ref 135–145)
WBC # BLD: 7.24 K/UL — SIGNIFICANT CHANGE UP (ref 3.8–10.5)
WBC # FLD AUTO: 7.24 K/UL — SIGNIFICANT CHANGE UP (ref 3.8–10.5)

## 2021-10-30 PROCEDURE — 99232 SBSQ HOSP IP/OBS MODERATE 35: CPT

## 2021-10-30 RX ORDER — TRAMADOL HYDROCHLORIDE 50 MG/1
25 TABLET ORAL EVERY 6 HOURS
Refills: 0 | Status: DISCONTINUED | OUTPATIENT
Start: 2021-10-30 | End: 2021-11-01

## 2021-10-30 RX ORDER — MIRTAZAPINE 45 MG/1
7.5 TABLET, ORALLY DISINTEGRATING ORAL AT BEDTIME
Refills: 0 | Status: DISCONTINUED | OUTPATIENT
Start: 2021-10-30 | End: 2021-11-01

## 2021-10-30 RX ORDER — TRAMADOL HYDROCHLORIDE 50 MG/1
50 TABLET ORAL EVERY 6 HOURS
Refills: 0 | Status: DISCONTINUED | OUTPATIENT
Start: 2021-10-30 | End: 2021-11-01

## 2021-10-30 RX ORDER — SODIUM POLYSTYRENE SULFONATE 4.1 MEQ/G
15 POWDER, FOR SUSPENSION ORAL ONCE
Refills: 0 | Status: COMPLETED | OUTPATIENT
Start: 2021-10-30 | End: 2021-10-30

## 2021-10-30 RX ORDER — MIDODRINE HYDROCHLORIDE 2.5 MG/1
5 TABLET ORAL THREE TIMES A DAY
Refills: 0 | Status: DISCONTINUED | OUTPATIENT
Start: 2021-10-30 | End: 2021-11-01

## 2021-10-30 RX ADMIN — Medication 12.5 MILLIGRAM(S): at 05:39

## 2021-10-30 RX ADMIN — ATORVASTATIN CALCIUM 10 MILLIGRAM(S): 80 TABLET, FILM COATED ORAL at 21:01

## 2021-10-30 RX ADMIN — SODIUM POLYSTYRENE SULFONATE 15 GRAM(S): 4.1 POWDER, FOR SUSPENSION ORAL at 13:47

## 2021-10-30 RX ADMIN — CEFTRIAXONE 1000 MILLIGRAM(S): 500 INJECTION, POWDER, FOR SOLUTION INTRAMUSCULAR; INTRAVENOUS at 10:49

## 2021-10-30 RX ADMIN — MIRTAZAPINE 7.5 MILLIGRAM(S): 45 TABLET, ORALLY DISINTEGRATING ORAL at 21:01

## 2021-10-30 RX ADMIN — MIDODRINE HYDROCHLORIDE 5 MILLIGRAM(S): 2.5 TABLET ORAL at 16:42

## 2021-10-30 RX ADMIN — Medication 650 MILLIGRAM(S): at 05:37

## 2021-10-30 RX ADMIN — POLYETHYLENE GLYCOL 3350 17 GRAM(S): 17 POWDER, FOR SOLUTION ORAL at 10:48

## 2021-10-30 RX ADMIN — Medication 650 MILLIGRAM(S): at 16:42

## 2021-10-30 RX ADMIN — Medication 100 MICROGRAM(S): at 05:37

## 2021-10-30 RX ADMIN — PANTOPRAZOLE SODIUM 40 MILLIGRAM(S): 20 TABLET, DELAYED RELEASE ORAL at 10:48

## 2021-10-30 RX ADMIN — MIDODRINE HYDROCHLORIDE 5 MILLIGRAM(S): 2.5 TABLET ORAL at 10:48

## 2021-10-30 RX ADMIN — Medication 650 MILLIGRAM(S): at 06:37

## 2021-10-30 RX ADMIN — Medication 12.5 MILLIGRAM(S): at 16:41

## 2021-10-30 RX ADMIN — Medication 81 MILLIGRAM(S): at 10:48

## 2021-10-30 RX ADMIN — Medication 20 MILLIGRAM(S): at 10:48

## 2021-10-30 RX ADMIN — Medication 650 MILLIGRAM(S): at 10:48

## 2021-10-30 RX ADMIN — MIDODRINE HYDROCHLORIDE 2.5 MILLIGRAM(S): 2.5 TABLET ORAL at 05:38

## 2021-10-30 RX ADMIN — Medication 650 MILLIGRAM(S): at 00:52

## 2021-10-30 NOTE — PROVIDER CONTACT NOTE (OTHER) - SITUATION
Consult placed for Dr. Ayala. Spoke with Abbi
Patient retaining urine. Bladder scan showing 1212 cc.
Patient had positive troponin 10/29 09:00 319.33. Second and third troponin drawn 19:00, 21:00. Troponin positive x3, 19:00 trop 239.02, 21:00 trop 228.51.
Presents with AF 130S

## 2021-10-30 NOTE — CHART NOTE - NSCHARTNOTEFT_GEN_A_CORE
Received call from RN re:  +troponin      Patient in NAD with no complaints. chart reviewed by author; patient admitted for Left hip fracture s/p IM nailing and transferred to tele fo rapid Afib, Troponin on borderline elevated prior (319 at 9am) likely due to demand ischemia 2/2 for rapid Afib from earlier. repeat troponin at 20:23 was 239 and at 22:30 was 228. patient asymptomatic and troponin actively downtrending. no need for further intervention at this time, will continue to monitor. case discussed with RN.

## 2021-10-30 NOTE — PROVIDER CONTACT NOTE (OTHER) - ACTION/TREATMENT ORDERED:
Will straight cath as per Dr. Otreo orders. Will continue to monitor.
250 cc bolus given with pos+ outcome
No new orders at this time as per Dr. Diallo. Will continue to monitor.

## 2021-10-30 NOTE — PROVIDER CONTACT NOTE (OTHER) - BACKGROUND
Patient admitted s/p fall. Hip fx repair 10/29.
Patient admitted s/p fall, s/p hip fx repair.
Admit : s/p L femur fx  POD # 1 L hip IM

## 2021-10-30 NOTE — PROVIDER CONTACT NOTE (OTHER) - ASSESSMENT
Patient abdomen distended, pain/discomfort when touching.
Stable, asymptomatic, denies cp, palpitations, pain management in progress
No signs of distress.

## 2021-10-30 NOTE — PROGRESS NOTE ADULT - ASSESSMENT
Assessment:  89y Female s/p LEFT Hip IMN POD4    -Pain control  -VTE ppx per AC Team  -WBAT/OOB with assistance as needed  -PT  -Ice as needed  -Encourage incentive spirometry  - Placed on telemetry unit due to history of afib and HR in the 110's postoperatively, HR improved on beta blocker. Troponins obtained by medical team and thought to be due to demand ischemia secondary to afib, downtrending.   - +UTI on rocephin  -Transfused 1U 10/28 due to postoperative drop in H/H. Monitor H/H and transfuse as needed. Medical management per primary team  -DC planning potentially 11/1 for [facility]  -Will discuss with attending and will advise if plan changes

## 2021-10-31 LAB
ANION GAP SERPL CALC-SCNC: 8 MMOL/L — SIGNIFICANT CHANGE UP (ref 5–17)
BUN SERPL-MCNC: 28 MG/DL — HIGH (ref 7–23)
CALCIUM SERPL-MCNC: 8.4 MG/DL — LOW (ref 8.5–10.1)
CHLORIDE SERPL-SCNC: 109 MMOL/L — HIGH (ref 96–108)
CO2 SERPL-SCNC: 23 MMOL/L — SIGNIFICANT CHANGE UP (ref 22–31)
CREAT SERPL-MCNC: 0.73 MG/DL — SIGNIFICANT CHANGE UP (ref 0.5–1.3)
GLUCOSE SERPL-MCNC: 82 MG/DL — SIGNIFICANT CHANGE UP (ref 70–99)
HCT VFR BLD CALC: 31.4 % — LOW (ref 34.5–45)
HGB BLD-MCNC: 9.8 G/DL — LOW (ref 11.5–15.5)
INR BLD: 3.25 RATIO — HIGH (ref 0.88–1.16)
MCHC RBC-ENTMCNC: 28.9 PG — SIGNIFICANT CHANGE UP (ref 27–34)
MCHC RBC-ENTMCNC: 31.2 GM/DL — LOW (ref 32–36)
MCV RBC AUTO: 92.6 FL — SIGNIFICANT CHANGE UP (ref 80–100)
PLATELET # BLD AUTO: 163 K/UL — SIGNIFICANT CHANGE UP (ref 150–400)
POTASSIUM SERPL-MCNC: 4 MMOL/L — SIGNIFICANT CHANGE UP (ref 3.5–5.3)
POTASSIUM SERPL-SCNC: 4 MMOL/L — SIGNIFICANT CHANGE UP (ref 3.5–5.3)
PROTHROM AB SERPL-ACNC: 35.9 SEC — HIGH (ref 10.6–13.6)
RBC # BLD: 3.39 M/UL — LOW (ref 3.8–5.2)
RBC # FLD: 14.9 % — HIGH (ref 10.3–14.5)
SODIUM SERPL-SCNC: 140 MMOL/L — SIGNIFICANT CHANGE UP (ref 135–145)
WBC # BLD: 7.77 K/UL — SIGNIFICANT CHANGE UP (ref 3.8–10.5)
WBC # FLD AUTO: 7.77 K/UL — SIGNIFICANT CHANGE UP (ref 3.8–10.5)

## 2021-10-31 PROCEDURE — 99232 SBSQ HOSP IP/OBS MODERATE 35: CPT

## 2021-10-31 PROCEDURE — 99231 SBSQ HOSP IP/OBS SF/LOW 25: CPT

## 2021-10-31 RX ORDER — FUROSEMIDE 40 MG
20 TABLET ORAL DAILY
Refills: 0 | Status: DISCONTINUED | OUTPATIENT
Start: 2021-10-31 | End: 2021-11-01

## 2021-10-31 RX ADMIN — Medication 81 MILLIGRAM(S): at 09:03

## 2021-10-31 RX ADMIN — MIDODRINE HYDROCHLORIDE 5 MILLIGRAM(S): 2.5 TABLET ORAL at 06:15

## 2021-10-31 RX ADMIN — Medication 650 MILLIGRAM(S): at 18:51

## 2021-10-31 RX ADMIN — Medication 20 MILLIGRAM(S): at 09:03

## 2021-10-31 RX ADMIN — Medication 650 MILLIGRAM(S): at 06:12

## 2021-10-31 RX ADMIN — PANTOPRAZOLE SODIUM 40 MILLIGRAM(S): 20 TABLET, DELAYED RELEASE ORAL at 09:04

## 2021-10-31 RX ADMIN — Medication 650 MILLIGRAM(S): at 01:10

## 2021-10-31 RX ADMIN — TRAMADOL HYDROCHLORIDE 50 MILLIGRAM(S): 50 TABLET ORAL at 09:22

## 2021-10-31 RX ADMIN — TRAMADOL HYDROCHLORIDE 50 MILLIGRAM(S): 50 TABLET ORAL at 22:42

## 2021-10-31 RX ADMIN — MIRTAZAPINE 7.5 MILLIGRAM(S): 45 TABLET, ORALLY DISINTEGRATING ORAL at 22:56

## 2021-10-31 RX ADMIN — Medication 650 MILLIGRAM(S): at 00:16

## 2021-10-31 RX ADMIN — Medication 12.5 MILLIGRAM(S): at 06:15

## 2021-10-31 RX ADMIN — MIDODRINE HYDROCHLORIDE 5 MILLIGRAM(S): 2.5 TABLET ORAL at 12:36

## 2021-10-31 RX ADMIN — Medication 650 MILLIGRAM(S): at 22:57

## 2021-10-31 RX ADMIN — ATORVASTATIN CALCIUM 10 MILLIGRAM(S): 80 TABLET, FILM COATED ORAL at 22:56

## 2021-10-31 RX ADMIN — Medication 100 MICROGRAM(S): at 06:16

## 2021-10-31 RX ADMIN — SENNA PLUS 2 TABLET(S): 8.6 TABLET ORAL at 22:57

## 2021-10-31 RX ADMIN — CEFTRIAXONE 1000 MILLIGRAM(S): 500 INJECTION, POWDER, FOR SOLUTION INTRAMUSCULAR; INTRAVENOUS at 09:04

## 2021-10-31 RX ADMIN — MIDODRINE HYDROCHLORIDE 5 MILLIGRAM(S): 2.5 TABLET ORAL at 18:55

## 2021-10-31 NOTE — PROGRESS NOTE ADULT - ASSESSMENT
89 y/o F with PMH A fib on Warfarin, HTN, HLD, hypothyroidism, CHFpEF, pleural effusions, severe pulmonary HTN, mild to moderate mitral regurgitation, severe tricuspid valve regurgitation presents after a fall found to have left femur fracture, S/P Left femur  IM nail on 10/26/21. Consulted by Dr. Pennington   for VTE prophylaxis, risk stratification, and anticoagulation management. Patient is high risk for VTE due to age, t surgery, immobility, and moderate bleeding risk. Patient with [id number] Score: 6  with no h/o VTE or stroke,and moderate risk for bleeding with plt level of 101, not recommending to bridge with treatment dose of Lovenox       Plan:  ::Hold coumadin until INR is 3.0 or less. then resume at 2mg  :Daily PT/INR  ::Daily CBC/BMP  ::Enc ambulation  ::Venodynes     Will continue to follow.  Dispo: [facility]

## 2021-10-31 NOTE — PROGRESS NOTE ADULT - ASSESSMENT
Assessment:  89y Female s/p LEFT Hip IMN POD5    -Pain control  -VTE ppx per AC Team  -WBAT/OOB with assistance as needed  -PT  -Ice as needed  -Encourage incentive spirometry  - Placed on telemetry unit due to history of afib and HR in the 110's postoperatively, HR improved on beta blocker. Troponins obtained by medical team and thought to be due to demand ischemia secondary to afib, downtrending.   - +UTI on rocephin  -Transfused 1U 10/28 due to postoperative drop in H/H. Monitor H/H and transfuse as needed. Medical management per primary team  -DC planning potentially 11/1 for [facility]  -Will discuss with attending and will advise if plan changes

## 2021-11-01 ENCOUNTER — TRANSCRIPTION ENCOUNTER (OUTPATIENT)
Age: 86
End: 2021-11-01

## 2021-11-01 VITALS
DIASTOLIC BLOOD PRESSURE: 48 MMHG | HEART RATE: 77 BPM | SYSTOLIC BLOOD PRESSURE: 105 MMHG | OXYGEN SATURATION: 100 % | RESPIRATION RATE: 18 BRPM | TEMPERATURE: 97 F

## 2021-11-01 LAB
ANION GAP SERPL CALC-SCNC: 7 MMOL/L — SIGNIFICANT CHANGE UP (ref 5–17)
BUN SERPL-MCNC: 31 MG/DL — HIGH (ref 7–23)
CALCIUM SERPL-MCNC: 8.5 MG/DL — SIGNIFICANT CHANGE UP (ref 8.5–10.1)
CHLORIDE SERPL-SCNC: 105 MMOL/L — SIGNIFICANT CHANGE UP (ref 96–108)
CO2 SERPL-SCNC: 24 MMOL/L — SIGNIFICANT CHANGE UP (ref 22–31)
CREAT SERPL-MCNC: 0.83 MG/DL — SIGNIFICANT CHANGE UP (ref 0.5–1.3)
GLUCOSE SERPL-MCNC: 104 MG/DL — HIGH (ref 70–99)
HCT VFR BLD CALC: 31.9 % — LOW (ref 34.5–45)
HGB BLD-MCNC: 9.9 G/DL — LOW (ref 11.5–15.5)
INR BLD: 2.57 RATIO — HIGH (ref 0.88–1.16)
MCHC RBC-ENTMCNC: 29.5 PG — SIGNIFICANT CHANGE UP (ref 27–34)
MCHC RBC-ENTMCNC: 31 GM/DL — LOW (ref 32–36)
MCV RBC AUTO: 94.9 FL — SIGNIFICANT CHANGE UP (ref 80–100)
PLATELET # BLD AUTO: 206 K/UL — SIGNIFICANT CHANGE UP (ref 150–400)
POTASSIUM SERPL-MCNC: 3.7 MMOL/L — SIGNIFICANT CHANGE UP (ref 3.5–5.3)
POTASSIUM SERPL-SCNC: 3.7 MMOL/L — SIGNIFICANT CHANGE UP (ref 3.5–5.3)
PROTHROM AB SERPL-ACNC: 28.5 SEC — HIGH (ref 10.6–13.6)
RBC # BLD: 3.36 M/UL — LOW (ref 3.8–5.2)
RBC # FLD: 15.8 % — HIGH (ref 10.3–14.5)
SARS-COV-2 RNA SPEC QL NAA+PROBE: SIGNIFICANT CHANGE UP
SODIUM SERPL-SCNC: 136 MMOL/L — SIGNIFICANT CHANGE UP (ref 135–145)
WBC # BLD: 8.12 K/UL — SIGNIFICANT CHANGE UP (ref 3.8–10.5)
WBC # FLD AUTO: 8.12 K/UL — SIGNIFICANT CHANGE UP (ref 3.8–10.5)

## 2021-11-01 PROCEDURE — 99239 HOSP IP/OBS DSCHRG MGMT >30: CPT

## 2021-11-01 PROCEDURE — 99231 SBSQ HOSP IP/OBS SF/LOW 25: CPT

## 2021-11-01 RX ORDER — WARFARIN SODIUM 2.5 MG/1
1.5 TABLET ORAL DAILY
Refills: 0 | Status: DISCONTINUED | OUTPATIENT
Start: 2021-11-01 | End: 2021-11-01

## 2021-11-01 RX ORDER — POLYETHYLENE GLYCOL 3350 17 G/17G
17 POWDER, FOR SOLUTION ORAL
Qty: 0 | Refills: 0 | DISCHARGE
Start: 2021-11-01

## 2021-11-01 RX ORDER — MIDODRINE HYDROCHLORIDE 2.5 MG/1
1 TABLET ORAL
Qty: 0 | Refills: 0 | DISCHARGE
Start: 2021-11-01

## 2021-11-01 RX ORDER — WARFARIN SODIUM 2.5 MG/1
1 TABLET ORAL
Qty: 0 | Refills: 0 | DISCHARGE

## 2021-11-01 RX ORDER — MIRTAZAPINE 45 MG/1
1 TABLET, ORALLY DISINTEGRATING ORAL
Qty: 0 | Refills: 0 | DISCHARGE
Start: 2021-11-01

## 2021-11-01 RX ORDER — WARFARIN SODIUM 2.5 MG/1
1.5 TABLET ORAL
Qty: 0 | Refills: 0 | DISCHARGE

## 2021-11-01 RX ORDER — FUROSEMIDE 40 MG
1 TABLET ORAL
Qty: 0 | Refills: 0 | DISCHARGE
Start: 2021-11-01

## 2021-11-01 RX ORDER — METOPROLOL TARTRATE 50 MG
12.5 TABLET ORAL
Qty: 0 | Refills: 0 | DISCHARGE
Start: 2021-11-01

## 2021-11-01 RX ORDER — TRAMADOL HYDROCHLORIDE 50 MG/1
1 TABLET ORAL
Qty: 0 | Refills: 0 | DISCHARGE
Start: 2021-11-01

## 2021-11-01 RX ORDER — ASPIRIN/CALCIUM CARB/MAGNESIUM 324 MG
1 TABLET ORAL
Qty: 0 | Refills: 0 | DISCHARGE
Start: 2021-11-01

## 2021-11-01 RX ORDER — SENNA PLUS 8.6 MG/1
2 TABLET ORAL
Qty: 0 | Refills: 0 | DISCHARGE
Start: 2021-11-01

## 2021-11-01 RX ADMIN — MIDODRINE HYDROCHLORIDE 5 MILLIGRAM(S): 2.5 TABLET ORAL at 11:45

## 2021-11-01 RX ADMIN — TRAMADOL HYDROCHLORIDE 25 MILLIGRAM(S): 50 TABLET ORAL at 10:50

## 2021-11-01 RX ADMIN — TRAMADOL HYDROCHLORIDE 25 MILLIGRAM(S): 50 TABLET ORAL at 10:20

## 2021-11-01 RX ADMIN — Medication 650 MILLIGRAM(S): at 06:21

## 2021-11-01 RX ADMIN — PANTOPRAZOLE SODIUM 40 MILLIGRAM(S): 20 TABLET, DELAYED RELEASE ORAL at 11:45

## 2021-11-01 RX ADMIN — Medication 81 MILLIGRAM(S): at 10:19

## 2021-11-01 RX ADMIN — MIDODRINE HYDROCHLORIDE 5 MILLIGRAM(S): 2.5 TABLET ORAL at 06:20

## 2021-11-01 RX ADMIN — Medication 650 MILLIGRAM(S): at 12:03

## 2021-11-01 RX ADMIN — Medication 650 MILLIGRAM(S): at 11:46

## 2021-11-01 RX ADMIN — Medication 650 MILLIGRAM(S): at 06:20

## 2021-11-01 RX ADMIN — TRAMADOL HYDROCHLORIDE 50 MILLIGRAM(S): 50 TABLET ORAL at 02:38

## 2021-11-01 RX ADMIN — Medication 12.5 MILLIGRAM(S): at 06:20

## 2021-11-01 RX ADMIN — Medication 100 MICROGRAM(S): at 06:20

## 2021-11-01 RX ADMIN — POLYETHYLENE GLYCOL 3350 17 GRAM(S): 17 POWDER, FOR SOLUTION ORAL at 10:19

## 2021-11-01 NOTE — PROGRESS NOTE ADULT - ASSESSMENT
Assessment:  89y Female s/p LEFT Hip IMN POD6    -Pain control  -VTE ppx per AC Team  -WBAT/OOB with assistance as needed  -PT  -Ice as needed  - Encourage incentive spirometry  - Placed on telemetry unit due to history of afib and HR in the 110's postoperatively, HR improved on beta blocker. Troponins obtained by medical team and thought to be due to demand ischemia secondary to afib, downtrending.   - +UTI on rocephin  -Transfused 1U 10/28 due to postoperative drop in H/H. Stabilized.  Medical management per primary team  -DC planning potentially 11/1 for [facility]  -Will discuss with attending and will advise if plan changes

## 2021-11-01 NOTE — PROGRESS NOTE ADULT - REASON FOR ADMISSION
Fall, left hip pain

## 2021-11-01 NOTE — PROGRESS NOTE ADULT - PROVIDER SPECIALTY LIST ADULT
Anticoag Management
Cardiology
Cardiology
Hospitalist
Hospitalist
Orthopedics
Cardiology
Hospitalist
Orthopedics
Anticoag Management
Hospitalist
Orthopedics
Anticoag Management

## 2021-11-01 NOTE — PROGRESS NOTE ADULT - ASSESSMENT
89 y/o F with PMH A fib on Warfarin, HTN, HLD, hypothyroidism, CHFpEF, pleural effusions, severe pulmonary HTN, mild to moderate mitral regurgitation, severe tricuspid valve regurgitation presents after a fall found to have left femur fracture, S/P Left femur  IM nail on 10/26/21. Consulted by Dr. Pennington   for VTE prophylaxis, risk stratification, and anticoagulation management. Patient is high risk for VTE due to age, t surgery, immobility, and moderate bleeding risk. Patient with [id number] Score: 6  with no h/o VTE or stroke,and moderate risk for bleeding with plt level of 101, not recommending to bridge with treatment dose of Lovenox       Plan:  ::INR today 2.5 after three day hold, will resume warfarin this evening  ::Warfarin 1.5mg PO daily, hold for INR 3.0 or >  ::Daily PT/INR  ::Daily CBC/BMP  ::Enc ambulation  ::Venodynes     Will continue to follow.  Dispo: [facility]

## 2021-11-01 NOTE — PROGRESS NOTE ADULT - SUBJECTIVE AND OBJECTIVE BOX
HPI:  89 y/o F with PMH A fib on Warfarin, HTN, HLD, hypothyroidism, CHFpEF, pleural effusions, severe pulmonary HTN, mild to moderate mitral regurgitation, severe tricuspid valve regurgitation presents for a fall. The pt reports that her left leg gave out and she fell down onto the kitchen floor. She believes this occurred around 2 pm and her son did not come home and find her until 4 pm. She did not lose consciousness. Does report hitting her head on the floor. She is complaining of 10/10 pain located at the left hip described as an ache. She does use a scooter to at home adn does walk around on her own but will try to hold onto something. Does not walk very long distances. Denies chest pain or shortness of breath at rest or on exertion, fevers, chills, abdominal pain, N/V, diarrhea/constipation.     ER course: VSS. Labs: WBC 14.72, , INR 2.80, BUN 31, Glucose 149. EKG: Atrial fibrillation with HR 89 bpm, QTC prolonged to 498, LVH, T wave inversions in V5 and V6 are unchanged from prior EKG on 10/29/2020 (personally reviewed).     Imaging:   - XR left knee: joint space narrowing, sclerosis suggesting degenerative joint disease (personally reviewed).   - XR left femur: left femur fracture    - XR hip with pelvis: Left femur fracture .   - CXR: hyperinflated lung fields, flattened diaphragms, no consolidation, no effusion, no pneumothorax (personally reviewed).   - CT head: Increased parenchymal volume loss. No acute hemorrhage mass or mass effect.    Pt was given 500 ml bolus of NS, morphine, and zofran. She is being admitted to med/surg for further management.    (26 Oct 2021 00:48)    Patient is a 89y old  Female who presents with a chief complaint of Fall, left hip pain (26 Oct 2021 06:12)    Consulted by Dr. Pennington   for VTE prophylaxis, risk stratification, and anticoagulation management.    PAST MEDICAL & SURGICAL HISTORY:  AF (atrial fibrillation)  On Warfarin    HTN (hypertension)    Hyperlipidemia    Hypothyroid    Congestive heart failure  Preserved EF    Pleural effusion    Severe pulmonary hypertension    Moderate mitral regurgitation    Severe tricuspid regurgitation    S/P appendectomy    Interval history  10/26/21:Patient seen at bedside, discussed the anticoagulation, she is on Coumadin for Afib, patient's INR is  2.15, NPO for OR today, patient takes 2mg of Coumadin 5times a week.Denies any h/o bleeding   10/27/21:Patient seen at bedside, s/p Left hip IMN, patient is transfered to [address] [place] [address] due to RVR with rate of 100-150 , now the rate in 80's , discussed the resumption of Coumadin,  will give coumadin 5mg tonight INR 1.39  10/28/21: Patient seen at bedside, seemingly comfortable, stable. Hgb 8.4 from 8.7. INR 1.6 from 1.3. Only had one dose of 5mg warfarin last evening. She takes 1.5mg Tuesday/thursday and 2mg all other days. Reduced dose to 3mg for now with likely return to regular dosing thereafter.   10- Pt seen at bedside on rt.  C/O nausea, denies abd pain states her left leg hurts when she move it.  Made her aware that we will hold her coumadin today.    10-: Pt seen at bedside on rth oob in chair.  States she feels much better.  Informed her I was holding her coumadin again her INR is 3.2 today.  States she will go to rehab at [facility] tomorrow.    21: Patient seen at bedside, awake and alert and pleasant. She endorses that she did walk with walker "But very little".    CrCl:29.8    Caprini VTE Risk Score:CAPRINI SCORE  AGE RELATED RISK FACTORS                                                       MOBILITY RELATED FACTORS  [ ] Age 41-60 years                                            (1 Point)                  [ ] Bed rest /restricted mobility                             (1 Point)  [ ] Age: 61-74 years                                           (2 Points)                [ ] Plaster cast                                                   (2 Points)  [x ] Age= 75 years                                              (3 Points)                 [ ] Bed bound for more than 72 hours                   (2 Points)    DISEASE RELATED RISK FACTORS                                               GENDER SPECIFIC FACTORS  [ ] Edema in the lower extremities                       (1 Point)           [ ] Pregnancy                                                            (1 Point)  [ ] Varicose veins                                               (1 Point)                  [ ] Post-partum < 6 weeks                                      (1 Point)             [ ] BMI > 25 Kg/m2                                            (1 Point)                  [ ] Hormonal therapy or oral contraception       (1 Point)                 [ ] Sepsis (in the previous month)                        (1 Point)             [ ] History of pregnancy complications                (1Point)  [ ] Pneumonia or serious lung disease                                             [ ] Unexplained or recurrent  (=/>3), premature                                 (In the previous month)                               (1 Point)                birth with toxemia or growth-restricted infant (1 Point)  [ ] Abnormal pulmonary function test            (1 Point)                                   SURGERY RELATED RISK FACTORS  [ ] Acute myocardial infarction                       (1 Point)                  [ ]  Section                                         (1 Point)  [ ] Congestive heart failure (in the previous month) (1 Point)   [ ] Minor surgery   lasting <45 minutes       (1 Point)   [ ] Inflammatory bowel disease                             (1 Point)          [ ] Arthroscopic surgery                                  (2 Points)  [ ] Central venous access                                    (2 Points)            [ ] General surgery lasting >45 minutes      (2 Points)       [ ] Stroke (in the previous month)                  (5 Points)            [ ] Elective major lower extremity arthroplasty (5 Points)                                   [  ] Malignancy (present or past include skin melanoma                                          but exclude  basal skin cell)    (2 points)                                      TRAUMA RELATED RISK FACTORS                HEMATOLOGY RELATED FACTORS                                  [x ] Fracture of the hip, pelvis, or leg                       (5 Points)  [ ] Prior episodes of VTE                                     (3 Points)          [ ] Acute spinal cord injury (in the previous month)  (5 Points)  [ ] Positive family history for VTE                         (3 Points)       [ ] Paralysis (less than 1 month)                          (5 Points)  [ ] Prothrombin 83023 A                                      (3 Points)         [ ] Multiple Trauma (within 1month)                 (5Points)                                                                                                                                                                [ ] Factor V Leiden                                          (3 Points)                                OTHER RISK FACTORS                          [ ] Lupus anticoagulants                                     (3 Points)                       [ ] BMI > 40                          (1 Point)                                                         [ ] Anticardiolipin antibodies                                (3 Points)                   [ ] Smoking                              (1Point)                                                [ ] High homocysteine in the blood                      (3 Points)                [  ] Diabetes requiring insulin (1point)                         [ ] Other congenital or acquired thrombophilia       (3 Points)          [  ] Chemotherapy                   (1 Point)  [ ] Heparin induced thrombocytopenia                  (3 Points)             [  ] Blood Transfusion                (1 point)                                                                                                             Total Score [    8      ]                                                                                                                                                                                                                                                                                                                                                                                                                                    [id number] Score: 6    IMPROVE Bleeding Risk Score:4.5    Falls Risk:   High (x  )  Mod (  )  Low (  )      FAMILY HISTORY:  FH: lung cancer (Father)      Denies any personal or familial history of clotting or bleeding disorders.    Allergies    Bactrim (Unknown)    Intolerances        REVIEW OF SYSTEMS    (  )Fever	     (  )Constipation	(  )SOB				(  )Headache	(  )Dysuria  (  )Chills	     (  )Melena	(  )Dyspnea present on exertion	                    (  )Dizziness                    (  )Polyuria  ( X )Nausea	     (  )Hematochezia	(  )Cough			                    (  )Syncope   	(  )Hematuria  (  )Vomiting    (  )Chest Pain	(  )Wheezing			(  )Weakness  (  )Diarrhea     (  )Palpitations	(  )Anorexia			( x )joint pain    All  other review of systems negative: Yes    Vital Signs Last 24 Hrs  T(C): 36.3 (21 @ 11:39), Max: 36.8 (10-31-21 @ 20:23)  T(F): 97.3 (21 @ 11:39), Max: 98.2 (10-31-21 @ 20:23)  HR: 77 (21 @ 11:39) (67 - 87)  BP: 105/48 (21 @ 11:39) (96/56 - 118/61)  BP(mean): --  RR: 18 (21 @ 11:39) (18 - 21)  SpO2: 100% (21 @ 11:39) (95% - 100%)    PHYSICAL EXAM:    Constitutional: Appears Well    Neurological: A& O x 3    Skin: Warm    Respiratory and Thorax: normal effort; Breath sounds: normal; No rales/wheezing/rhonchi  	  Cardiovascular: S1, S2, irregular, NMBR	    Gastrointestinal: BS + x 4Q, nontender	    Genitourinary:  Bladder nondistended, nontender    Musculoskeletal:   General Right:   no muscle/joint tenderness,   normal tone, no joint swelling,   ROM: limited	    General Left:   + muscle/joint tenderness,   normal tone, no joint swelling,   ROM: limited    Hip:  Left: Dressing CDI    Lower extrems:   Right: no calf tenderness              negative champ's sign               + pedal pulses    Left:   no calf tenderness              negative champ's sign               + pedal pulses                                              9.9    8.12  )-----------( 206      ( 2021 09:52 )             31.9           136  |  105  |  31<H>  ----------------------------<  104<H>  3.7   |  24  |  0.83    Ca    8.5      2021 09:52        PT/INR - ( 2021 07:54 )   PT: 28.5 sec;   INR: 2.57 ratio           PT/INR - ( 2021 07:54 )   PT: 28.5 sec;   INR: 2.57 ratio    PT/INR - ( 31 Oct 2021 08:12 )   PT: 35.9 sec;   INR: 3.25 ratio   hold coumaidn                    30 oct                                            INR: 3.32          hold coumadin  PT/INR - ( 29 Oct 2021 09:19 )   PT: 33.6 sec;   INR: 3.03 ratio   HOLD COUMADIN  PT/INR - ( 28 Oct 2021 10:50 )   PT: 18.5 sec;   INR: 1.63 ratio                                                      27 OCT 2021 INR: 1.39: ratio   PT/INR - ( 26 Oct 2021 13:57 )   PT: 24.1 sec;   INR: 2.15 ratio         PTT - ( 26 Oct 2021 07:55 )  PTT:35.6 sec		    < from: CT Head No Cont (10.25. @ 20:49) >  IMPRESSION: Increased parenchymal volume loss.    No acute hemorrhage mass or mass effect.    < end of copied text >  	    MEDICATIONS  (STANDING):  acetaminophen     Tablet .. 650 milliGRAM(s) Oral every 6 hours  aspirin  chewable 81 milliGRAM(s) Oral daily  atorvastatin 10 milliGRAM(s) Oral at bedtime  furosemide    Tablet 20 milliGRAM(s) Oral daily  levothyroxine 100 MICROGram(s) Oral daily  metoprolol tartrate 12.5 milliGRAM(s) Oral two times a day  midodrine. 5 milliGRAM(s) Oral three times a day  mirtazapine 7.5 milliGRAM(s) Oral at bedtime  pantoprazole  Injectable 40 milliGRAM(s) IV Push daily  polyethylene glycol 3350 17 Gram(s) Oral daily  senna 2 Tablet(s) Oral at bedtime  warfarin 1.5 milliGRAM(s) Oral daily    DVT Prophylaxis:  LMWH                   (  )  Heparin SQ           (  )  Coumadin             ( x )  Xarelto                  (  )  Eliquis                   (  )  Venodynes           ( x )  Ambulation          ( x )  UFH                       (  )  Contraindicated  (  )  EC ASPIRIN       (  )          
  HPI:  89 y/o F with PMH A fib on Warfarin, HTN, HLD, hypothyroidism, CHFpEF, pleural effusions, severe pulmonary HTN, mild to moderate mitral regurgitation, severe tricuspid valve regurgitation presents for a fall. The pt reports that her left leg gave out and she fell down onto the kitchen floor. She believes this occurred around 2 pm and her son did not come home and find her until 4 pm. She did not lose consciousness. Does report hitting her head on the floor. She is complaining of 10/10 pain located at the left hip described as an ache. She does use a scooter to at home adn does walk around on her own but will try to hold onto something. Does not walk very long distances. Denies chest pain or shortness of breath at rest or on exertion, fevers, chills, abdominal pain, N/V, diarrhea/constipation.     ER course: VSS. Labs: WBC 14.72, , INR 2.80, BUN 31, Glucose 149. EKG: Atrial fibrillation with HR 89 bpm, QTC prolonged to 498, LVH, T wave inversions in V5 and V6 are unchanged from prior EKG on 10/29/2020 (personally reviewed).     Imaging:   - XR left knee: joint space narrowing, sclerosis suggesting degenerative joint disease (personally reviewed).   - XR left femur: left femur fracture    - XR hip with pelvis: Left femur fracture .   - CXR: hyperinflated lung fields, flattened diaphragms, no consolidation, no effusion, no pneumothorax (personally reviewed).   - CT head: Increased parenchymal volume loss. No acute hemorrhage mass or mass effect.    Pt was given 500 ml bolus of NS, morphine, and zofran. She is being admitted to med/surg for further management.    (26 Oct 2021 00:48)      Patient is a 89y old  Female who presents with a chief complaint of Fall, left hip pain (26 Oct 2021 06:12)    Consulted by Dr. Pennington   for VTE prophylaxis, risk stratification, and anticoagulation management.    PAST MEDICAL & SURGICAL HISTORY:  AF (atrial fibrillation)  On Warfarin    HTN (hypertension)    Hyperlipidemia    Hypothyroid    Congestive heart failure  Preserved EF    Pleural effusion    Severe pulmonary hypertension    Moderate mitral regurgitation    Severe tricuspid regurgitation    S/P appendectomy    Interval history  10/26/21:Patient seen at bedside, discussed the anticoagulation, she is on Coumadin for Afib, patient's INR is  2.15, NPO for OR today, patient takes 2mg of Coumadin 5times a week.Denies any h/o bleeding   10/27/21:Patient seen at bedside, s/p Left hip IMN, patient is transfered to [address] [place] [address] due to RVR with rate of 100-150 , now the rate in 80's , discussed the resumption of Coumadin,  will give coumadin 5mg tonight INR 1.39  10/28/21: Patient seen at bedside, seemingly comfortable, stable. Hgb 8.4 from 8.7. INR 1.6 from 1.3. Only had one dose of 5mg warfarin last evening. She takes 1.5mg Tuesday/thursday and 2mg all other days. Reduced dose to 3mg for now with likely return to regular dosing thereafter.   10- Pt seen at bedside on .  C/O nausea, denies abd pain states her left leg hurts when she move it.  Made her aware that we will hold her coumadin today.    10-: Pt seen at bedside on rth oob in chair.  States she feels much better.  Informed her I was holding her coumadin again her INR is 3.2 today.  States she will go to rehab at [facility] tomorrow.    CrCl:29.8    Caprini VTE Risk Score:CAPRINI SCORE  AGE RELATED RISK FACTORS                                                       MOBILITY RELATED FACTORS  [ ] Age 41-60 years                                            (1 Point)                  [ ] Bed rest /restricted mobility                             (1 Point)  [ ] Age: 61-74 years                                           (2 Points)                [ ] Plaster cast                                                   (2 Points)  [x ] Age= 75 years                                              (3 Points)                 [ ] Bed bound for more than 72 hours                   (2 Points)    DISEASE RELATED RISK FACTORS                                               GENDER SPECIFIC FACTORS  [ ] Edema in the lower extremities                       (1 Point)           [ ] Pregnancy                                                            (1 Point)  [ ] Varicose veins                                               (1 Point)                  [ ] Post-partum < 6 weeks                                      (1 Point)             [ ] BMI > 25 Kg/m2                                            (1 Point)                  [ ] Hormonal therapy or oral contraception       (1 Point)                 [ ] Sepsis (in the previous month)                        (1 Point)             [ ] History of pregnancy complications                (1Point)  [ ] Pneumonia or serious lung disease                                             [ ] Unexplained or recurrent  (=/>3), premature                                 (In the previous month)                               (1 Point)                birth with toxemia or growth-restricted infant (1 Point)  [ ] Abnormal pulmonary function test            (1 Point)                                   SURGERY RELATED RISK FACTORS  [ ] Acute myocardial infarction                       (1 Point)                  [ ]  Section                                         (1 Point)  [ ] Congestive heart failure (in the previous month) (1 Point)   [ ] Minor surgery   lasting <45 minutes       (1 Point)   [ ] Inflammatory bowel disease                             (1 Point)          [ ] Arthroscopic surgery                                  (2 Points)  [ ] Central venous access                                    (2 Points)            [ ] General surgery lasting >45 minutes      (2 Points)       [ ] Stroke (in the previous month)                  (5 Points)            [ ] Elective major lower extremity arthroplasty (5 Points)                                   [  ] Malignancy (present or past include skin melanoma                                          but exclude  basal skin cell)    (2 points)                                      TRAUMA RELATED RISK FACTORS                HEMATOLOGY RELATED FACTORS                                  [x ] Fracture of the hip, pelvis, or leg                       (5 Points)  [ ] Prior episodes of VTE                                     (3 Points)          [ ] Acute spinal cord injury (in the previous month)  (5 Points)  [ ] Positive family history for VTE                         (3 Points)       [ ] Paralysis (less than 1 month)                          (5 Points)  [ ] Prothrombin 72367 A                                      (3 Points)         [ ] Multiple Trauma (within 1month)                 (5Points)                                                                                                                                                                [ ] Factor V Leiden                                          (3 Points)                                OTHER RISK FACTORS                          [ ] Lupus anticoagulants                                     (3 Points)                       [ ] BMI > 40                          (1 Point)                                                         [ ] Anticardiolipin antibodies                                (3 Points)                   [ ] Smoking                              (1Point)                                                [ ] High homocysteine in the blood                      (3 Points)                [  ] Diabetes requiring insulin (1point)                         [ ] Other congenital or acquired thrombophilia       (3 Points)          [  ] Chemotherapy                   (1 Point)  [ ] Heparin induced thrombocytopenia                  (3 Points)             [  ] Blood Transfusion                (1 point)                                                                                                             Total Score [    8      ]                                                                                                                                                                                                                                                                                                                                                                                                                                       [id number] Score: 6    IMPROVE Bleeding Risk Score:4.5    Falls Risk:   High (x  )  Mod (  )  Low (  )      FAMILY HISTORY:  FH: lung cancer (Father)      Denies any personal or familial history of clotting or bleeding disorders.    Allergies    Bactrim (Unknown)    Intolerances        REVIEW OF SYSTEMS    (  )Fever	     (  )Constipation	(  )SOB				(  )Headache	(  )Dysuria  (  )Chills	     (  )Melena	(  )Dyspnea present on exertion	                    (  )Dizziness                    (  )Polyuria  ( X )Nausea	     (  )Hematochezia	(  )Cough			                    (  )Syncope   	(  )Hematuria  (  )Vomiting    (  )Chest Pain	(  )Wheezing			(  )Weakness  (  )Diarrhea     (  )Palpitations	(  )Anorexia			( x )joint pain    All  other review of systems negative: Yes  Vital Signs Last 24 Hrs  T(C): 36.4 (10-31-21 @ 08:33), Max: 36.8 (10-30-21 @ 17:08)  T(F): 97.6 (10-31-21 @ 08:33), Max: 98.2 (10-30-21 @ 17:08)  HR: 77 (10-31-21 @ 08:33) (63 - 80)  BP: 104/53 (10-31-21 @ 08:33) (96/68 - 117/59)  BP(mean): 74 (10-31-21 @ 06:10) (74 - 74)  RR: 18 (10-31-21 @ 08:33) (18 - 19)  SpO2: 95% (10-31-21 @ 08:33) (95% - 96%)    PHYSICAL EXAM:    Constitutional: Appears Well    Neurological: A& O x 3    Skin: Warm    Respiratory and Thorax: normal effort; Breath sounds: normal; No rales/wheezing/rhonchi  	  Cardiovascular: S1, S2, irregular, NMBR	    Gastrointestinal: BS + x 4Q, nontender	    Genitourinary:  Bladder nondistended, nontender    Musculoskeletal:   General Right:   no muscle/joint tenderness,   normal tone, no joint swelling,   ROM: limited	    General Left:   + muscle/joint tenderness,   normal tone, no joint swelling,   ROM: limited    Hip:  Left: Dressing CDI    Lower extrems:   Right: no calf tenderness              negative champ's sign               + pedal pulses    Left:   no calf tenderness              negative champ's sign               + pedal pulses                                   9.8    7.77  )-----------( 163      ( 31 Oct 2021 08:12 )             31.4       10    140  |  109<H>  |  28<H>  ----------------------------<  82  4.0   |  23  |  0.73    Ca    8.4<L>      31 Oct 2021 08:12                          9.8    11.35 )-----------( 132      ( 29 Oct 2021 09:19 )             31.0       10    133<L>  |  102  |  38<H>  ----------------------------<  116<H>  4.8   |  24  |  0.88    Ca    8.2<L>      29 Oct 2021 09:19                   8.4    10.74 )-----------( 120      ( 28 Oct 2021 08:24 ) 1 UNIT PRBC             26.8       10-28    132<L>  |  101  |  49<H>  ----------------------------<  111<H>  5.2   |  26  |  1.42<H>    Ca    8.7      28 Oct 2021 08:24                                     8.7    8.09  )-----------( 101      ( 27 Oct 2021 08:23 )             27.4       10    138  |  106  |  31<H>  ----------------------------<  134<H>  5.0   |  25  |  1.08    Ca    8.6      27 Oct 2021 08:23    TPro  x   /  Alb  3.3  /  TBili  x   /  DBili  x   /  AST  x   /  ALT  x   /  AlkPhos  x   10-26      PT/INR - ( 27 Oct 2021 10:45 )   PT: 16.0 sec;   INR: 1.39 ratio         PTT - ( 26 Oct 2021 07:55 )  PTT:35.6 sec                        9.2    6.90  )-----------( 121      ( 26 Oct 2021 07:55 )             29.4       10    141  |  108  |  28<H>  ----------------------------<  109<H>  4.5   |  28  |  0.97    Ca    8.8      26 Oct 2021 07:55    TPro  x   /  Alb  3.3  /  TBili  x   /  DBili  x   /  AST  x   /  ALT  x   /  AlkPhos  x   10-26  PT/INR - ( 31 Oct 2021 08:12 )   PT: 35.9 sec;   INR: 3.25 ratio   hold coumaidn                    30 oct                                            INR: 3.32          hold coumadin  PT/INR - ( 29 Oct 2021 09:19 )   PT: 33.6 sec;   INR: 3.03 ratio   HOLD COUMADIN  PT/INR - ( 28 Oct 2021 10:50 )   PT: 18.5 sec;   INR: 1.63 ratio                                                      27 OCT 2021 INR: 1.39: ratio   PT/INR - ( 26 Oct 2021 13:57 )   PT: 24.1 sec;   INR: 2.15 ratio         PTT - ( 26 Oct 2021 07:55 )  PTT:35.6 sec		    < from: CT Head No Cont (10.25. @ 20:49) >  IMPRESSION: Increased parenchymal volume loss.    No acute hemorrhage mass or mass effect.    < end of copied text >  		    MEDICATIONS  (STANDING):  acetaminophen     Tablet .. 650 milliGRAM(s) Oral every 6 hours  aspirin  chewable 81 milliGRAM(s) Oral daily  atorvastatin 10 milliGRAM(s) Oral at bedtime  cefTRIAXone Injectable.      cefTRIAXone Injectable. 1000 milliGRAM(s) IV Push every 24 hours  furosemide    Tablet 20 milliGRAM(s) Oral daily  levothyroxine 100 MICROGram(s) Oral daily  metoprolol tartrate 12.5 milliGRAM(s) Oral two times a day  midodrine. 5 milliGRAM(s) Oral three times a day  mirtazapine 7.5 milliGRAM(s) Oral at bedtime  pantoprazole  Injectable 40 milliGRAM(s) IV Push daily  polyethylene glycol 3350 17 Gram(s) Oral daily  senna 2 Tablet(s) Oral at bedtime        DVT Prophylaxis:  LMWH                   (  )  Heparin SQ           (  )  Coumadin             ( H )  Xarelto                  (  )  Eliquis                   (  )  Venodynes           ( x )  Ambulation          ( x )  UFH                       (  )  Contraindicated  (  )  EC ASPIRIN       (  )          
The patient was seen and examined. Improved nausea today. Blood pressure on the low side. Heart rate stable. No chest comfort or shortness of breath.      Initial Consult HPI    ________________________________  ELIZABETH LACY is a 89y year old Female with a past medical history  chronic diastolic heart failure no prior history of pleural effusion, chronic atrial fibrillation on anticoagulation with warfarin, hypertension, hyperlipidemia, moderate mitral valve regurgitation, severe aortic stenosis, with severe pulmonary hypertension who is known to our practice.  She now presents status post fall, resulting in a left hip fracture.  She states that her leg gave out and she fell in the kitchen floor.  She did not lose consciousness.  She denies any head trauma.  She is status post hip surgery.    Cardiology consulted for management of chronic diastolic heart failure.    Currently she denies any chest pain or shortness of breath.  She denies any dizziness or lightheadedness.  HR has been  stable on telemetry.     ________________________________  Review of systems: A 10 point review of system has been performed, and is negative except for what has been mentioned in the above history of present illness.     PAST MEDICAL & SURGICAL HISTORY:  AF (atrial fibrillation)  On Warfarin    HTN (hypertension)    Hyperlipidemia    Hypothyroid    Congestive heart failure  Preserved EF    Pleural effusion    Severe pulmonary hypertension    Moderate mitral regurgitation    Severe tricuspid regurgitation    S/P appendectomy     ALLERGIES:  Bactrim (Unknown)    Home Medications:  Melatonin 3 mg oral tablet: 2 tab(s) orally once (at bedtime) (26 Oct 2021 00:47)  pravastatin 20 mg oral tablet: 1 tab(s) orally once a day (at bedtime) (26 Oct 2021 00:47)  spironolactone 25 mg oral tablet: 0.5 tab(s) orally once a day (26 Oct 2021 00:47)  warfarin 1 mg oral tablet: 1.5 tab(s) orally once a day (at bedtime) on Tuesday and Saturday  (26 Oct 2021 00:47)  warfarin 2 mg oral tablet: 1 tab(s) orally once a day (at bedtime) Monday, Wednesday, Thursday, Friday, and Sunday (26 Oct 2021 00:47)     MEDICATIONS  (STANDING):  acetaminophen     Tablet .. 650 milliGRAM(s) Oral every 6 hours  aspirin  chewable 81 milliGRAM(s) Oral daily  atorvastatin 10 milliGRAM(s) Oral at bedtime  cefTRIAXone Injectable.      cefTRIAXone Injectable. 1000 milliGRAM(s) IV Push every 24 hours  furosemide   Injectable 20 milliGRAM(s) IV Push daily  levothyroxine 100 MICROGram(s) Oral daily  metoprolol tartrate 12.5 milliGRAM(s) Oral two times a day  midodrine. 5 milliGRAM(s) Oral three times a day  mirtazapine 7.5 milliGRAM(s) Oral at bedtime  pantoprazole  Injectable 40 milliGRAM(s) IV Push daily  polyethylene glycol 3350 17 Gram(s) Oral daily  senna 2 Tablet(s) Oral at bedtime    MEDICATIONS  (PRN):  benzocaine 15 mG/menthol 3.6 mG (Sugar-Free) Lozenge 1 Lozenge Oral four times a day PRN Sore Throat  ondansetron Injectable 4 milliGRAM(s) IV Push every 4 hours PRN Nausea and/or Vomiting  traMADol 25 milliGRAM(s) Oral every 6 hours PRN Moderate Pain (4 - 6)  traMADol 50 milliGRAM(s) Oral every 6 hours PRN Severe Pain (7 - 10)      Vital Signs Last 24 Hrs  T(C): 36.5 (30 Oct 2021 08:36), Max: 36.6 (30 Oct 2021 05:34)  T(F): 97.7 (30 Oct 2021 08:36), Max: 97.9 (30 Oct 2021 05:34)  HR: 78 (30 Oct 2021 08:36) (66 - 82)  BP: 96/57 (30 Oct 2021 08:36) (96/57 - 127/60)  BP(mean): 68 (30 Oct 2021 05:34) (68 - 71)  RR: 18 (30 Oct 2021 08:36) (18 - 19)  SpO2: 94% (30 Oct 2021 08:36) (94% - 98%)  I&O's Summary      ________________________________  GENERAL APPEARANCE:  No acute distress  HEAD: normocephalic, atraumatic  NECK: supple, no jugular venous distention, no carotid bruit    HEART: Irregularly irregular, S1, S2 normal, 1/6 murmur    CHEST:  No anterior chest wall tenderness    LUNGS: Trace crackles at right base    ABDOMEN: soft, nontender, nondistended, with positive bowel sounds appreciated  EXTREMITIES: no edema.   NEURO: Alert and oriented x3  PSYC:  Normal affect  SKIN:  Dry  ________________________________   TELEMETRY: Rate controlled A. fib    ECG: Atrial fibrillation with nonspecific ST-T wave changes, which appear to be chronic    LABS:                                    9.4    7.24  )-----------( 145      ( 30 Oct 2021 08:14 )             29.5          10-30    x   |  x   |  x   ----------------------------<  x   5.5<H>   |  x   |  x     Ca    8.7      30 Oct 2021 08:14          PT/INR - ( 30 Oct 2021 08:14 )   PT: 36.4 sec;   INR: 3.32 ratio                      ________________________________    RADIOLOGY & ADDITIONAL STUDIES: IMPRESSION:  Small left effusion.    IMPRESSION: Left hip fracture.    The patient subsequently underwent ORIFof the left hip.    Echocardiogram: October 26, 2020-  --There is severe left atrial dilatation (LA volume index 65 ml/m²).  --There is mild left ventricular hypertrophy.  --LV global wall motion is hypokinetic.  --LV ejection fraction (50 %) is borderline normal.  --Left ventricular ejection fraction 50 to 55%. There is asymmetric apical hypertrophy present.  --There is severe right atrial dilatation.  --The aortic root is normal in size (3.40 cm). Ascending aorta is dilated; its diameter is 3.90   cm (2.6 cm/m²).  --The aortic valve is moderately calcified. There is moderate aortic stenosis. The aortic valve   area, by the continuity equation, is 1.03 cm². There is mild aortic regurgitation.  --There is mild to moderate mitral regurgitation.  --There is moderate to severe tricuspid regurgitation.  --There is mild pulmonic regurgitation.  --The right atrial pressure is elevated (11-20 mm Hg). There is severe pulmonary hypertension.  --There is a trace pericardial effusion. Left pleural effusion is present. Right pleural   effusion is present.    LIVER: Within normal limits.  BILE DUCTS: Normal caliber.  GALLBLADDER: Within normal limits.  SPLEEN: Within normal limits. Small accessory spleen.  PANCREAS: Within normal limits.  ADRENALS: Within normal limits.  KIDNEYS/URETERS: Trace bilateral perinephric stranding. Otherwise, within normal limits.    BLADDER: Within normal limits.  REPRODUCTIVE ORGANS: Calcified uterine fibroids. The adnexa are unremarkable by CT criteria.    BOWEL: Diverticulosis of the sigmoid colon. No bowel obstruction.  PERITONEUM: No ascites.  VESSELS: Extensive atherosclerotic calcification of the nonaneurysmal abdominal aorta and iliac arteries.  RETROPERITONEUM/LYMPH NODES: No lymphadenopathy.  ABDOMINAL WALL: Within normal limits.  BONES: Status post intertrochanteric rebekah and interlocking femoral neck pin fixation of the left femur. There is swelling and infiltration of the soft tissues of the left hip with subcutaneous gas, compatible with recent surgery.      ________________________________    ASSESSMENT:  Chronic diastolic heart failure, compensated at this moment  Chronic atrial fibrillation on anticoagulation  Chronic venous insufficiency  Hypertension  Hyperlipidemia  Moderate mitral valve regurgitation  Severe aortic stenosis, manage medically-no plan for surgical intervention or TAVR  Left hip fracture due to mechanical fall status post surgery  Acute renal insufficiency with chronic CKD      PLAN:  In summary, this is a 89 year-old female with a past medical history of severe stenosis, chronic diastolic heart failure, severe pulmonary hypertension, who is known to our practice.  She has been followed in outpatient for severe stenosis, there is no plan for TAVR.  CT reviewed, showing small bilateral pleural effusion.      I will continue with IV diuretics.  Nausea vomiting resolved.  Cardiac enzymes due to demand ischemia. Doubt ACS.  Given persistently borderline blood pressure, will increase midodrine.  Continue anticoagulation for INR 2-3  Continue statin.  Out of bed to chair.           __________________________________________________________________________  Thank you for allowing me to participate in the care of your patient. Please contact me should any questions arise.    ALFONZO Ayala DO, [facility]      
c/c: fall/left hip pain    HPI:  89 y/o F with PMH A fib on Warfarin, HTN, HLD, hypothyroidism, CHFpEF, pleural effusions, severe pulmonary HTN, mild to moderate mitral regurgitation, severe tricuspid valve regurgitation presents for a fall. The pt reports that her left leg gave out and she fell down onto the kitchen floor. She believes this occurred around 2 pm and her son did not come home and find her until 4 pm. She did not lose consciousness. Does report hitting her head on the floor. She is complaining of 10/10 pain located at the left hip described as an ache. She does use a scooter to at home adn does walk around on her own but will try to hold onto something. Does not walk very long distances. Denies chest pain or shortness of breath at rest or on exertion, fevers, chills, abdominal pain, N/V, diarrhea/constipation.     She was admitted with Left hip fracture.   Underwent IM nail 10/26.   Post op transferred to tele for rapid a fib.   required ivf for hypotension.     pt seen and examined this am. c/o pain left hip. no sob/chest pain. no n/v/d/abd pain. Tolerating po. hadn't been out of bed yet.     10/28/21- tele- remains in afib rate is controlled in the 80's. C/o hip pain and waiting for PT to get up  10/29/21- had nausea/vomiting all night, unable to take any pills. Refused to participate with PT today. Denies chest pain, no SOB  10/30/21- up in a chair, no further nausea/vomiting. States has no appetite.     Review of system- All 10 systems reviewed and is as per HPI otherwise negative.     Vital Signs Last 24 Hrs  T(C): 36.5 (30 Oct 2021 08:36), Max: 36.6 (30 Oct 2021 05:34)  T(F): 97.7 (30 Oct 2021 08:36), Max: 97.9 (30 Oct 2021 05:34)  HR: 78 (30 Oct 2021 08:36) (66 - 82)  BP: 96/57 (30 Oct 2021 08:36) (96/57 - 127/60)  BP(mean): 68 (30 Oct 2021 05:34) (68 - 71)  RR: 18 (30 Oct 2021 08:36) (18 - 19)  SpO2: 94% (30 Oct 2021 08:36) (94% - 98%)    PHYSICAL EXAM:  GENERAL: Comfortable, no acute distress  HEAD:  Atraumatic, Normocephalic  EYES: EOMI, PERRLA  HEENT: Moist mucous membranes  NECK: Supple, No JVD  NERVOUS SYSTEM:  Alert & Oriented X3, Motor Strength 5/5 B/L upper and lower extremities  CHEST/LUNG: Clear to auscultation bilaterally  HEART: irregularly irregular.   ABDOMEN: Soft, Nontender, Nondistended; Bowel sounds present  GENITOURINARY- Voiding, no palpable bladder  EXTREMITIES:  No clubbing, cyanosis, or edema  MUSCULOSKELETAL- LEft hip dressing intact, no drainage. +underlying hematoma.  SKIN-no rash    LABS:                        9.4    7.24  )-----------( 145      ( 30 Oct 2021 08:14 )             29.5     30 Oct 2021 10:54    x      |  x      |  x      ----------------------------<  x      5.5     |  x      |  x        Ca    8.7        30 Oct 2021 08:14    PT/INR - ( 30 Oct 2021 08:14 )   PT: 36.4 sec;   INR: 3.32 ratio      MEDICATIONS  (STANDING):  acetaminophen     Tablet .. 650 milliGRAM(s) Oral every 6 hours  aspirin  chewable 81 milliGRAM(s) Oral daily  atorvastatin 10 milliGRAM(s) Oral at bedtime  cefTRIAXone Injectable.      furosemide   Injectable 20 milliGRAM(s) IV Push daily  levothyroxine 100 MICROGram(s) Oral daily  melatonin 6 milliGRAM(s) Oral at bedtime  metoprolol tartrate 12.5 milliGRAM(s) Oral two times a day  midodrine. 2.5 milliGRAM(s) Oral three times a day  pantoprazole  Injectable 40 milliGRAM(s) IV Push daily  polyethylene glycol 3350 17 Gram(s) Oral daily  senna 2 Tablet(s) Oral at bedtime    MEDICATIONS  (PRN):  benzocaine 15 mG/menthol 3.6 mG (Sugar-Free) Lozenge 1 Lozenge Oral four times a day PRN Sore Throat  morphine  - Injectable 4 milliGRAM(s) IV Push every 4 hours PRN Severe Pain (7 - 10)  ondansetron Injectable 4 milliGRAM(s) IV Push every 4 hours PRN Nausea and/or Vomiting  traMADol 50 milliGRAM(s) Oral every 4 hours PRN Moderate Pain (4 - 6)  traMADol 100 milliGRAM(s) Oral every 4 hours PRN Severe Pain (7 - 10)    ASSESSMENT AND PLAN  89 y/o F with PMH A fib on Warfarin, HTN, HLD, hypothyroidism, CHFpEF, pleural effusions, severe pulmonary HTN, mild to moderate mitral regurgitation, severe tricuspid valve regurgitation presents for a fall A/W:    #Left intertrochanteric hip fracture secondary to mechanical fall   - S/P IM nail POD#4  - prn tramadol/tylenol for pain  - physical therapy  - incentive spirometry  - bowel regimen.     #Acute blood loss anemia:  -d/t surgery  -no need for transfusion at this time.     #Nausea/vomiting- resolved  No bowel obstruction on CT done earlier today  Has not been taking narcotics at least >24h  Troponin borderline, likely demand ischemia due to rapid afib earlier  Start Protonix IV  Antiemetics prn  States no appetite- will start on small dose of remeron 7.5mg tonight for appetite stimulant property    #Acute on chronic diastolic CHF  #B/l pleural effusions  #Severe AS  Cont lasix IV per cardio  Cardio following    #Parox AFib   -rate better controlled on BB  -cardio eval  -AC by coumadin    #Hyponatremia  #Mild PRIYANKA  Likely due to dehydration/volume depletion  Resolved    #Hyperkalemia- unclear why, repeated labs still with K 5.5  Will give a dose of kayexalate and repeat    #UTI  Switch to IV Rocephin as unable to tolerate PO    #HLD:  -statin.     #Hypothyroidism  -synthroid.     #DVT px:  coumadin    #Dispo- encourage to mobilize with PT, likely [facility] Monday if clinically better. D/w pt   
c/c: fall/left hip pain    HPI:  89 y/o F with PMH A fib on Warfarin, HTN, HLD, hypothyroidism, CHFpEF, pleural effusions, severe pulmonary HTN, mild to moderate mitral regurgitation, severe tricuspid valve regurgitation presents for a fall. The pt reports that her left leg gave out and she fell down onto the kitchen floor. She believes this occurred around 2 pm and her son did not come home and find her until 4 pm. She did not lose consciousness. Does report hitting her head on the floor. She is complaining of 10/10 pain located at the left hip described as an ache. She does use a scooter to at home adn does walk around on her own but will try to hold onto something. Does not walk very long distances. Denies chest pain or shortness of breath at rest or on exertion, fevers, chills, abdominal pain, N/V, diarrhea/constipation.     She was admitted with Left hip fracture.   Underwent IM nail 10/26.   Post op transferred to [facility] for rapid a fib.   required ivf for hypotension.   Hospital course further complicated by acute chf exacerbation requiring iv lasix. noted to have severe aortic stenosis.     PT seen and examined this am on 2N. Ona ok. Pain controlled. no sob/chest pain . for rhea today.     Review of system- All 10 systems reviewed and is as per HPI otherwise negative.     Vital Signs Last 24 Hrs  T(C): 36.3 (01 Nov 2021 11:39), Max: 36.8 (31 Oct 2021 20:23)  T(F): 97.3 (01 Nov 2021 11:39), Max: 98.2 (31 Oct 2021 20:23)  HR: 77 (01 Nov 2021 11:39) (67 - 87)  BP: 105/48 (01 Nov 2021 11:39) (96/56 - 118/61)  RR: 18 (01 Nov 2021 11:39) (18 - 21)  SpO2: 100% (01 Nov 2021 11:39) (95% - 100%)    PHYSICAL EXAM:  GENERAL: Comfortable, no acute distress  HEAD:  Atraumatic, Normocephalic  EYES: EOMI, PERRLA  HEENT: Moist mucous membranes  NECK: Supple, No JVD  NERVOUS SYSTEM:  Alert & Oriented X3, Motor Strength 5/5 B/L upper and lower extremities  CHEST/LUNG: Clear to auscultation bilaterally  HEART: irregularly irregular.   ABDOMEN: Soft, Nontender, Nondistended; Bowel sounds present  GENITOURINARY- Voiding, no palpable bladder  EXTREMITIES:  No clubbing, cyanosis, or edema  MUSCULOSKELETAL- LEft hip dressing intact, no drainage. +underlying hematoma.  SKIN-no rash    LABS:                        9.9    8.12  )-----------( 206      ( 01 Nov 2021 09:52 )             31.9     11-01    136  |  105  |  31<H>  ----------------------------<  104<H>  3.7   |  24  |  0.83    Ca    8.5      01 Nov 2021 09:52      PT/INR - ( 01 Nov 2021 07:54 )   PT: 28.5 sec;   INR: 2.57 ratio         MEDICATIONS  (STANDING):  acetaminophen     Tablet .. 650 milliGRAM(s) Oral every 6 hours  aspirin  chewable 81 milliGRAM(s) Oral daily  atorvastatin 10 milliGRAM(s) Oral at bedtime  furosemide    Tablet 20 milliGRAM(s) Oral daily  levothyroxine 100 MICROGram(s) Oral daily  metoprolol tartrate 12.5 milliGRAM(s) Oral two times a day  midodrine. 5 milliGRAM(s) Oral three times a day  mirtazapine 7.5 milliGRAM(s) Oral at bedtime  pantoprazole  Injectable 40 milliGRAM(s) IV Push daily  polyethylene glycol 3350 17 Gram(s) Oral daily  senna 2 Tablet(s) Oral at bedtime  warfarin 1.5 milliGRAM(s) Oral daily    MEDICATIONS  (PRN):  benzocaine 15 mG/menthol 3.6 mG (Sugar-Free) Lozenge 1 Lozenge Oral four times a day PRN Sore Throat  ondansetron Injectable 4 milliGRAM(s) IV Push every 4 hours PRN Nausea and/or Vomiting  traMADol 25 milliGRAM(s) Oral every 6 hours PRN Moderate Pain (4 - 6)  traMADol 50 milliGRAM(s) Oral every 6 hours PRN Severe Pain (7 - 10)    ASSESSMENT AND PLAN  89 y/o F with PMH A fib on Warfarin, HTN, HLD, hypothyroidism, CHFpEF, pleural effusions, severe pulmonary HTN, mild to moderate mitral regurgitation, severe tricuspid valve regurgitation presents for a fall A/W:    #Left intertrochanteric hip fracture secondary to mechanical fall   - S/P IM nail POD#6  - prn tramadol/tylenol for pain  - physical therapy  - incentive spirometry  - bowel regimen.     #Acute blood loss anemia:  -d/t surgery  -no need for transfusion at this time.     #Nausea/vomiting- resolved  No bowel obstruction on CT   Has not been taking narcotics at least >24h  Troponin borderline, likely demand ischemia due to rapid afib earlier  Started on Protonix IV  Antiemetics prn  States no appetite- started on small dose of remeron 7.5mg tonight for appetite stimulant property    #Acute on chronic diastolic CHF  #B/l pleural effusions  #Severe AS  -improved  -changed to po diuretics.     #Parox AFib   -rate better controlled on BB  -AC by coumadin    #Hyponatremia  #Mild PRIYANKA  Likely due to dehydration/volume depletion  Resolved    #Hyperkalemia  -resolved.    #UTI  -completed treatment X 5 days      #HLD:  -statin.     #Hypothyroidism  -synthroid.     #DVT px:  coumadin    #Dispo  -rhea today  
 The patient was seen and examined.  She was nauseous with vomiting this morning.  No bowel movements recently.  No chest discomfort or shortness of breath.  Heart rate stable.    ________________________________  ELIZABETH LACY is a 89y year old Female with a past medical history  chronic diastolic heart failure no prior history of pleural effusion, chronic atrial fibrillation on anticoagulation with warfarin, hypertension, hyperlipidemia, moderate mitral valve regurgitation, severe aortic stenosis, with severe pulmonary hypertension who is known to our practice.  She now presents status post fall, resulting in a left hip fracture.  She states that her leg gave out and she fell in the kitchen floor.  She did not lose consciousness.  She denies any head trauma.  She is status post hip surgery.    Cardiology consulted for management of chronic diastolic heart failure.    Currently she denies any chest pain or shortness of breath.  She denies any dizziness or lightheadedness.  HR has been been stable on telemetry.           ________________________________  Review of systems: A 10 point review of system has been performed, and is negative except for what has been mentioned in the above history of present illness.     PAST MEDICAL & SURGICAL HISTORY:  AF (atrial fibrillation)  On Warfarin    HTN (hypertension)    Hyperlipidemia    Hypothyroid    Congestive heart failure  Preserved EF    Pleural effusion    Severe pulmonary hypertension    Moderate mitral regurgitation    Severe tricuspid regurgitation    S/P appendectomy      FAMILY HISTORY:  FH: lung cancer (Father)       The patient denies any history of premature CAD or sudden cardiac death.    SOCIAL HISTORY: The patient denies any history of tobacco abuse, alcohol abuse or illicit drug use.    ALLERGIES:  Bactrim (Unknown)    Home Medications:  Melatonin 3 mg oral tablet: 2 tab(s) orally once (at bedtime) (26 Oct 2021 00:47)  pravastatin 20 mg oral tablet: 1 tab(s) orally once a day (at bedtime) (26 Oct 2021 00:47)  spironolactone 25 mg oral tablet: 0.5 tab(s) orally once a day (26 Oct 2021 00:47)  warfarin 1 mg oral tablet: 1.5 tab(s) orally once a day (at bedtime) on Tuesday and Saturday  (26 Oct 2021 00:47)  warfarin 2 mg oral tablet: 1 tab(s) orally once a day (at bedtime) Monday, Wednesday, Thursday, Friday, and  (26 Oct 2021 00:47)    MEDICATIONS  (STANDING):  acetaminophen     Tablet .. 650 milliGRAM(s) Oral every 6 hours  aspirin  chewable 81 milliGRAM(s) Oral daily  atorvastatin 10 milliGRAM(s) Oral at bedtime  heparin   Injectable 5000 Unit(s) SubCutaneous every 8 hours  levothyroxine 100 MICROGram(s) Oral daily  melatonin 6 milliGRAM(s) Oral at bedtime  metoprolol tartrate 12.5 milliGRAM(s) Oral two times a day  midodrine. 2.5 milliGRAM(s) Oral three times a day  polyethylene glycol 3350 17 Gram(s) Oral daily  senna 2 Tablet(s) Oral at bedtime  warfarin 3 milliGRAM(s) Oral daily    MEDICATIONS  (PRN):  benzocaine 15 mG/menthol 3.6 mG (Sugar-Free) Lozenge 1 Lozenge Oral four times a day PRN Sore Throat  morphine  - Injectable 4 milliGRAM(s) IV Push every 4 hours PRN Severe Pain (7 - 10)  ondansetron   Disintegrating Tablet 4 milliGRAM(s) Oral three times a day PRN Nausea and/or Vomiting  traMADol 50 milliGRAM(s) Oral every 4 hours PRN Moderate Pain (4 - 6)  traMADol 100 milliGRAM(s) Oral every 4 hours PRN Severe Pain (7 - 10)    Vital Signs Last 24 Hrs  T(C): 36.4 (28 Oct 2021 08:19), Max: 37 (27 Oct 2021 23:32)  T(F): 97.5 (28 Oct 2021 08:19), Max: 98.6 (27 Oct 2021 23:32)  HR: 90 (28 Oct 2021 08:19) (74 - 90)  BP: 136/59 (28 Oct 2021 08:19) (91/56 - 136/59)  BP(mean): --  RR: 18 (28 Oct 2021 08:19) (18 - 19)  SpO2: 100% (28 Oct 2021 08:19) (98% - 100%)  I&O's Summary    ________________________________  GENERAL APPEARANCE:  No acute distress  HEAD: normocephalic, atraumatic  NECK: supple, no jugular venous distention, no carotid bruit    HEART: Irregularly irregular, S1, S2 normal, 1/6 murmur    CHEST:  No anterior chest wall tenderness    LUNGS: Trace crackles at right base    ABDOMEN: soft, nontender, nondistended, with positive bowel sounds appreciated  EXTREMITIES: no edema.   NEURO: Alert and oriented x3  PSYC:  Normal affect  SKIN:  Dry  ________________________________   TELEMETRY: Rate controlled A. fib    ECG: Atrial fibrillation with nonspecific ST-T wave changes, which appear to be chronic    LABS:                        8.4    10.74 )-----------( 120      ( 28 Oct 2021 08:24 )             26.8             10-28    132<L>  |  101  |  49<H>  ----------------------------<  111<H>  5.2   |  26  |  1.42<H>    Ca    8.7      28 Oct 2021 08:24        PT/INR - ( 28 Oct 2021 10:50 )   PT: 18.5 sec;   INR: 1.63 ratio           Urinalysis Basic - ( 26 Oct 2021 18:15 )    Color: Yellow / Appearance: Slightly Turbid / S.025 / pH: x  Gluc: x / Ketone: Trace  / Bili: Negative / Urobili: Negative mg/dL   Blood: x / Protein: 15 mg/dL / Nitrite: Negative   Leuk Esterase: Moderate / RBC: 0-2 /HPF / WBC 26-50   Sq Epi: x / Non Sq Epi: Moderate / Bacteria: Moderate        PT/INR - ( 28 Oct 2021 10:50 )   PT: 18.5 sec;   INR: 1.63 ratio           Urinalysis Basic - ( 26 Oct 2021 18:15 )    Color: Yellow / Appearance: Slightly Turbid / S.025 / pH: x  Gluc: x / Ketone: Trace  / Bili: Negative / Urobili: Negative mg/dL   Blood: x / Protein: 15 mg/dL / Nitrite: Negative   Leuk Esterase: Moderate / RBC: 0-2 /HPF / WBC 26-50   Sq Epi: x / Non Sq Epi: Moderate / Bacteria: Moderate             ________________________________    RADIOLOGY & ADDITIONAL STUDIES: IMPRESSION:  Small left effusion.    IMPRESSION: Left hip fracture.    The patient subsequently underwent ORIFof the left hip.    Echocardiogram: 2020-  --There is severe left atrial dilatation (LA volume index 65 ml/m²).  --There is mild left ventricular hypertrophy.  --LV global wall motion is hypokinetic.  --LV ejection fraction (50 %) is borderline normal.  --Left ventricular ejection fraction 50 to 55%. There is asymmetric apical hypertrophy present.  --There is severe right atrial dilatation.  --The aortic root is normal in size (3.40 cm). Ascending aorta is dilated; its diameter is 3.90   cm (2.6 cm/m²).  --The aortic valve is moderately calcified. There is moderate aortic stenosis. The aortic valve   area, by the continuity equation, is 1.03 cm². There is mild aortic regurgitation.  --There is mild to moderate mitral regurgitation.  --There is moderate to severe tricuspid regurgitation.  --There is mild pulmonic regurgitation.  --The right atrial pressure is elevated (11-20 mm Hg). There is severe pulmonary hypertension.  --There is a trace pericardial effusion. Left pleural effusion is present. Right pleural   effusion is present.    LIVER: Within normal limits.  BILE DUCTS: Normal caliber.  GALLBLADDER: Within normal limits.  SPLEEN: Within normal limits. Small accessory spleen.  PANCREAS: Within normal limits.  ADRENALS: Within normal limits.  KIDNEYS/URETERS: Trace bilateral perinephric stranding. Otherwise, within normal limits.    BLADDER: Within normal limits.  REPRODUCTIVE ORGANS: Calcified uterine fibroids. The adnexa are unremarkable by CT criteria.    BOWEL: Diverticulosis of the sigmoid colon. No bowel obstruction.  PERITONEUM: No ascites.  VESSELS: Extensive atherosclerotic calcification of the nonaneurysmal abdominal aorta and iliac arteries.  RETROPERITONEUM/LYMPH NODES: No lymphadenopathy.  ABDOMINAL WALL: Within normal limits.  BONES: Status post intertrochanteric rebekah and interlocking femoral neck pin fixation of the left femur. There is swelling and infiltration of the soft tissues of the left hip with subcutaneous gas, compatible with recent surgery.      ________________________________    ASSESSMENT:  Chronic diastolic heart failure, compensated at this moment  Chronic atrial fibrillation on anticoagulation  Chronic venous insufficiency  Hypertension  Hyperlipidemia  Moderate mitral valve regurgitation  Severe aortic stenosis, manage medically-no plan for surgical intervention or TAVR  Left hip fracture due to mechanical fall status post surgery  Acute renal insufficiency with chronic CKD      PLAN:  In summary, this is a 89 year-old female with a past medical history of severe stenosis, chronic diastolic heart failure, severe pulmonary hypertension, who is known to our practice.  She has been followed in outpatient for severe stenosis, there is no plan for TAVR.  CT reviewed, showing small bilateral pleural effusion.  Will give 1 dose of IV diuretic today and reassess tomorrow.  Heart rate stable.  No bowel obstruction on CT.  Continue midodrine-if further low blood pressure, will increase dose to 5 mg.    Continue anticoagulation.  Continue statin.         __________________________________________________________________________  Thank you for allowing me to participate in the care of your patient. Please contact me should any questions arise.    ALFONZO Ayala DO, [facility]  [phone]    
Orthopedics      Patient seen and examined at bedside. Feeling well. Pain controlled. No n/v. No acute events overnight.    Vital Signs Last 24 Hrs  T(C): 36.8 (10-26-21 @ 02:52), Max: 36.8 (10-26-21 @ 02:52)  T(F): 98.2 (10-26-21 @ 02:52), Max: 98.2 (10-26-21 @ 02:52)  HR: 60 (10-26-21 @ 02:52) (60 - 96)  BP: 141/75 (10-26-21 @ 02:52) (125/84 - 141/75)  BP(mean): 95 (10-25-21 @ 23:23) (95 - 95)  RR: 18 (10-26-21 @ 02:52) (18 - 19)  SpO2: 100% (10-26-21 @ 02:52) (99% - 100%)                        11.7   14.72 )-----------( 148      ( 25 Oct 2021 20:11 )             36.8     25 Oct 2021 20:11    135    |  103    |  31     ----------------------------<  149    4.6     |  24     |  1.17     Ca    9.3        25 Oct 2021 20:11    TPro  7.6    /  Alb  4.0    /  TBili  0.9    /  DBili  x      /  AST  31     /  ALT  17     /  AlkPhos  90     25 Oct 2021 20:11    PT/INR - ( 25 Oct 2021 20:11 )   PT: 31.2 sec;   INR: 2.80 ratio         PTT - ( 25 Oct 2021 20:11 )  PTT:34.7 sec    Gen: NAD, Resting comfortably    LLE:  Skin intact, no erythema or ecchymosis  pos bony tenderness to palpation over left hip  +EHL/FHL/TA/GSC  +SILT L3-S1  + DP  Compartments soft and compressible  No calf tenderness        A/P: 89F w left IT fx    fx that needs operative fixation, plan for OR today w Dr Pennington for IMN, all questions answered pt agrees with plan  medical management and optimization appreciated   Analgesia  NWB  DVT ppx hold   preop labs  npo    FU INR downtrend  Ice and elevate as tolerated  Discussed with attending who is in agreement with above plan  
Patient seen and examined at bedside, resting comfortably. Pain well controlled. Denies numbness/tingling. ROS limited 2/2 pts mental status.     Vital Signs Last 24 Hrs  T(C): 36.4 (01 Nov 2021 05:00), Max: 36.8 (31 Oct 2021 20:23)  T(F): 97.5 (01 Nov 2021 05:00), Max: 98.2 (31 Oct 2021 20:23)  HR: 70 (01 Nov 2021 05:00) (67 - 87)  BP: 118/61 (01 Nov 2021 05:00) (96/56 - 118/61)  BP(mean): --  RR: 18 (01 Nov 2021 05:00) (18 - 21)  SpO2: 100% (01 Nov 2021 05:00) (95% - 100%)    Labs:             9.8    7.77  )-----------( 163      ( 31 Oct 2021 08:12 )             31.4         10-31    140  |  109<H>  |  28<H>  ----------------------------<  82  4.0   |  23  |  0.73        PHYSICAL EXAM  General: NAD, Awake, Dementia at baseline   LEFT Lower Extremity:  Dressing mild spotting to superior dressing. distal dressing c/d/i  SCDs in place  SILT L2-S1  +TA/EHL/FHL/GSC  + DP pulses  Compartments soft and compressible  Calves nontender      
Patient seen and examined at bedside, resting comfortably. Pain well controlled. Denies numbness/tingling. ROS limited 2/2 pts mental status.     Vital Signs Last 24 Hrs  T(C): 36.8 (31 Oct 2021 06:10), Max: 36.8 (30 Oct 2021 17:08)  T(F): 98.2 (31 Oct 2021 06:10), Max: 98.2 (30 Oct 2021 17:08)  HR: 78 (31 Oct 2021 06:10) (63 - 80)  BP: 115/57 (31 Oct 2021 06:10) (96/57 - 117/59)  BP(mean): 74 (31 Oct 2021 06:10) (74 - 74)  RR: 18 (30 Oct 2021 21:45) (18 - 19)  SpO2: 95% (30 Oct 2021 21:45) (94% - 96%)    Labs:                        9.4    7.24  )-----------( 145      ( 30 Oct 2021 08:14 )             29.5         10-30    x   |  x   |  x   ----------------------------<  x   5.5<H>   |  x   |  x         PHYSICAL EXAM  General: NAD, Awake, Dementia at baseline   LEFT Lower Extremity:  Dressing mild spotting to superior dressing. distal dressing c/d/i  SCDs in place  SILT L2-S1  +TA/EHL/FHL/GSC  + DP pulses  Compartments soft and compressible  Calves nontender      
The patient was seen and examined.  Blood pressure  low  but stable. Heart rate stable. No chest comfort or shortness of breath.      Initial Consult HPI    ________________________________  ELIZABETH LACY is a 89y year old Female with a past medical history  chronic diastolic heart failure no prior history of pleural effusion, chronic atrial fibrillation on anticoagulation with warfarin, hypertension, hyperlipidemia, moderate mitral valve regurgitation, severe aortic stenosis, with severe pulmonary hypertension who is known to our practice.  She now presents status post fall, resulting in a left hip fracture.  She states that her leg gave out and she fell in the kitchen floor.  She did not lose consciousness.  She denies any head trauma.  She is status post hip surgery.    Cardiology consulted for management of chronic diastolic heart failure.    Currently she denies any chest pain or shortness of breath.  She denies any dizziness or lightheadedness.  HR has been  stable on telemetry.     ________________________________  Review of systems: A 10 point review of system has been performed, and is negative except for what has been mentioned in the above history of present illness.     PAST MEDICAL & SURGICAL HISTORY:  AF (atrial fibrillation)  On Warfarin    HTN (hypertension)    Hyperlipidemia    Hypothyroid    Congestive heart failure  Preserved EF    Pleural effusion    Severe pulmonary hypertension    Moderate mitral regurgitation    Severe tricuspid regurgitation    S/P appendectomy     ALLERGIES:  Bactrim (Unknown)    Home Medications:  Melatonin 3 mg oral tablet: 2 tab(s) orally once (at bedtime) (26 Oct 2021 00:47)  pravastatin 20 mg oral tablet: 1 tab(s) orally once a day (at bedtime) (26 Oct 2021 00:47)  spironolactone 25 mg oral tablet: 0.5 tab(s) orally once a day (26 Oct 2021 00:47)  warfarin 1 mg oral tablet: 1.5 tab(s) orally once a day (at bedtime) on Tuesday and Saturday  (26 Oct 2021 00:47)  warfarin 2 mg oral tablet: 1 tab(s) orally once a day (at bedtime) Monday, Wednesday, Thursday, Friday, and Sunday (26 Oct 2021 00:47)      MEDICATIONS  (STANDING):  acetaminophen     Tablet .. 650 milliGRAM(s) Oral every 6 hours  aspirin  chewable 81 milliGRAM(s) Oral daily  atorvastatin 10 milliGRAM(s) Oral at bedtime  cefTRIAXone Injectable.      cefTRIAXone Injectable. 1000 milliGRAM(s) IV Push every 24 hours  furosemide    Tablet 20 milliGRAM(s) Oral daily  levothyroxine 100 MICROGram(s) Oral daily  metoprolol tartrate 12.5 milliGRAM(s) Oral two times a day  midodrine. 5 milliGRAM(s) Oral three times a day  mirtazapine 7.5 milliGRAM(s) Oral at bedtime  pantoprazole  Injectable 40 milliGRAM(s) IV Push daily  polyethylene glycol 3350 17 Gram(s) Oral daily  senna 2 Tablet(s) Oral at bedtime    MEDICATIONS  (PRN):  benzocaine 15 mG/menthol 3.6 mG (Sugar-Free) Lozenge 1 Lozenge Oral four times a day PRN Sore Throat  ondansetron Injectable 4 milliGRAM(s) IV Push every 4 hours PRN Nausea and/or Vomiting  traMADol 25 milliGRAM(s) Oral every 6 hours PRN Moderate Pain (4 - 6)  traMADol 50 milliGRAM(s) Oral every 6 hours PRN Severe Pain (7 - 10)      Vital Signs Last 24 Hrs  T(C): 36.8 (31 Oct 2021 20:23), Max: 36.8 (31 Oct 2021 06:10)  T(F): 98.2 (31 Oct 2021 20:23), Max: 98.2 (31 Oct 2021 06:10)  HR: 80 (31 Oct 2021 20:23) (67 - 80)  BP: 112/56 (31 Oct 2021 20:23) (96/56 - 115/57)  BP(mean): 74 (31 Oct 2021 06:10) (74 - 74)  RR: 21 (31 Oct 2021 20:23) (18 - 21)  SpO2: 100% (31 Oct 2021 20:23) (95% - 100%)  I&O's Summary    30 Oct 2021 07:01  -  31 Oct 2021 07:00  --------------------------------------------------------  IN: 0 mL / OUT: 1750 mL / NET: -1750 mL      ________________________________  GENERAL APPEARANCE:  No acute distress  HEAD: normocephalic, atraumatic  NECK: supple, no jugular venous distention, no carotid bruit    HEART: Irregularly irregular, S1, S2 normal, 1/6 murmur    CHEST:  No anterior chest wall tenderness    LUNGS: Trace crackles at right base    ABDOMEN: soft, nontender, nondistended, with positive bowel sounds appreciated  EXTREMITIES: no edema.   NEURO: Alert and oriented x3  PSYC:  Normal affect  SKIN:  Dry  ________________________________   TELEMETRY: Rate controlled A. fib    ECG: Atrial fibrillation with nonspecific ST-T wave changes, which appear to be chronic    LABS:                                        9.8    7.77  )-----------( 163      ( 31 Oct 2021 08:12 )             31.4          10-31    140  |  109<H>  |  28<H>  ----------------------------<  82  4.0   |  23  |  0.73    Ca    8.4<L>      31 Oct 2021 08:12          PT/INR - ( 31 Oct 2021 08:12 )   PT: 35.9 sec;   INR: 3.25 ratio                               ________________________________    RADIOLOGY & ADDITIONAL STUDIES: IMPRESSION:  Small left effusion.    IMPRESSION: Left hip fracture.    The patient subsequently underwent ORIFof the left hip.    Echocardiogram: October 26, 2020-  --There is severe left atrial dilatation (LA volume index 65 ml/m²).  --There is mild left ventricular hypertrophy.  --LV global wall motion is hypokinetic.  --LV ejection fraction (50 %) is borderline normal.  --Left ventricular ejection fraction 50 to 55%. There is asymmetric apical hypertrophy present.  --There is severe right atrial dilatation.  --The aortic root is normal in size (3.40 cm). Ascending aorta is dilated; its diameter is 3.90   cm (2.6 cm/m²).  --The aortic valve is moderately calcified. There is moderate aortic stenosis. The aortic valve   area, by the continuity equation, is 1.03 cm². There is mild aortic regurgitation.  --There is mild to moderate mitral regurgitation.  --There is moderate to severe tricuspid regurgitation.  --There is mild pulmonic regurgitation.  --The right atrial pressure is elevated (11-20 mm Hg). There is severe pulmonary hypertension.  --There is a trace pericardial effusion. Left pleural effusion is present. Right pleural   effusion is present.    LIVER: Within normal limits.  BILE DUCTS: Normal caliber.  GALLBLADDER: Within normal limits.  SPLEEN: Within normal limits. Small accessory spleen.  PANCREAS: Within normal limits.  ADRENALS: Within normal limits.  KIDNEYS/URETERS: Trace bilateral perinephric stranding. Otherwise, within normal limits.    BLADDER: Within normal limits.  REPRODUCTIVE ORGANS: Calcified uterine fibroids. The adnexa are unremarkable by CT criteria.    BOWEL: Diverticulosis of the sigmoid colon. No bowel obstruction.  PERITONEUM: No ascites.  VESSELS: Extensive atherosclerotic calcification of the nonaneurysmal abdominal aorta and iliac arteries.  RETROPERITONEUM/LYMPH NODES: No lymphadenopathy.  ABDOMINAL WALL: Within normal limits.  BONES: Status post intertrochanteric rebekah and interlocking femoral neck pin fixation of the left femur. There is swelling and infiltration of the soft tissues of the left hip with subcutaneous gas, compatible with recent surgery.      ________________________________    ASSESSMENT:  Chronic diastolic heart failure, compensated at this moment  Chronic atrial fibrillation on anticoagulation  Chronic venous insufficiency  Hypertension  Hyperlipidemia  Moderate mitral valve regurgitation  Severe aortic stenosis, manage medically-no plan for surgical intervention or TAVR  Left hip fracture due to mechanical fall status post surgery  Acute renal insufficiency with chronic CKD      PLAN:  In summary, this is a 89 year-old female with a past medical history of severe stenosis, chronic diastolic heart failure, severe pulmonary hypertension, who is known to our practice.  She has been followed in outpatient for severe stenosis, there is no plan for TAVR.  CT reviewed, showing small bilateral pleural effusion.      Switch to oral diuretics.     Cardiac enzymes due to demand ischemia. Conservative mgt/  Cont increased dose of midodrine.  Continue anticoagulation for INR 2-3  Continue statin.  Out of bed to chair.    Will followPRN       __________________________________________________________________________  Thank you for allowing me to participate in the care of your patient. Please contact me should any questions arise.    ALFONZO Ayala DO, FACC  [phone]    
c/c: fall/left hip pain    HPI:  89 y/o F with PMH A fib on Warfarin, HTN, HLD, hypothyroidism, CHFpEF, pleural effusions, severe pulmonary HTN, mild to moderate mitral regurgitation, severe tricuspid valve regurgitation presents for a fall. The pt reports that her left leg gave out and she fell down onto the kitchen floor. She believes this occurred around 2 pm and her son did not come home and find her until 4 pm. She did not lose consciousness. Does report hitting her head on the floor. She is complaining of 10/10 pain located at the left hip described as an ache. She does use a scooter to at home adn does walk around on her own but will try to hold onto something. Does not walk very long distances. Denies chest pain or shortness of breath at rest or on exertion, fevers, chills, abdominal pain, N/V, diarrhea/constipation.     She was admitted with Left hip fracture.   Underwent IM nail 10/26.   Post op transferred to [facility] for rapid a fib.   required ivf for hypotension.   Hospital course further complicated by acute chf exacerbation requiring iv lasix. noted to have severe aortic stenosis.     PT seen and examined this am. Felt so-so. no sob/chest pain. Sitting up in chair. Hip pain controlled.     Review of system- All 10 systems reviewed and is as per HPI otherwise negative.     Vital Signs Last 24 Hrs  T(C): 36.4 (31 Oct 2021 08:33), Max: 36.8 (30 Oct 2021 17:08)  T(F): 97.6 (31 Oct 2021 08:33), Max: 98.2 (30 Oct 2021 17:08)  HR: 77 (31 Oct 2021 08:33) (63 - 80)  BP: 104/53 (31 Oct 2021 08:33) (96/68 - 117/59)  BP(mean): 74 (31 Oct 2021 06:10) (74 - 74)  RR: 18 (31 Oct 2021 08:33) (18 - 19)  SpO2: 95% (31 Oct 2021 08:33) (95% - 96%)    PHYSICAL EXAM:  GENERAL: Comfortable, no acute distress  HEAD:  Atraumatic, Normocephalic  EYES: EOMI, PERRLA  HEENT: Moist mucous membranes  NECK: Supple, No JVD  NERVOUS SYSTEM:  Alert & Oriented X3, Motor Strength 5/5 B/L upper and lower extremities  CHEST/LUNG: Clear to auscultation bilaterally  HEART: irregularly irregular.   ABDOMEN: Soft, Nontender, Nondistended; Bowel sounds present  GENITOURINARY- Voiding, no palpable bladder  EXTREMITIES:  No clubbing, cyanosis, or edema  MUSCULOSKELETAL- LEft hip dressing intact, no drainage. +underlying hematoma.  SKIN-no rash  LABS:                        9.8    7.77  )-----------( 163      ( 31 Oct 2021 08:12 )             31.4     10-31    140  |  109<H>  |  28<H>  ----------------------------<  82  4.0   |  23  |  0.73    Ca    8.4<L>      31 Oct 2021 08:12      PT/INR - ( 31 Oct 2021 08:12 )   PT: 35.9 sec;   INR: 3.25 ratio           MEDICATIONS  (STANDING):  acetaminophen     Tablet .. 650 milliGRAM(s) Oral every 6 hours  aspirin  chewable 81 milliGRAM(s) Oral daily  atorvastatin 10 milliGRAM(s) Oral at bedtime  cefTRIAXone Injectable.      furosemide   Injectable 20 milliGRAM(s) IV Push daily  levothyroxine 100 MICROGram(s) Oral daily  melatonin 6 milliGRAM(s) Oral at bedtime  metoprolol tartrate 12.5 milliGRAM(s) Oral two times a day  midodrine. 2.5 milliGRAM(s) Oral three times a day  pantoprazole  Injectable 40 milliGRAM(s) IV Push daily  polyethylene glycol 3350 17 Gram(s) Oral daily  senna 2 Tablet(s) Oral at bedtime    MEDICATIONS  (PRN):  benzocaine 15 mG/menthol 3.6 mG (Sugar-Free) Lozenge 1 Lozenge Oral four times a day PRN Sore Throat  morphine  - Injectable 4 milliGRAM(s) IV Push every 4 hours PRN Severe Pain (7 - 10)  ondansetron Injectable 4 milliGRAM(s) IV Push every 4 hours PRN Nausea and/or Vomiting  traMADol 50 milliGRAM(s) Oral every 4 hours PRN Moderate Pain (4 - 6)  traMADol 100 milliGRAM(s) Oral every 4 hours PRN Severe Pain (7 - 10)    ASSESSMENT AND PLAN  89 y/o F with PMH A fib on Warfarin, HTN, HLD, hypothyroidism, CHFpEF, pleural effusions, severe pulmonary HTN, mild to moderate mitral regurgitation, severe tricuspid valve regurgitation presents for a fall A/W:    #Left intertrochanteric hip fracture secondary to mechanical fall   - S/P IM nail POD#5  - prn tramadol/tylenol for pain  - physical therapy  - incentive spirometry  - bowel regimen.     #Acute blood loss anemia:  -d/t surgery  -no need for transfusion at this time.     #Nausea/vomiting- resolved  No bowel obstruction on CT   Has not been taking narcotics at least >24h  Troponin borderline, likely demand ischemia due to rapid afib earlier  Started on Protonix IV  Antiemetics prn  States no appetite- started on small dose of remeron 7.5mg tonight for appetite stimulant property    #Acute on chronic diastolic CHF  #B/l pleural effusions  #Severe AS  -improving  -change iv lasix to po.     #Parox AFib   -rate better controlled on BB  -AC by coumadin    #Hyponatremia  #Mild PRIYANKA  Likely due to dehydration/volume depletion  Resolved    #Hyperkalemia  -resolved.    #UTI  -completed treatment X 5 days  -can dc abx.    #HLD:  -statin.     #Hypothyroidism  -synthroid.     #DVT px:  coumadin    #Dispo  -rhea monday  
c/c: fall/left hip pain    HPI:  89 y/o F with PMH A fib on Warfarin, HTN, HLD, hypothyroidism, CHFpEF, pleural effusions, severe pulmonary HTN, mild to moderate mitral regurgitation, severe tricuspid valve regurgitation presents for a fall. The pt reports that her left leg gave out and she fell down onto the kitchen floor. She believes this occurred around 2 pm and her son did not come home and find her until 4 pm. She did not lose consciousness. Does report hitting her head on the floor. She is complaining of 10/10 pain located at the left hip described as an ache. She does use a scooter to at home adn does walk around on her own but will try to hold onto something. Does not walk very long distances. Denies chest pain or shortness of breath at rest or on exertion, fevers, chills, abdominal pain, N/V, diarrhea/constipation.     She was admitted with Left hip fracture.   Underwent IM nail 10/26.   Post op transferred to [facility] for rapid a fib.   required ivf for hypotension.     pt seen and examined this am. c/o pain left hip. no sob/chest pain. no n/v/d/abd pain. Tolerating po. hadn't been out of bed yet.       Review of system- All 10 systems reviewed and is as per HPI otherwise negative.         VITALS  T(C): 36.6 (10-27-21 @ 08:22), Max: 37.4 (10-26-21 @ 22:15)  HR: 92 (10-27-21 @ 08:22) (92 - 129)  BP: 107/58 (10-27-21 @ 08:22) (94/78 - 164/65)  RR: 18 (10-27-21 @ 08:22) (12 - 20)  SpO2: 100% (10-27-21 @ 08:22) (94% - 100%)      PHYSICAL EXAM:    GENERAL: Comfortable, no acute distress  HEAD:  Atraumatic, Normocephalic  EYES: EOMI, PERRLA  HEENT: Moist mucous membranes  NECK: Supple, No JVD  NERVOUS SYSTEM:  Alert & Oriented X3, Motor Strength 5/5 B/L upper and lower extremities  CHEST/LUNG: Clear to auscultation bilaterally  HEART: irregularly irregular.   ABDOMEN: Soft, Nontender, Nondistended; Bowel sounds present  GENITOURINARY- Voiding, no palpable bladder  EXTREMITIES:  No clubbing, cyanosis, or edema  MUSCULOSKELETAL- LEft hip dressing intact, no drainage. +underlying hematoma.  SKIN-no rash        LABS:                        8.7    8.09  )-----------( 101      ( 27 Oct 2021 08:23 )             27.4     10-27    138  |  106  |  31<H>  ----------------------------<  134<H>  5.0   |  25  |  1.08    Ca    8.6      27 Oct 2021 08:23    TPro  x   /  Alb  3.3  /  TBili  x   /  DBili  x   /  AST  x   /  ALT  x   /  AlkPhos  x   10    PT/INR - ( 27 Oct 2021 10:45 )   PT: 16.0 sec;   INR: 1.39 ratio         PTT - ( 26 Oct 2021 07:55 )  PTT:35.6 sec  Urinalysis Basic - ( 26 Oct 2021 18:15 )    Color: Yellow / Appearance: Slightly Turbid / S.025 / pH: x  Gluc: x / Ketone: Trace  / Bili: Negative / Urobili: Negative mg/dL   Blood: x / Protein: 15 mg/dL / Nitrite: Negative   Leuk Esterase: Moderate / RBC: 0-2 /HPF / WBC 26-50   Sq Epi: x / Non Sq Epi: Moderate / Bacteria: Moderate      MEDS  acetaminophen     Tablet .. 650 milliGRAM(s) Oral every 6 hours  aspirin  chewable 81 milliGRAM(s) Oral daily  atorvastatin 10 milliGRAM(s) Oral at bedtime  benzocaine 15 mG/menthol 3.6 mG (Sugar-Free) Lozenge 1 Lozenge Oral four times a day PRN  heparin   Injectable 5000 Unit(s) SubCutaneous every 8 hours  lactated ringers. 1000 milliLiter(s) IV Continuous <Continuous>  lactated ringers. 1000 milliLiter(s) IV Continuous <Continuous>  levothyroxine 100 MICROGram(s) Oral daily  melatonin 6 milliGRAM(s) Oral at bedtime  melatonin 3 milliGRAM(s) Oral at bedtime PRN  midodrine. 2.5 milliGRAM(s) Oral three times a day  morphine  - Injectable 4 milliGRAM(s) IV Push every 4 hours PRN  ondansetron   Disintegrating Tablet 4 milliGRAM(s) Oral three times a day PRN  polyethylene glycol 3350 17 Gram(s) Oral daily  senna 2 Tablet(s) Oral at bedtime  sodium chloride 0.9%. 1000 milliLiter(s) IV Continuous <Continuous>  traMADol 50 milliGRAM(s) Oral every 4 hours PRN  traMADol 100 milliGRAM(s) Oral every 4 hours PRN  warfarin 5 milliGRAM(s) Oral once      ASSESSMENT AND PLAN  89 y/o F with PMH A fib on Warfarin, HTN, HLD, hypothyroidism, CHFpEF, pleural effusions, severe pulmonary HTN, mild to moderate mitral regurgitation, severe tricuspid valve regurgitation presents for a fall A/W:    1. Left intertrochanteric hip fracture secondary to mechanical fall   - S/P IM nail POD#1  - prn tramadol/tylenol for pain  - physical therapy  - incentive spirometry  - bowel regimen.     2. Acute blood loss anemia:  -d/t surgery  -no need for transfusion at this time.     3. PAF with rapid a fib earlier today.   -start low dose bb for better rate control.   -on coumadin  -cardio to see    4. HFpEF/valvular heart disease:  -stable.   -hold lasix/spironolactone today given low bp and requirement of ivf bolus overnight.   -on midodrine   -re-eval in am.     5. HLD:  -statin.     6. Hypothyroid:  -synthroid.     7. DVT px:  hep sq until inr therapeutic per a/c services.    8. dispo:  does not want rhea.   PT eval pending.   if pt goes home, dc planning likely friday if stable.  
c/c: fall/left hip pain    HPI:  89 y/o F with PMH A fib on Warfarin, HTN, HLD, hypothyroidism, CHFpEF, pleural effusions, severe pulmonary HTN, mild to moderate mitral regurgitation, severe tricuspid valve regurgitation presents for a fall. The pt reports that her left leg gave out and she fell down onto the kitchen floor. She believes this occurred around 2 pm and her son did not come home and find her until 4 pm. She did not lose consciousness. Does report hitting her head on the floor. She is complaining of 10/10 pain located at the left hip described as an ache. She does use a scooter to at home adn does walk around on her own but will try to hold onto something. Does not walk very long distances. Denies chest pain or shortness of breath at rest or on exertion, fevers, chills, abdominal pain, N/V, diarrhea/constipation.     She was admitted with Left hip fracture.   Underwent IM nail 10/26.   Post op transferred to tele for rapid a fib.   required ivf for hypotension.     pt seen and examined this am. c/o pain left hip. no sob/chest pain. no n/v/d/abd pain. Tolerating po. hadn't been out of bed yet.     10/28/21- tele- remains in afib rate is controlled in the 80's. C/o hip pain and waiting for PT to get up    Review of system- All 10 systems reviewed and is as per HPI otherwise negative.     Vital Signs Last 24 Hrs  T(C): 36.5 (28 Oct 2021 12:30), Max: 37.1 (28 Oct 2021 11:59)  T(F): 97.7 (28 Oct 2021 12:30), Max: 98.8 (28 Oct 2021 11:59)  HR: 87 (28 Oct 2021 12:30) (74 - 90)  BP: 107/55 (28 Oct 2021 12:30) (91/56 - 136/59)  BP(mean): --  RR: 18 (28 Oct 2021 12:30) (18 - 19)  SpO2: 98% (28 Oct 2021 12:30) (98% - 100%)    PHYSICAL EXAM:  GENERAL: Comfortable, no acute distress  HEAD:  Atraumatic, Normocephalic  EYES: EOMI, PERRLA  HEENT: Moist mucous membranes  NECK: Supple, No JVD  NERVOUS SYSTEM:  Alert & Oriented X3, Motor Strength 5/5 B/L upper and lower extremities  CHEST/LUNG: Clear to auscultation bilaterally  HEART: irregularly irregular.   ABDOMEN: Soft, Nontender, Nondistended; Bowel sounds present  GENITOURINARY- Voiding, no palpable bladder  EXTREMITIES:  No clubbing, cyanosis, or edema  MUSCULOSKELETAL- LEft hip dressing intact, no drainage. +underlying hematoma.  SKIN-no rash    LABS:                        8.4    10.74 )-----------( 120      ( 28 Oct 2021 08:24 )             26.8     28 Oct 2021 08:24    132    |  101    |  49     ----------------------------<  111    5.2     |  26     |  1.42     Ca    8.7        28 Oct 2021 08:24    PT/INR - ( 28 Oct 2021 10:50 )   PT: 18.5 sec;   INR: 1.63 ratio      Urinalysis Basic - ( 26 Oct 2021 18:15 )  Color: Yellow / Appearance: Slightly Turbid / S.025 / pH: x  Gluc: x / Ketone: Trace  / Bili: Negative / Urobili: Negative mg/dL   Blood: x / Protein: 15 mg/dL / Nitrite: Negative   Leuk Esterase: Moderate / RBC: 0-2 /HPF / WBC 26-50   Sq Epi: x / Non Sq Epi: Moderate / Bacteria: Moderate    MEDICATIONS  (STANDING):  acetaminophen     Tablet .. 650 milliGRAM(s) Oral every 6 hours  aspirin  chewable 81 milliGRAM(s) Oral daily  atorvastatin 10 milliGRAM(s) Oral at bedtime  heparin   Injectable 5000 Unit(s) SubCutaneous every 8 hours  levothyroxine 100 MICROGram(s) Oral daily  melatonin 6 milliGRAM(s) Oral at bedtime  metoprolol tartrate 12.5 milliGRAM(s) Oral two times a day  midodrine. 2.5 milliGRAM(s) Oral three times a day  polyethylene glycol 3350 17 Gram(s) Oral daily  senna 2 Tablet(s) Oral at bedtime  warfarin 3 milliGRAM(s) Oral daily    MEDICATIONS  (PRN):  benzocaine 15 mG/menthol 3.6 mG (Sugar-Free) Lozenge 1 Lozenge Oral four times a day PRN Sore Throat  morphine  - Injectable 4 milliGRAM(s) IV Push every 4 hours PRN Severe Pain (7 - 10)  ondansetron   Disintegrating Tablet 4 milliGRAM(s) Oral three times a day PRN Nausea and/or Vomiting  traMADol 50 milliGRAM(s) Oral every 4 hours PRN Moderate Pain (4 - 6)  traMADol 100 milliGRAM(s) Oral every 4 hours PRN Severe Pain (7 - 10)    ASSESSMENT AND PLAN  89 y/o F with PMH A fib on Warfarin, HTN, HLD, hypothyroidism, CHFpEF, pleural effusions, severe pulmonary HTN, mild to moderate mitral regurgitation, severe tricuspid valve regurgitation presents for a fall A/W:    #Left intertrochanteric hip fracture secondary to mechanical fall   - S/P IM nail POD#2  - prn tramadol/tylenol for pain  - physical therapy  - incentive spirometry  - bowel regimen.     #Acute blood loss anemia:  -d/t surgery  -no need for transfusion at this time.     #Parox AFib   -rate better controlled on BB  -cardio eval  -AC by coumadin    #Hyponatremia  #Mild PRIYANKA  Likely due to dehydration/volume depletion  Slow IVF    #UTI  Start Ceftin 250mg PO BID x total 5 days    #HFpEF/valvular heart disease:  -stable.   -hold lasix/spironolactone today given low bp and mild PRIYANKA  -on midodrine     #HLD:  -statin.     #Hypothyroidism  -synthroid.     #DVT px:  hep sq and coumadin    #Dispo- likely [facility] in 1-2 days depending on the progress. D/w pt  
c/c: fall/left hip pain    HPI:  89 y/o F with PMH A fib on Warfarin, HTN, HLD, hypothyroidism, CHFpEF, pleural effusions, severe pulmonary HTN, mild to moderate mitral regurgitation, severe tricuspid valve regurgitation presents for a fall. The pt reports that her left leg gave out and she fell down onto the kitchen floor. She believes this occurred around 2 pm and her son did not come home and find her until 4 pm. She did not lose consciousness. Does report hitting her head on the floor. She is complaining of 10/10 pain located at the left hip described as an ache. She does use a scooter to at home adn does walk around on her own but will try to hold onto something. Does not walk very long distances. Denies chest pain or shortness of breath at rest or on exertion, fevers, chills, abdominal pain, N/V, diarrhea/constipation.     She was admitted with Left hip fracture.   Underwent IM nail 10/26.   Post op transferred to tele for rapid a fib.   required ivf for hypotension.     pt seen and examined this am. c/o pain left hip. no sob/chest pain. no n/v/d/abd pain. Tolerating po. hadn't been out of bed yet.     10/28/21- tele- remains in afib rate is controlled in the 80's. C/o hip pain and waiting for PT to get up  10/29/21- had nausea/vomiting all night, unable to take any pills. Refused to participate with PT today. Denies chest pain, no SOB    Review of system- All 10 systems reviewed and is as per HPI otherwise negative.     Vital Signs Last 24 Hrs  T(C): 36.4 (29 Oct 2021 09:24), Max: 36.4 (28 Oct 2021 20:36)  T(F): 97.5 (29 Oct 2021 09:24), Max: 97.5 (28 Oct 2021 20:36)  HR: 81 (29 Oct 2021 09:24) (69 - 81)  BP: 117/60 (29 Oct 2021 09:24) (111/68 - 134/75)  BP(mean): --  RR: 18 (29 Oct 2021 09:24) (18 - 18)  SpO2: 94% (29 Oct 2021 09:24) (94% - 96%)    PHYSICAL EXAM:  GENERAL: Comfortable, no acute distress  HEAD:  Atraumatic, Normocephalic  EYES: EOMI, PERRLA  HEENT: Moist mucous membranes  NECK: Supple, No JVD  NERVOUS SYSTEM:  Alert & Oriented X3, Motor Strength 5/5 B/L upper and lower extremities  CHEST/LUNG: Clear to auscultation bilaterally  HEART: irregularly irregular.   ABDOMEN: Soft, Nontender, Nondistended; Bowel sounds present  GENITOURINARY- Voiding, no palpable bladder  EXTREMITIES:  No clubbing, cyanosis, or edema  MUSCULOSKELETAL- LEft hip dressing intact, no drainage. +underlying hematoma.  SKIN-no rash    LABS:                        9.8    11.35 )-----------( 132      ( 29 Oct 2021 09:19 )             31.0     29 Oct 2021 09:19    133    |  102    |  38     ----------------------------<  116    4.8     |  24     |  0.88     Ca    8.2        29 Oct 2021 09:19    PT/INR - ( 29 Oct 2021 09:19 )   PT: 33.6 sec;   INR: 3.03 ratio      MEDICATIONS  (STANDING):  acetaminophen     Tablet .. 650 milliGRAM(s) Oral every 6 hours  aspirin  chewable 81 milliGRAM(s) Oral daily  atorvastatin 10 milliGRAM(s) Oral at bedtime  cefTRIAXone Injectable.      furosemide   Injectable 20 milliGRAM(s) IV Push daily  levothyroxine 100 MICROGram(s) Oral daily  melatonin 6 milliGRAM(s) Oral at bedtime  metoprolol tartrate 12.5 milliGRAM(s) Oral two times a day  midodrine. 2.5 milliGRAM(s) Oral three times a day  pantoprazole  Injectable 40 milliGRAM(s) IV Push daily  polyethylene glycol 3350 17 Gram(s) Oral daily  senna 2 Tablet(s) Oral at bedtime    MEDICATIONS  (PRN):  benzocaine 15 mG/menthol 3.6 mG (Sugar-Free) Lozenge 1 Lozenge Oral four times a day PRN Sore Throat  morphine  - Injectable 4 milliGRAM(s) IV Push every 4 hours PRN Severe Pain (7 - 10)  ondansetron Injectable 4 milliGRAM(s) IV Push every 4 hours PRN Nausea and/or Vomiting  traMADol 50 milliGRAM(s) Oral every 4 hours PRN Moderate Pain (4 - 6)  traMADol 100 milliGRAM(s) Oral every 4 hours PRN Severe Pain (7 - 10)    ASSESSMENT AND PLAN  89 y/o F with PMH A fib on Warfarin, HTN, HLD, hypothyroidism, CHFpEF, pleural effusions, severe pulmonary HTN, mild to moderate mitral regurgitation, severe tricuspid valve regurgitation presents for a fall A/W:    #Left intertrochanteric hip fracture secondary to mechanical fall   - S/P IM nail POD#3  - prn tramadol/tylenol for pain  - physical therapy  - incentive spirometry  - bowel regimen.     #Acute blood loss anemia:  -d/t surgery  -no need for transfusion at this time.     #Nausea/vomiting  No bowel obstruction on CT done earlier today  Has not been taking narcotics at least >24h  Troponin borderline, likely demand ischemia due to rapid afib earlier  Start Protonix IV  Antiemetics prn  Avoid narcotics    #Acute on chronic diastolic CHF  #B/l pleural effusions  #Severe AS  Lasix IV x1 today then reassess the need for further   Cardio following    #Parox AFib   -rate better controlled on BB  -cardio eval  -AC by coumadin    #Hyponatremia  #Mild PRIYANKA  Likely due to dehydration/volume depletion  Resolved    #UTI  Switch to IV Rocephin as unable to tolerate PO    #HLD:  -statin.     #Hypothyroidism  -synthroid.     #DVT px:  hep sq and coumadin    #Dispo- encourage to mobilize with PT, likely [facility] Monday if clinically better. D/w pt and DR Ayala  
  HPI:  89 y/o F with PMH A fib on Warfarin, HTN, HLD, hypothyroidism, CHFpEF, pleural effusions, severe pulmonary HTN, mild to moderate mitral regurgitation, severe tricuspid valve regurgitation presents for a fall. The pt reports that her left leg gave out and she fell down onto the kitchen floor. She believes this occurred around 2 pm and her son did not come home and find her until 4 pm. She did not lose consciousness. Does report hitting her head on the floor. She is complaining of 10/10 pain located at the left hip described as an ache. She does use a scooter to at home adn does walk around on her own but will try to hold onto something. Does not walk very long distances. Denies chest pain or shortness of breath at rest or on exertion, fevers, chills, abdominal pain, N/V, diarrhea/constipation.     ER course: VSS. Labs: WBC 14.72, , INR 2.80, BUN 31, Glucose 149. EKG: Atrial fibrillation with HR 89 bpm, QTC prolonged to 498, LVH, T wave inversions in V5 and V6 are unchanged from prior EKG on 10/29/2020 (personally reviewed).     Imaging:   - XR left knee: joint space narrowing, sclerosis suggesting degenerative joint disease (personally reviewed).   - XR left femur: left femur fracture    - XR hip with pelvis: Left femur fracture .   - CXR: hyperinflated lung fields, flattened diaphragms, no consolidation, no effusion, no pneumothorax (personally reviewed).   - CT head: Increased parenchymal volume loss. No acute hemorrhage mass or mass effect.    Pt was given 500 ml bolus of NS, morphine, and zofran. She is being admitted to med/surg for further management.    (26 Oct 2021 00:48)      Patient is a 89y old  Female who presents with a chief complaint of Fall, left hip pain (26 Oct 2021 06:12)    Consulted by Dr. Pennington   for VTE prophylaxis, risk stratification, and anticoagulation management.    PAST MEDICAL & SURGICAL HISTORY:  AF (atrial fibrillation)  On Warfarin    HTN (hypertension)    Hyperlipidemia    Hypothyroid    Congestive heart failure  Preserved EF    Pleural effusion    Severe pulmonary hypertension    Moderate mitral regurgitation    Severe tricuspid regurgitation    S/P appendectomy    Interval history  10/26/21:Patient seen at bedside, discussed the anticoagulation, she is on Coumadin for Afib, patient's INR is  2.15, NPO for OR today, patient takes 2mg of Coumadin 5times a week.Denies any h/o bleeding   10/27/21:Patient seen at bedside, s/p Left hip IMN, patient is transfered to [address] [place] [address] due to RVR with rate of 100-150 , now the rate in 80's , discussed the resumption of Coumadin,  will give coumadin 5mg tonight INR 1.39  10/28/21: Patient seen at bedside, seemingly comfortable, stable. Hgb 8.4 from 8.7. INR 1.6 from 1.3. Only had one dose of 5mg warfarin last evening. She takes 1.5mg Tuesday/thursday and 2mg all other days. Reduced dose to 3mg for now with likely return to regular dosing thereafter.     CrCl:29.8    Caprini VTE Risk Score:CAPRINI SCORE  AGE RELATED RISK FACTORS                                                       MOBILITY RELATED FACTORS  [ ] Age 41-60 years                                            (1 Point)                  [ ] Bed rest /restricted mobility                             (1 Point)  [ ] Age: 61-74 years                                           (2 Points)                [ ] Plaster cast                                                   (2 Points)  [x ] Age= 75 years                                              (3 Points)                 [ ] Bed bound for more than 72 hours                   (2 Points)    DISEASE RELATED RISK FACTORS                                               GENDER SPECIFIC FACTORS  [ ] Edema in the lower extremities                       (1 Point)           [ ] Pregnancy                                                            (1 Point)  [ ] Varicose veins                                               (1 Point)                  [ ] Post-partum < 6 weeks                                      (1 Point)             [ ] BMI > 25 Kg/m2                                            (1 Point)                  [ ] Hormonal therapy or oral contraception       (1 Point)                 [ ] Sepsis (in the previous month)                        (1 Point)             [ ] History of pregnancy complications                (1Point)  [ ] Pneumonia or serious lung disease                                             [ ] Unexplained or recurrent  (=/>3), premature                                 (In the previous month)                               (1 Point)                birth with toxemia or growth-restricted infant (1 Point)  [ ] Abnormal pulmonary function test            (1 Point)                                   SURGERY RELATED RISK FACTORS  [ ] Acute myocardial infarction                       (1 Point)                  [ ]  Section                                         (1 Point)  [ ] Congestive heart failure (in the previous month) (1 Point)   [ ] Minor surgery   lasting <45 minutes       (1 Point)   [ ] Inflammatory bowel disease                             (1 Point)          [ ] Arthroscopic surgery                                  (2 Points)  [ ] Central venous access                                    (2 Points)            [ ] General surgery lasting >45 minutes      (2 Points)       [ ] Stroke (in the previous month)                  (5 Points)            [ ] Elective major lower extremity arthroplasty (5 Points)                                   [  ] Malignancy (present or past include skin melanoma                                          but exclude  basal skin cell)    (2 points)                                      TRAUMA RELATED RISK FACTORS                HEMATOLOGY RELATED FACTORS                                  [x ] Fracture of the hip, pelvis, or leg                       (5 Points)  [ ] Prior episodes of VTE                                     (3 Points)          [ ] Acute spinal cord injury (in the previous month)  (5 Points)  [ ] Positive family history for VTE                         (3 Points)       [ ] Paralysis (less than 1 month)                          (5 Points)  [ ] Prothrombin 93549 A                                      (3 Points)         [ ] Multiple Trauma (within 1month)                 (5Points)                                                                                                                                                                [ ] Factor V Leiden                                          (3 Points)                                OTHER RISK FACTORS                          [ ] Lupus anticoagulants                                     (3 Points)                       [ ] BMI > 40                          (1 Point)                                                         [ ] Anticardiolipin antibodies                                (3 Points)                   [ ] Smoking                              (1Point)                                                [ ] High homocysteine in the blood                      (3 Points)                [  ] Diabetes requiring insulin (1point)                         [ ] Other congenital or acquired thrombophilia       (3 Points)          [  ] Chemotherapy                   (1 Point)  [ ] Heparin induced thrombocytopenia                  (3 Points)             [  ] Blood Transfusion                (1 point)                                                                                                             Total Score [    8      ]                                                                                                                                                                                                                                                                                                                                                                                                                                       [id number] Score: 6    IMPROVE Bleeding Risk Score:4.5    Falls Risk:   High (x  )  Mod (  )  Low (  )      FAMILY HISTORY:  FH: lung cancer (Father)      Denies any personal or familial history of clotting or bleeding disorders.    Allergies    Bactrim (Unknown)    Intolerances        REVIEW OF SYSTEMS    (  )Fever	     (  )Constipation	(  )SOB				(  )Headache	(  )Dysuria  (  )Chills	     (  )Melena	(  )Dyspnea present on exertion	                    (  )Dizziness                    (  )Polyuria  (  )Nausea	     (  )Hematochezia	(  )Cough			                    (  )Syncope   	(  )Hematuria  (  )Vomiting    (  )Chest Pain	(  )Wheezing			(  )Weakness  (  )Diarrhea     (  )Palpitations	(  )Anorexia			( x )joint pain    All  other review of systems negative: Yes    Vital Signs Last 24 Hrs  T(C): 36.5 (10-28-21 @ 12:30), Max: 37.1 (10-28-21 @ 11:59)  T(F): 97.7 (10-28-21 @ 12:30), Max: 98.8 (10-28-21 @ 11:59)  HR: 87 (10-28-21 @ 12:30) (74 - 90)  BP: 107/55 (10-28-21 @ 12:30) (91/56 - 136/59)  BP(mean): --  RR: 18 (10-28-21 @ 12:30) (18 - 19)  SpO2: 98% (10-28-21 @ 12:30) (98% - 100%)    PHYSICAL EXAM:    Constitutional: Appears Well    Neurological: A& O x 3    Skin: Warm    Respiratory and Thorax: normal effort; Breath sounds: normal; No rales/wheezing/rhonchi  	  Cardiovascular: S1, S2, irregular, NMBR	    Gastrointestinal: BS + x 4Q, nontender	    Genitourinary:  Bladder nondistended, nontender    Musculoskeletal:   General Right:   no muscle/joint tenderness,   normal tone, no joint swelling,   ROM: limited	    General Left:   + muscle/joint tenderness,   normal tone, no joint swelling,   ROM: limited    Hip:  Left: Dressing CDI    Lower extrems:   Right: no calf tenderness              negative champ's sign               + pedal pulses    Left:   no calf tenderness              negative champ's sign               + pedal pulses                        8.4    10.74 )-----------( 120      ( 28 Oct 2021 08:24 )             26.8       10-    132<L>  |  101  |  49<H>  ----------------------------<  111<H>  5.2   |  26  |  1.42<H>    Ca    8.7      28 Oct 2021 08:24        PT/INR - ( 28 Oct 2021 10:50 )   PT: 18.5 sec;   INR: 1.63 ratio                                 8.7    8.09  )-----------( 101      ( 27 Oct 2021 08:23 )             27.4       10    138  |  106  |  31<H>  ----------------------------<  134<H>  5.0   |  25  |  1.08    Ca    8.6      27 Oct 2021 08:23    TPro  x   /  Alb  3.3  /  TBili  x   /  DBili  x   /  AST  x   /  ALT  x   /  AlkPhos  x   10      PT/INR - ( 27 Oct 2021 10:45 )   PT: 16.0 sec;   INR: 1.39 ratio         PTT - ( 26 Oct 2021 07:55 )  PTT:35.6 sec                        9.2    6.90  )-----------( 121      ( 26 Oct 2021 07:55 )             29.4       10-26    141  |  108  |  28<H>  ----------------------------<  109<H>  4.5   |  28  |  0.97    Ca    8.8      26 Oct 2021 07:55    TPro  x   /  Alb  3.3  /  TBili  x   /  DBili  x   /  AST  x   /  ALT  x   /  AlkPhos  x   10-26      PT/INR - ( 26 Oct 2021 13:57 )   PT: 24.1 sec;   INR: 2.15 ratio         PTT - ( 26 Oct 2021 07:55 )  PTT:35.6 sec		    < from: CT Head No Cont (10.25.21 @ 20:49) >  IMPRESSION: Increased parenchymal volume loss.    No acute hemorrhage mass or mass effect.    < end of copied text >  		    MEDICATIONS  (STANDING):  acetaminophen     Tablet .. 650 milliGRAM(s) Oral every 6 hours  aspirin  chewable 81 milliGRAM(s) Oral daily  atorvastatin 10 milliGRAM(s) Oral at bedtime  heparin   Injectable 5000 Unit(s) SubCutaneous every 8 hours  levothyroxine 100 MICROGram(s) Oral daily  melatonin 6 milliGRAM(s) Oral at bedtime  metoprolol tartrate 12.5 milliGRAM(s) Oral two times a day  midodrine. 2.5 milliGRAM(s) Oral three times a day  polyethylene glycol 3350 17 Gram(s) Oral daily  senna 2 Tablet(s) Oral at bedtime  warfarin 3 milliGRAM(s) Oral daily    DVT Prophylaxis:  LMWH                   (  )  Heparin SQ           (  )  Coumadin             ( H )  Xarelto                  (  )  Eliquis                   (  )  Venodynes           ( x )  Ambulation          ( x )  UFH                       (  )  Contraindicated  (  )  EC ASPIRIN       (  )          
  HPI:  89 y/o F with PMH A fib on Warfarin, HTN, HLD, hypothyroidism, CHFpEF, pleural effusions, severe pulmonary HTN, mild to moderate mitral regurgitation, severe tricuspid valve regurgitation presents for a fall. The pt reports that her left leg gave out and she fell down onto the kitchen floor. She believes this occurred around 2 pm and her son did not come home and find her until 4 pm. She did not lose consciousness. Does report hitting her head on the floor. She is complaining of 10/10 pain located at the left hip described as an ache. She does use a scooter to at home adn does walk around on her own but will try to hold onto something. Does not walk very long distances. Denies chest pain or shortness of breath at rest or on exertion, fevers, chills, abdominal pain, N/V, diarrhea/constipation.     ER course: VSS. Labs: WBC 14.72, , INR 2.80, BUN 31, Glucose 149. EKG: Atrial fibrillation with HR 89 bpm, QTC prolonged to 498, LVH, T wave inversions in V5 and V6 are unchanged from prior EKG on 10/29/2020 (personally reviewed).     Imaging:   - XR left knee: joint space narrowing, sclerosis suggesting degenerative joint disease (personally reviewed).   - XR left femur: left femur fracture    - XR hip with pelvis: Left femur fracture .   - CXR: hyperinflated lung fields, flattened diaphragms, no consolidation, no effusion, no pneumothorax (personally reviewed).   - CT head: Increased parenchymal volume loss. No acute hemorrhage mass or mass effect.    Pt was given 500 ml bolus of NS, morphine, and zofran. She is being admitted to med/surg for further management.    (26 Oct 2021 00:48)      Patient is a 89y old  Female who presents with a chief complaint of Fall, left hip pain (26 Oct 2021 06:12)      Consulted by Dr. Pennington   for VTE prophylaxis, risk stratification, and anticoagulation management.    PAST MEDICAL & SURGICAL HISTORY:  AF (atrial fibrillation)  On Warfarin    HTN (hypertension)    Hyperlipidemia    Hypothyroid    Congestive heart failure  Preserved EF    Pleural effusion    Severe pulmonary hypertension    Moderate mitral regurgitation    Severe tricuspid regurgitation    S/P appendectomy    Interval history  10/26/21:Patient seen at bedside, discussed the anticoagulation, she is on Coumadin for Afib, patient's INR is  2.15, NPO for OR today, patient takes 2mg of Coumadin 5times a week.Denies any h/o bleeding   10/27/21:Patient seen at bedside, s/p Left hip IMN, patient is transfered to [address] [place] [address] due to RVR with rate of 100-150 , now the rate in 80's , discussed the resumption of Coumadin,  will give coumadin 5mg tonight INR 1.39,           CrCl:29.8      Caprini VTE Risk Score:CAPRINI SCORE  AGE RELATED RISK FACTORS                                                       MOBILITY RELATED FACTORS  [ ] Age 41-60 years                                            (1 Point)                  [ ] Bed rest /restricted mobility                             (1 Point)  [ ] Age: 61-74 years                                           (2 Points)                [ ] Plaster cast                                                   (2 Points)  [x ] Age= 75 years                                              (3 Points)                 [ ] Bed bound for more than 72 hours                   (2 Points)    DISEASE RELATED RISK FACTORS                                               GENDER SPECIFIC FACTORS  [ ] Edema in the lower extremities                       (1 Point)           [ ] Pregnancy                                                            (1 Point)  [ ] Varicose veins                                               (1 Point)                  [ ] Post-partum < 6 weeks                                      (1 Point)             [ ] BMI > 25 Kg/m2                                            (1 Point)                  [ ] Hormonal therapy or oral contraception       (1 Point)                 [ ] Sepsis (in the previous month)                        (1 Point)             [ ] History of pregnancy complications                (1Point)  [ ] Pneumonia or serious lung disease                                             [ ] Unexplained or recurrent  (=/>3), premature                                 (In the previous month)                               (1 Point)                birth with toxemia or growth-restricted infant (1 Point)  [ ] Abnormal pulmonary function test            (1 Point)                                   SURGERY RELATED RISK FACTORS  [ ] Acute myocardial infarction                       (1 Point)                  [ ]  Section                                         (1 Point)  [ ] Congestive heart failure (in the previous month) (1 Point)   [ ] Minor surgery   lasting <45 minutes       (1 Point)   [ ] Inflammatory bowel disease                             (1 Point)          [ ] Arthroscopic surgery                                  (2 Points)  [ ] Central venous access                                    (2 Points)            [ ] General surgery lasting >45 minutes      (2 Points)       [ ] Stroke (in the previous month)                  (5 Points)            [ ] Elective major lower extremity arthroplasty (5 Points)                                   [  ] Malignancy (present or past include skin melanoma                                          but exclude  basal skin cell)    (2 points)                                      TRAUMA RELATED RISK FACTORS                HEMATOLOGY RELATED FACTORS                                  [x ] Fracture of the hip, pelvis, or leg                       (5 Points)  [ ] Prior episodes of VTE                                     (3 Points)          [ ] Acute spinal cord injury (in the previous month)  (5 Points)  [ ] Positive family history for VTE                         (3 Points)       [ ] Paralysis (less than 1 month)                          (5 Points)  [ ] Prothrombin 08434 A                                      (3 Points)         [ ] Multiple Trauma (within 1month)                 (5Points)                                                                                                                                                                [ ] Factor V Leiden                                          (3 Points)                                OTHER RISK FACTORS                          [ ] Lupus anticoagulants                                     (3 Points)                       [ ] BMI > 40                          (1 Point)                                                         [ ] Anticardiolipin antibodies                                (3 Points)                   [ ] Smoking                              (1Point)                                                [ ] High homocysteine in the blood                      (3 Points)                [  ] Diabetes requiring insulin (1point)                         [ ] Other congenital or acquired thrombophilia       (3 Points)          [  ] Chemotherapy                   (1 Point)  [ ] Heparin induced thrombocytopenia                  (3 Points)             [  ] Blood Transfusion                (1 point)                                                                                                             Total Score [    8      ]                                                                                                                                                                                                                                                                                                                                                                                                                                       [id number] Score: 6    IMPROVE Bleeding Risk Score:4.5    Torniq time:     Time In:   Time Out:     Falls Risk:   High (x  )  Mod (  )  Low (  )      FAMILY HISTORY:  FH: lung cancer (Father)      Denies any personal or familial history of clotting or bleeding disorders.    Allergies    Bactrim (Unknown)    Intolerances        REVIEW OF SYSTEMS    (  )Fever	     (  )Constipation	(  )SOB				(  )Headache	(  )Dysuria  (  )Chills	     (  )Melena	(  )Dyspnea present on exertion	                    (  )Dizziness                    (  )Polyuria  (  )Nausea	     (  )Hematochezia	(  )Cough			                    (  )Syncope   	(  )Hematuria  (  )Vomiting    (  )Chest Pain	(  )Wheezing			(  )Weakness  (  )Diarrhea     (  )Palpitations	(  )Anorexia			( x )joint pain    All  other review of systems negative: Yes    Vital Signs Last 24 Hrs  T(C): 36.6 (27 Oct 2021 08:22), Max: 37.4 (26 Oct 2021 22:15)  T(F): 97.9 (27 Oct 2021 08:22), Max: 99.3 (26 Oct 2021 22:15)  HR: 92 (27 Oct 2021 08:22) (88 - 129)  BP: 107/58 (27 Oct 2021 08:22) (94/78 - 164/65)  BP(mean): --  RR: 18 (27 Oct 2021 08:22) (12 - 20)  SpO2: 100% (27 Oct 2021 08:22) (94% - 100%)    PHYSICAL EXAM:    Constitutional: Appears Well    Neurological: A& O x 3    Skin: Warm    Respiratory and Thorax: normal effort; Breath sounds: normal; No rales/wheezing/rhonchi  	  Cardiovascular: S1, S2, irregular, NMBR	    Gastrointestinal: BS + x 4Q, nontender	    Genitourinary:  Bladder nondistended, nontender    Musculoskeletal:   General Right:   no muscle/joint tenderness,   normal tone, no joint swelling,   ROM: limited	    General Left:   + muscle/joint tenderness,   normal tone, no joint swelling,   ROM: limited    Hip:  Left: Dressing           Lower extrems:   Right: no calf tenderness              negative champ's sign               + pedal pulses    Left:   no calf tenderness              negative champ's sign               + pedal pulses                        8.7    8.09  )-----------( 101      ( 27 Oct 2021 08:23 )             27.4       10    138  |  106  |  31<H>  ----------------------------<  134<H>  5.0   |  25  |  1.08    Ca    8.6      27 Oct 2021 08:23    TPro  x   /  Alb  3.3  /  TBili  x   /  DBili  x   /  AST  x   /  ALT  x   /  AlkPhos  x   10-26      PT/INR - ( 27 Oct 2021 10:45 )   PT: 16.0 sec;   INR: 1.39 ratio         PTT - ( 26 Oct 2021 07:55 )  PTT:35.6 sec                        9.2    6.90  )-----------( 121      ( 26 Oct 2021 07:55 )             29.4       10    141  |  108  |  28<H>  ----------------------------<  109<H>  4.5   |  28  |  0.97    Ca    8.8      26 Oct 2021 07:55    TPro  x   /  Alb  3.3  /  TBili  x   /  DBili  x   /  AST  x   /  ALT  x   /  AlkPhos  x   10-26      PT/INR - ( 26 Oct 2021 13:57 )   PT: 24.1 sec;   INR: 2.15 ratio         PTT - ( 26 Oct 2021 07:55 )  PTT:35.6 sec		    < from: CT Head No Cont (10.. @ 20:49) >  IMPRESSION: Increased parenchymal volume loss.    No acute hemorrhage mass or mass effect.    < end of copied text >  		    MEDICATIONS  (STANDING):  acetaminophen     Tablet .. 650 milliGRAM(s) Oral every 6 hours  aspirin  chewable 81 milliGRAM(s) Oral daily  atorvastatin 10 milliGRAM(s) Oral at bedtime  ceFAZolin   IVPB 2000 milliGRAM(s) IV Intermittent every 8 hours  heparin   Injectable 5000 Unit(s) SubCutaneous every 8 hours  lactated ringers. 1000 milliLiter(s) (75 mL/Hr) IV Continuous <Continuous>  lactated ringers. 1000 milliLiter(s) (75 mL/Hr) IV Continuous <Continuous>  levothyroxine 100 MICROGram(s) Oral daily  melatonin 6 milliGRAM(s) Oral at bedtime  midodrine. 2.5 milliGRAM(s) Oral three times a day  polyethylene glycol 3350 17 Gram(s) Oral daily  senna 2 Tablet(s) Oral at bedtime  sodium chloride 0.9%. 1000 milliLiter(s) (125 mL/Hr) IV Continuous <Continuous>  warfarin 5 milliGRAM(s) Oral once          DVT Prophylaxis:  LMWH                   (  )  Heparin SQ           (  )  Coumadin             ( H )  Xarelto                  (  )  Eliquis                   (  )  Venodynes           ( x )  Ambulation          ( x )  UFH                       (  )  Contraindicated  (  )  EC ASPIRIN       (  )          
  HPI:  89 y/o F with PMH A fib on Warfarin, HTN, HLD, hypothyroidism, CHFpEF, pleural effusions, severe pulmonary HTN, mild to moderate mitral regurgitation, severe tricuspid valve regurgitation presents for a fall. The pt reports that her left leg gave out and she fell down onto the kitchen floor. She believes this occurred around 2 pm and her son did not come home and find her until 4 pm. She did not lose consciousness. Does report hitting her head on the floor. She is complaining of 10/10 pain located at the left hip described as an ache. She does use a scooter to at home adn does walk around on her own but will try to hold onto something. Does not walk very long distances. Denies chest pain or shortness of breath at rest or on exertion, fevers, chills, abdominal pain, N/V, diarrhea/constipation.     ER course: VSS. Labs: WBC 14.72, , INR 2.80, BUN 31, Glucose 149. EKG: Atrial fibrillation with HR 89 bpm, QTC prolonged to 498, LVH, T wave inversions in V5 and V6 are unchanged from prior EKG on 10/29/2020 (personally reviewed).     Imaging:   - XR left knee: joint space narrowing, sclerosis suggesting degenerative joint disease (personally reviewed).   - XR left femur: left femur fracture    - XR hip with pelvis: Left femur fracture .   - CXR: hyperinflated lung fields, flattened diaphragms, no consolidation, no effusion, no pneumothorax (personally reviewed).   - CT head: Increased parenchymal volume loss. No acute hemorrhage mass or mass effect.    Pt was given 500 ml bolus of NS, morphine, and zofran. She is being admitted to med/surg for further management.    (26 Oct 2021 00:48)      Patient is a 89y old  Female who presents with a chief complaint of Fall, left hip pain (26 Oct 2021 06:12)    Consulted by Dr. Pennington   for VTE prophylaxis, risk stratification, and anticoagulation management.    PAST MEDICAL & SURGICAL HISTORY:  AF (atrial fibrillation)  On Warfarin    HTN (hypertension)    Hyperlipidemia    Hypothyroid    Congestive heart failure  Preserved EF    Pleural effusion    Severe pulmonary hypertension    Moderate mitral regurgitation    Severe tricuspid regurgitation    S/P appendectomy    Interval history  10/26/21:Patient seen at bedside, discussed the anticoagulation, she is on Coumadin for Afib, patient's INR is  2.15, NPO for OR today, patient takes 2mg of Coumadin 5times a week.Denies any h/o bleeding   10/27/21:Patient seen at bedside, s/p Left hip IMN, patient is transfered to [address] [place] [address] due to RVR with rate of 100-150 , now the rate in 80's , discussed the resumption of Coumadin,  will give coumadin 5mg tonight INR 1.39  10/28/21: Patient seen at bedside, seemingly comfortable, stable. Hgb 8.4 from 8.7. INR 1.6 from 1.3. Only had one dose of 5mg warfarin last evening. She takes 1.5mg Tuesday/thursday and 2mg all other days. Reduced dose to 3mg for now with likely return to regular dosing thereafter.   10- Pt seen at bedside on [address].  C/O nausea, denies abd pain states her left leg hurts when she move it.  Made her aware that we will hold her coumadin today.      CrCl:29.8    Caprini VTE Risk Score:CAPRINI SCORE  AGE RELATED RISK FACTORS                                                       MOBILITY RELATED FACTORS  [ ] Age 41-60 years                                            (1 Point)                  [ ] Bed rest /restricted mobility                             (1 Point)  [ ] Age: 61-74 years                                           (2 Points)                [ ] Plaster cast                                                   (2 Points)  [x ] Age= 75 years                                              (3 Points)                 [ ] Bed bound for more than 72 hours                   (2 Points)    DISEASE RELATED RISK FACTORS                                               GENDER SPECIFIC FACTORS  [ ] Edema in the lower extremities                       (1 Point)           [ ] Pregnancy                                                            (1 Point)  [ ] Varicose veins                                               (1 Point)                  [ ] Post-partum < 6 weeks                                      (1 Point)             [ ] BMI > 25 Kg/m2                                            (1 Point)                  [ ] Hormonal therapy or oral contraception       (1 Point)                 [ ] Sepsis (in the previous month)                        (1 Point)             [ ] History of pregnancy complications                (1Point)  [ ] Pneumonia or serious lung disease                                             [ ] Unexplained or recurrent  (=/>3), premature                                 (In the previous month)                               (1 Point)                birth with toxemia or growth-restricted infant (1 Point)  [ ] Abnormal pulmonary function test            (1 Point)                                   SURGERY RELATED RISK FACTORS  [ ] Acute myocardial infarction                       (1 Point)                  [ ]  Section                                         (1 Point)  [ ] Congestive heart failure (in the previous month) (1 Point)   [ ] Minor surgery   lasting <45 minutes       (1 Point)   [ ] Inflammatory bowel disease                             (1 Point)          [ ] Arthroscopic surgery                                  (2 Points)  [ ] Central venous access                                    (2 Points)            [ ] General surgery lasting >45 minutes      (2 Points)       [ ] Stroke (in the previous month)                  (5 Points)            [ ] Elective major lower extremity arthroplasty (5 Points)                                   [  ] Malignancy (present or past include skin melanoma                                          but exclude  basal skin cell)    (2 points)                                      TRAUMA RELATED RISK FACTORS                HEMATOLOGY RELATED FACTORS                                  [x ] Fracture of the hip, pelvis, or leg                       (5 Points)  [ ] Prior episodes of VTE                                     (3 Points)          [ ] Acute spinal cord injury (in the previous month)  (5 Points)  [ ] Positive family history for VTE                         (3 Points)       [ ] Paralysis (less than 1 month)                          (5 Points)  [ ] Prothrombin 52024 A                                      (3 Points)         [ ] Multiple Trauma (within 1month)                 (5Points)                                                                                                                                                                [ ] Factor V Leiden                                          (3 Points)                                OTHER RISK FACTORS                          [ ] Lupus anticoagulants                                     (3 Points)                       [ ] BMI > 40                          (1 Point)                                                         [ ] Anticardiolipin antibodies                                (3 Points)                   [ ] Smoking                              (1Point)                                                [ ] High homocysteine in the blood                      (3 Points)                [  ] Diabetes requiring insulin (1point)                         [ ] Other congenital or acquired thrombophilia       (3 Points)          [  ] Chemotherapy                   (1 Point)  [ ] Heparin induced thrombocytopenia                  (3 Points)             [  ] Blood Transfusion                (1 point)                                                                                                             Total Score [    8      ]                                                                                                                                                                                                                                                                                                                                                                                                                                       [id number] Score: 6    IMPROVE Bleeding Risk Score:4.5    Falls Risk:   High (x  )  Mod (  )  Low (  )      FAMILY HISTORY:  FH: lung cancer (Father)      Denies any personal or familial history of clotting or bleeding disorders.    Allergies    Bactrim (Unknown)    Intolerances        REVIEW OF SYSTEMS    (  )Fever	     (  )Constipation	(  )SOB				(  )Headache	(  )Dysuria  (  )Chills	     (  )Melena	(  )Dyspnea present on exertion	                    (  )Dizziness                    (  )Polyuria  ( X )Nausea	     (  )Hematochezia	(  )Cough			                    (  )Syncope   	(  )Hematuria  (  )Vomiting    (  )Chest Pain	(  )Wheezing			(  )Weakness  (  )Diarrhea     (  )Palpitations	(  )Anorexia			( x )joint pain    All  other review of systems negative: Yes    Vital Signs Last 24 Hrs  T(C): 36.4 (10-29-21 @ 09:24), Max: 36.4 (10-28-21 @ 15:15)  T(F): 97.5 (10-29-21 @ 09:24), Max: 97.6 (10-28-21 @ 15:15)  HR: 81 (10-29-21 @ 09:24) (69 - 81)  BP: 117/60 (10-29-21 @ 09:24) (111/68 - 134/75)  BP(mean): --  RR: 18 (10-29-21 @ 09:24) (18 - 18)  SpO2: 94% (10-29-21 @ 09:24) (94% - 98%)    PHYSICAL EXAM:    Constitutional: Appears Well    Neurological: A& O x 3    Skin: Warm    Respiratory and Thorax: normal effort; Breath sounds: normal; No rales/wheezing/rhonchi  	  Cardiovascular: S1, S2, irregular, NMBR	    Gastrointestinal: BS + x 4Q, nontender	    Genitourinary:  Bladder nondistended, nontender    Musculoskeletal:   General Right:   no muscle/joint tenderness,   normal tone, no joint swelling,   ROM: limited	    General Left:   + muscle/joint tenderness,   normal tone, no joint swelling,   ROM: limited    Hip:  Left: Dressing CDI    Lower extrems:   Right: no calf tenderness              negative champ's sign               + pedal pulses    Left:   no calf tenderness              negative champ's sign               + pedal pulses                                 9.8    11.35 )-----------( 132      ( 29 Oct 2021 09:19 )             31.0       10-29    133<L>  |  102  |  38<H>  ----------------------------<  116<H>  4.8   |  24  |  0.88    Ca    8.2<L>      29 Oct 2021 09:19                   8.4    10.74 )-----------( 120      ( 28 Oct 2021 08:24 ) 1 UNIT PRBC             26.8       10-28    132<L>  |  101  |  49<H>  ----------------------------<  111<H>  5.2   |  26  |  1.42<H>    Ca    8.7      28 Oct 2021 08:24                                     8.7    8.09  )-----------( 101      ( 27 Oct 2021 08:23 )             27.4       10    138  |  106  |  31<H>  ----------------------------<  134<H>  5.0   |  25  |  1.08    Ca    8.6      27 Oct 2021 08:23    TPro  x   /  Alb  3.3  /  TBili  x   /  DBili  x   /  AST  x   /  ALT  x   /  AlkPhos  x   10-26      PT/INR - ( 27 Oct 2021 10:45 )   PT: 16.0 sec;   INR: 1.39 ratio         PTT - ( 26 Oct 2021 07:55 )  PTT:35.6 sec                        9.2    6.90  )-----------( 121      ( 26 Oct 2021 07:55 )             29.4       10    141  |  108  |  28<H>  ----------------------------<  109<H>  4.5   |  28  |  0.97    Ca    8.8      26 Oct 2021 07:55    TPro  x   /  Alb  3.3  /  TBili  x   /  DBili  x   /  AST  x   /  ALT  x   /  AlkPhos  x   10-26    PT/INR - ( 29 Oct 2021 09:19 )   PT: 33.6 sec;   INR: 3.03 ratio   HOLD COUMADIN  PT/INR - ( 28 Oct 2021 10:50 )   PT: 18.5 sec;   INR: 1.63 ratio                                                      27 OCT 2021 INR: 1.39: ratio   PT/INR - ( 26 Oct 2021 13:57 )   PT: 24.1 sec;   INR: 2.15 ratio         PTT - ( 26 Oct 2021 07:55 )  PTT:35.6 sec		    < from: CT Head No Cont (10..21 @ 20:49) >  IMPRESSION: Increased parenchymal volume loss.    No acute hemorrhage mass or mass effect.    < end of copied text >  		    MEDICATIONS  (STANDING):  acetaminophen     Tablet .. 650 milliGRAM(s) Oral every 6 hours  aspirin  chewable 81 milliGRAM(s) Oral daily  atorvastatin 10 milliGRAM(s) Oral at bedtime  cefTRIAXone Injectable.      furosemide   Injectable 20 milliGRAM(s) IV Push daily  levothyroxine 100 MICROGram(s) Oral daily  melatonin 6 milliGRAM(s) Oral at bedtime  metoprolol tartrate 12.5 milliGRAM(s) Oral two times a day  midodrine. 2.5 milliGRAM(s) Oral three times a day  polyethylene glycol 3350 17 Gram(s) Oral daily  senna 2 Tablet(s) Oral at bedtime      DVT Prophylaxis:  LMWH                   (  )  Heparin SQ           (  )  Coumadin             ( H )  Xarelto                  (  )  Eliquis                   (  )  Venodynes           ( x )  Ambulation          ( x )  UFH                       (  )  Contraindicated  (  )  EC ASPIRIN       (  )          
Patient seen and examined at bedside, resting comfortably. Pain well controlled. Denies chest pain, numbness/tingling. No complaints at this time. ROS limited 2/2 pts mental status.     LABS:                        10.3   16.01 )-----------( 117      ( 26 Oct 2021 22:47 )             33.2     10-26    137  |  106  |  27<H>  ----------------------------<  113<H>  4.3   |  26  |  0.98    Ca    8.7      26 Oct 2021 22:47    TPro  x   /  Alb  3.3  /  TBili  x   /  DBili  x   /  AST  x   /  ALT  x   /  AlkPhos  x   10-26    PT/INR - ( 26 Oct 2021 20:02 )   PT: 19.0 sec;   INR: 1.68 ratio         PTT - ( 26 Oct 2021 07:55 )  PTT:35.6 sec  Urinalysis Basic - ( 26 Oct 2021 18:15 )    Color: Yellow / Appearance: Slightly Turbid / S.025 / pH: x  Gluc: x / Ketone: Trace  / Bili: Negative / Urobili: Negative mg/dL   Blood: x / Protein: 15 mg/dL / Nitrite: Negative   Leuk Esterase: Moderate / RBC: 0-2 /HPF / WBC 26-50   Sq Epi: x / Non Sq Epi: Moderate / Bacteria: Moderate          Vitals  T(C): 37.4 (10-26-21 @ 22:15), Max: 37.4 (10-26-21 @ 22:15)  HR: 126 (10-27-21 @ 00:15) (60 - 126)  BP: 125/66 (10-27-21 @ 00:15) (97/46 - 164/65)  RR: 14 (10-27-21 @ 00:15) (12 - 20)  SpO2: 98% (10-27-21 @ 00:15) (94% - 100%)    PHYSICAL EXAM  General: NAD, Awake, Dementia at baseline    LEFT Lower Extremity:  Dressing c/d/i  SCDs in place  L2-S1 SILT  +TA/EHL/FHL/GSC  + DP pulses  Compartments soft and compressible  Calves nontender      
Patient seen and examined at bedside, resting comfortably. Pain well controlled. Denies numbness/tingling. No complaints at this time. ROS limited 2/2 pts mental status.     Vital Signs Last 24 Hrs  T(C): 36.4 (27 Oct 2021 04:59), Max: 37.4 (26 Oct 2021 22:15)  T(F): 97.5 (27 Oct 2021 04:59), Max: 99.3 (26 Oct 2021 22:15)  HR: 106 (27 Oct 2021 04:59) (82 - 129)  BP: 116/61 (27 Oct 2021 04:59) (94/78 - 164/65)  RR: 18 (27 Oct 2021 04:59) (12 - 20)  SpO2: 99% (27 Oct 2021 04:59) (94% - 100%)    Labs:                        10.3   16.01 )-----------( 117      ( 26 Oct 2021 22:47 )             33.2       Auto Neutrophil %: 75.2 % (10-26-21 @ 07:55)  Auto Immature Granulocyte %: 0.4 % (10-26-21 @ 07:55)    10-26    137  |  106  |  27<H>  ----------------------------<  113<H>  4.3   |  26  |  0.98        PHYSICAL EXAM  General: NAD, Awake, Dementia at baseline  LEFT Lower Extremity:  Dressing mild spotting to superior dressing. distal dressing c/d/i  SCDs in place  L2-S1 SILT  +TA/EHL/FHL/GSC  + DP pulses  Compartments soft and compressible  Calves nontender      
Patient seen and examined at bedside, resting comfortably. Pain well controlled. Denies numbness/tingling. No complaints at this time. ROS limited 2/2 pts mental status.     Vital Signs Last 24 Hrs  T(C): 36.4 (28 Oct 2021 20:36), Max: 37.1 (28 Oct 2021 11:59)  T(F): 97.5 (28 Oct 2021 20:36), Max: 98.8 (28 Oct 2021 11:59)  HR: 69 (28 Oct 2021 20:36) (69 - 90)  BP: 111/68 (28 Oct 2021 20:36) (91/56 - 136/59)  RR: 18 (28 Oct 2021 20:36) (18 - 18)  SpO2: 96% (28 Oct 2021 20:36) (96% - 100%)    Labs:               8.4    10.74 )-----------( 120      ( 28 Oct 2021 08:24 )             26.8         10-28    132<L>  |  101  |  49<H>  ----------------------------<  111<H>  5.2   |  26  |  1.42<H>          PHYSICAL EXAM  General: NAD, Awake, Dementia at baseline  LEFT Lower Extremity:  Dressing mild spotting to superior dressing. distal dressing c/d/i  SCDs in place  L2-S1 SILT  +TA/EHL/FHL/GSC  + DP pulses  Compartments soft and compressible  Calves nontender      
Patient seen and examined at bedside, resting comfortably. Pain well controlled. Denies numbness/tingling. No complaints at this time. ROS limited 2/2 pts mental status.     Vital Signs Last 24 Hrs  T(C): 37 (27 Oct 2021 23:32), Max: 37 (27 Oct 2021 23:32)  T(F): 98.6 (27 Oct 2021 23:32), Max: 98.6 (27 Oct 2021 23:32)  HR: 77 (27 Oct 2021 23:32) (77 - 92)  BP: 99/50 (27 Oct 2021 23:32) (99/50 - 107/58)  RR: 18 (27 Oct 2021 23:32) (18 - 19)  SpO2: 100% (27 Oct 2021 23:32) (98% - 100%)    Labs:                 8.7    8.09  )-----------( 101      ( 27 Oct 2021 08:23 )             27.4         10-27    138  |  106  |  31<H>  ----------------------------<  134<H>  5.0   |  25  |  1.08      PHYSICAL EXAM  General: NAD, Awake, Dementia at baseline  LEFT Lower Extremity:  Dressing mild spotting to superior dressing. distal dressing c/d/i  SCDs in place  L2-S1 SILT  +TA/EHL/FHL/GSC  + DP pulses  Compartments soft and compressible  Calves nontender      
Patient seen and examined at bedside, resting comfortably. Pain well controlled. Denies numbness/tingling. ROS limited 2/2 pts mental status.     Vital Signs Last 24 Hrs  T(C): 36.6 (30 Oct 2021 05:34), Max: 36.6 (30 Oct 2021 05:34)  T(F): 97.9 (30 Oct 2021 05:34), Max: 97.9 (30 Oct 2021 05:34)  HR: 66 (30 Oct 2021 05:34) (66 - 82)  BP: 111/51 (30 Oct 2021 05:34) (104/57 - 127/60)  BP(mean): 68 (30 Oct 2021 05:34) (68 - 71)  RR: 18 (30 Oct 2021 05:34) (18 - 19)  SpO2: 94% (30 Oct 2021 05:34) (94% - 98%)    Labs:                9.8    11.35 )-----------( 132      ( 29 Oct 2021 09:19 )             31.0         10-29    133<L>  |  102  |  38<H>  ----------------------------<  116<H>  4.8   |  24  |  0.88        PHYSICAL EXAM  General: NAD, Awake, Dementia at baseline   LEFT Lower Extremity:  Dressing mild spotting to superior dressing. distal dressing c/d/i  SCDs in place  L2-S1 SILT  +TA/EHL/FHL/GSC  + DP pulses  Compartments soft and compressible  Calves nontender

## 2021-11-01 NOTE — DISCHARGE NOTE NURSING/CASE MANAGEMENT/SOCIAL WORK - PATIENT PORTAL LINK FT
You can access the FollowMyHealth Patient Portal offered by [facility] by registering at the following website: [email or web address]. By joining [facility]’s FollowMyHealth portal, you will also be able to view your health information using other applications (apps) compatible with our system.

## 2021-11-05 DIAGNOSIS — Z98.890 OTHER SPECIFIED POSTPROCEDURAL STATES: ICD-10-CM

## 2021-11-05 DIAGNOSIS — N18.9 CHRONIC KIDNEY DISEASE, UNSPECIFIED: ICD-10-CM
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
## 2021-11-05 DIAGNOSIS — I87.2 VENOUS INSUFFICIENCY (CHRONIC) (PERIPHERAL): ICD-10-CM

## 2021-11-05 DIAGNOSIS — N17.9 ACUTE KIDNEY FAILURE, UNSPECIFIED: ICD-10-CM

## 2021-11-05 DIAGNOSIS — I48.0 PAROXYSMAL ATRIAL FIBRILLATION: ICD-10-CM

## 2021-11-05 DIAGNOSIS — I50.33 ACUTE ON CHRONIC DIASTOLIC (CONGESTIVE) HEART FAILURE: ICD-10-CM

## 2021-11-05 DIAGNOSIS — D69.6 THROMBOCYTOPENIA, UNSPECIFIED: ICD-10-CM

## 2021-11-05 DIAGNOSIS — E87.5 HYPERKALEMIA: ICD-10-CM

## 2021-11-05 DIAGNOSIS — R00.0 TACHYCARDIA, UNSPECIFIED: ICD-10-CM

## 2021-11-05 DIAGNOSIS — I27.20 PULMONARY HYPERTENSION, UNSPECIFIED: ICD-10-CM

## 2021-11-05 DIAGNOSIS — E87.1 HYPO-OSMOLALITY AND HYPONATREMIA: ICD-10-CM

## 2021-11-05 DIAGNOSIS — S72.142A DISPLACED INTERTROCHANTERIC FRACTURE OF LEFT FEMUR, INITIAL ENCOUNTER FOR CLOSED FRACTURE: ICD-10-CM

## 2021-11-05 DIAGNOSIS — N39.0 URINARY TRACT INFECTION, SITE NOT SPECIFIED: ICD-10-CM

## 2021-11-05 DIAGNOSIS — D62 ACUTE POSTHEMORRHAGIC ANEMIA: ICD-10-CM

## 2021-11-05 DIAGNOSIS — E03.9 HYPOTHYROIDISM, UNSPECIFIED: ICD-10-CM

## 2021-11-05 DIAGNOSIS — I13.0 HYPERTENSIVE HEART AND CHRONIC KIDNEY DISEASE WITH HEART FAILURE AND STAGE 1 THROUGH STAGE 4 CHRONIC KIDNEY DISEASE, OR UNSPECIFIED CHRONIC KIDNEY DISEASE: ICD-10-CM

## 2021-11-05 DIAGNOSIS — I08.1 RHEUMATIC DISORDERS OF BOTH MITRAL AND TRICUSPID VALVES: ICD-10-CM
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
## 2021-11-05 DIAGNOSIS — F03.90 UNSPECIFIED DEMENTIA WITHOUT BEHAVIORAL DISTURBANCE: ICD-10-CM

## 2021-11-05 DIAGNOSIS — Z79.01 LONG TERM (CURRENT) USE OF ANTICOAGULANTS: ICD-10-CM
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
## 2021-11-16 PROBLEM — I50.9 HEART FAILURE, UNSPECIFIED: Chronic | Status: ACTIVE | Noted: 2021-10-26

## 2021-11-16 PROBLEM — I27.20 PULMONARY HYPERTENSION, UNSPECIFIED: Chronic | Status: ACTIVE | Noted: 2021-10-26

## 2021-11-16 PROBLEM — I34.0 NONRHEUMATIC MITRAL (VALVE) INSUFFICIENCY: Chronic | Status: ACTIVE | Noted: 2021-10-26

## 2021-11-16 PROBLEM — I07.1 RHEUMATIC TRICUSPID INSUFFICIENCY: Chronic | Status: ACTIVE | Noted: 2021-10-26

## 2021-11-16 PROBLEM — J90 PLEURAL EFFUSION, NOT ELSEWHERE CLASSIFIED: Chronic | Status: ACTIVE | Noted: 2021-10-26

## 2021-11-19 ENCOUNTER — APPOINTMENT (OUTPATIENT)
Dept: ORTHOPEDIC SURGERY | Facility: CLINIC | Age: 86
End: 2021-11-19
Payer: MEDICARE

## 2021-11-19 DIAGNOSIS — S72.142D DISPLACED INTERTROCHANTERIC FRACTURE OF LEFT FEMUR, SUBSEQUENT ENCOUNTER FOR CLOSED FRACTURE WITH ROUTINE HEALING: ICD-10-CM

## 2021-11-19 PROCEDURE — 73502 X-RAY EXAM HIP UNI 2-3 VIEWS: CPT | Mod: LT

## 2021-11-19 PROCEDURE — 99024 POSTOP FOLLOW-UP VISIT: CPT

## 2021-11-19 NOTE — ADDENDUM
[FreeTextEntry1] : I, Richelle Rios, acted solely as a scribe for Dr. Nic Pennington on this date 11/19/2021.\par \par All medical record entries made by the Scribe were at my, Dr. Nic Pennington, direction and personally dictated by me on 11/19/2021 . I have reviewed the chart and agree that the record accurately reflects my personal performance of the history, physical exam, assessment and plan. I have also personally directed, reviewed, and agreed with the chart.	\par

## 2021-11-19 NOTE — HISTORY OF PRESENT ILLNESS
[___ Weeks Post Op] : [unfilled] weeks post op [Swelling] : swollen [Doing Well] : is doing well [No Sign of Infection] : is showing no signs of infection [Sutures Removed] : sutures were removed [Adequate Pain Control] : has adequate pain control [Staples Removed] : staples were removed [Chills] : no chills [Fever] : no fever [Nausea] : no nausea [Vomiting] : no vomiting [Healed] : not healed [Erythema] : not erythematous [Discharge] : absent of discharge [Dehiscence] : not dehisced [de-identified] : s/p ORIF with cephalomedullary nail, left intertrochanteric hip fracture\par DOS: 10/26/2021 [de-identified] : 89 year old female presenting in the office 3 weeks post op from [facility] with cephalomedullary nail, left intertrochanteric hip fracture. The patient is currently a patient at [facility]. The patient's pain is noted to be a 4/10. The patient has been attending physical therapy for this issue. The patient presents NWB, ambulating in a wheelchair. No other complaints at this time. [de-identified] : General: Alert and oriented x3. In no acute distress. Pleasant in nature with a normal affect. No apparent respiratory distress.\par The wound is intact. no evidence of any diastases or dehiscence. No surrounding erythema noted. No fluctuance. The area is warm and well perfused. The patient is able to wiggle their toes. Neurovascularly intact.\par \par Sutures/Staples were removed. 		 [de-identified] : 2V of the left hip were ordered, obtained, and reviewed by me today, 11/19/2021, revealed: Hardware intact. Fracture line is stable, with healing.  [de-identified] : 89 year old female present in the office 3 weeks post op from [facility]IF with cephalomedullary nail, left intertrochanteric hip fracture [de-identified] : 89 year old female present in the office 3 weeks post op from [facility] with cephalomedullary nail, left intertrochanteric hip fracture.\par \par XR imaging was completed in office today and results were reviewed with the patient and their family member. Sutures/Staples were previously removed at an outside facility. Further, I recommend the patient continue to undergo a course of physical therapy for the left hip  2-3 times a week for a total of 6-8 weeks.	\par \par I have addressed all the patient's concerns surrounding the pathology of their condition. The patient understood and verbally agreed to the treatment plan. All of their questions were answered and they were satisfied with the visit. The patient should call the office if they have any questions or experience worsening symptoms.		\par \par Follow up PRN for re-evaluation.

## 2022-07-29 ENCOUNTER — TRANSCRIPTION ENCOUNTER (OUTPATIENT)
Age: 87
End: 2022-07-29

## 2022-07-29 ENCOUNTER — INPATIENT (INPATIENT)
Facility: HOSPITAL | Age: 87
LOS: 0 days | Discharge: HOSPICE | DRG: 951 | End: 2022-07-29
Attending: FAMILY MEDICINE | Admitting: INTERNAL MEDICINE
Payer: MEDICARE

## 2022-07-29 VITALS
DIASTOLIC BLOOD PRESSURE: 63 MMHG | RESPIRATION RATE: 18 BRPM | SYSTOLIC BLOOD PRESSURE: 112 MMHG | HEART RATE: 103 BPM | OXYGEN SATURATION: 94 %

## 2022-07-29 VITALS
RESPIRATION RATE: 18 BRPM | TEMPERATURE: 98 F | OXYGEN SATURATION: 91 % | HEIGHT: 72 IN | SYSTOLIC BLOOD PRESSURE: 139 MMHG | DIASTOLIC BLOOD PRESSURE: 80 MMHG | WEIGHT: 110.01 LBS | HEART RATE: 84 BPM

## 2022-07-29 DIAGNOSIS — G91.9 HYDROCEPHALUS, UNSPECIFIED: ICD-10-CM

## 2022-07-29 DIAGNOSIS — I61.5 NONTRAUMATIC INTRACEREBRAL HEMORRHAGE, INTRAVENTRICULAR: ICD-10-CM

## 2022-07-29 DIAGNOSIS — R79.1 ABNORMAL COAGULATION PROFILE: ICD-10-CM

## 2022-07-29 DIAGNOSIS — Z90.49 ACQUIRED ABSENCE OF OTHER SPECIFIED PARTS OF DIGESTIVE TRACT: Chronic | ICD-10-CM

## 2022-07-29 DIAGNOSIS — I60.9 NONTRAUMATIC SUBARACHNOID HEMORRHAGE, UNSPECIFIED: ICD-10-CM

## 2022-07-29 DIAGNOSIS — I61.9 NONTRAUMATIC INTRACEREBRAL HEMORRHAGE, UNSPECIFIED: ICD-10-CM

## 2022-07-29 LAB
ALBUMIN SERPL ELPH-MCNC: 3.8 G/DL — SIGNIFICANT CHANGE UP (ref 3.3–5)
ALP SERPL-CCNC: 98 U/L — SIGNIFICANT CHANGE UP (ref 40–120)
ALT FLD-CCNC: 18 U/L — SIGNIFICANT CHANGE UP (ref 12–78)
ANION GAP SERPL CALC-SCNC: 9 MMOL/L — SIGNIFICANT CHANGE UP (ref 5–17)
APPEARANCE UR: ABNORMAL
APTT BLD: 32 SEC — SIGNIFICANT CHANGE UP (ref 27.5–35.5)
APTT BLD: 41 SEC — HIGH (ref 27.5–35.5)
AST SERPL-CCNC: 23 U/L — SIGNIFICANT CHANGE UP (ref 15–37)
BASOPHILS # BLD AUTO: 0.03 K/UL — SIGNIFICANT CHANGE UP (ref 0–0.2)
BASOPHILS # BLD AUTO: 0.05 K/UL — SIGNIFICANT CHANGE UP (ref 0–0.2)
BASOPHILS NFR BLD AUTO: 0.3 % — SIGNIFICANT CHANGE UP (ref 0–2)
BASOPHILS NFR BLD AUTO: 0.5 % — SIGNIFICANT CHANGE UP (ref 0–2)
BILIRUB SERPL-MCNC: 1.7 MG/DL — HIGH (ref 0.2–1.2)
BILIRUB UR-MCNC: NEGATIVE — SIGNIFICANT CHANGE UP
BUN SERPL-MCNC: 31 MG/DL — HIGH (ref 7–23)
CALCIUM SERPL-MCNC: 9.4 MG/DL — SIGNIFICANT CHANGE UP (ref 8.5–10.1)
CHLORIDE SERPL-SCNC: 107 MMOL/L — SIGNIFICANT CHANGE UP (ref 96–108)
CO2 SERPL-SCNC: 25 MMOL/L — SIGNIFICANT CHANGE UP (ref 22–31)
COLOR SPEC: YELLOW — SIGNIFICANT CHANGE UP
CREAT SERPL-MCNC: 1.33 MG/DL — HIGH (ref 0.5–1.3)
DIFF PNL FLD: ABNORMAL
EGFR: 38 ML/MIN/1.73M2 — LOW
EOSINOPHIL # BLD AUTO: 0 K/UL — SIGNIFICANT CHANGE UP (ref 0–0.5)
EOSINOPHIL # BLD AUTO: 0.02 K/UL — SIGNIFICANT CHANGE UP (ref 0–0.5)
EOSINOPHIL NFR BLD AUTO: 0 % — SIGNIFICANT CHANGE UP (ref 0–6)
EOSINOPHIL NFR BLD AUTO: 0.2 % — SIGNIFICANT CHANGE UP (ref 0–6)
FLUAV AG NPH QL: SIGNIFICANT CHANGE UP
FLUBV AG NPH QL: SIGNIFICANT CHANGE UP
GLUCOSE SERPL-MCNC: 163 MG/DL — HIGH (ref 70–99)
GLUCOSE UR QL: NEGATIVE — SIGNIFICANT CHANGE UP
HCT VFR BLD CALC: 35.9 % — SIGNIFICANT CHANGE UP (ref 34.5–45)
HCT VFR BLD CALC: 37 % — SIGNIFICANT CHANGE UP (ref 34.5–45)
HGB BLD-MCNC: 10.9 G/DL — LOW (ref 11.5–15.5)
HGB BLD-MCNC: 11.6 G/DL — SIGNIFICANT CHANGE UP (ref 11.5–15.5)
IMM GRANULOCYTES NFR BLD AUTO: 0.5 % — SIGNIFICANT CHANGE UP (ref 0–1.5)
IMM GRANULOCYTES NFR BLD AUTO: 0.7 % — SIGNIFICANT CHANGE UP (ref 0–1.5)
INR BLD: 1.39 RATIO — HIGH (ref 0.88–1.16)
INR BLD: 3.83 RATIO — HIGH (ref 0.88–1.16)
INR BLD: 4.53 RATIO — HIGH (ref 0.88–1.16)
KETONES UR-MCNC: NEGATIVE — SIGNIFICANT CHANGE UP
LEUKOCYTE ESTERASE UR-ACNC: ABNORMAL
LIDOCAIN IGE QN: 167 U/L — SIGNIFICANT CHANGE UP (ref 73–393)
LYMPHOCYTES # BLD AUTO: 0.66 K/UL — LOW (ref 1–3.3)
LYMPHOCYTES # BLD AUTO: 0.77 K/UL — LOW (ref 1–3.3)
LYMPHOCYTES # BLD AUTO: 6.2 % — LOW (ref 13–44)
LYMPHOCYTES # BLD AUTO: 8 % — LOW (ref 13–44)
MAGNESIUM SERPL-MCNC: 2.2 MG/DL — SIGNIFICANT CHANGE UP (ref 1.6–2.6)
MCHC RBC-ENTMCNC: 28.3 PG — SIGNIFICANT CHANGE UP (ref 27–34)
MCHC RBC-ENTMCNC: 28.6 PG — SIGNIFICANT CHANGE UP (ref 27–34)
MCHC RBC-ENTMCNC: 30.4 GM/DL — LOW (ref 32–36)
MCHC RBC-ENTMCNC: 31.4 GM/DL — LOW (ref 32–36)
MCV RBC AUTO: 91.4 FL — SIGNIFICANT CHANGE UP (ref 80–100)
MCV RBC AUTO: 93.2 FL — SIGNIFICANT CHANGE UP (ref 80–100)
MONOCYTES # BLD AUTO: 0.48 K/UL — SIGNIFICANT CHANGE UP (ref 0–0.9)
MONOCYTES # BLD AUTO: 0.63 K/UL — SIGNIFICANT CHANGE UP (ref 0–0.9)
MONOCYTES NFR BLD AUTO: 4.5 % — SIGNIFICANT CHANGE UP (ref 2–14)
MONOCYTES NFR BLD AUTO: 6.5 % — SIGNIFICANT CHANGE UP (ref 2–14)
NEUTROPHILS # BLD AUTO: 8.12 K/UL — HIGH (ref 1.8–7.4)
NEUTROPHILS # BLD AUTO: 9.34 K/UL — HIGH (ref 1.8–7.4)
NEUTROPHILS NFR BLD AUTO: 84.5 % — HIGH (ref 43–77)
NEUTROPHILS NFR BLD AUTO: 88.1 % — HIGH (ref 43–77)
NITRITE UR-MCNC: NEGATIVE — SIGNIFICANT CHANGE UP
PH UR: 6 — SIGNIFICANT CHANGE UP (ref 5–8)
PLATELET # BLD AUTO: 118 K/UL — LOW (ref 150–400)
PLATELET # BLD AUTO: 121 K/UL — LOW (ref 150–400)
POTASSIUM SERPL-MCNC: 4 MMOL/L — SIGNIFICANT CHANGE UP (ref 3.5–5.3)
POTASSIUM SERPL-SCNC: 4 MMOL/L — SIGNIFICANT CHANGE UP (ref 3.5–5.3)
PROT SERPL-MCNC: 7.8 GM/DL — SIGNIFICANT CHANGE UP (ref 6–8.3)
PROT UR-MCNC: 30 MG/DL
PROTHROM AB SERPL-ACNC: 16.2 SEC — HIGH (ref 10.5–13.4)
PROTHROM AB SERPL-ACNC: 45 SEC — HIGH (ref 10.5–13.4)
PROTHROM AB SERPL-ACNC: 53.3 SEC — HIGH (ref 10.5–13.4)
RBC # BLD: 3.85 M/UL — SIGNIFICANT CHANGE UP (ref 3.8–5.2)
RBC # BLD: 4.05 M/UL — SIGNIFICANT CHANGE UP (ref 3.8–5.2)
RBC # FLD: 15.2 % — HIGH (ref 10.3–14.5)
RBC # FLD: 15.3 % — HIGH (ref 10.3–14.5)
RSV RNA NPH QL NAA+NON-PROBE: SIGNIFICANT CHANGE UP
SARS-COV-2 RNA SPEC QL NAA+PROBE: SIGNIFICANT CHANGE UP
SODIUM SERPL-SCNC: 141 MMOL/L — SIGNIFICANT CHANGE UP (ref 135–145)
SP GR SPEC: 1.02 — SIGNIFICANT CHANGE UP (ref 1.01–1.02)
TROPONIN I, HIGH SENSITIVITY RESULT: 39.62 NG/L — SIGNIFICANT CHANGE UP
UROBILINOGEN FLD QL: 1
WBC # BLD: 10.6 K/UL — HIGH (ref 3.8–10.5)
WBC # BLD: 9.62 K/UL — SIGNIFICANT CHANGE UP (ref 3.8–10.5)
WBC # FLD AUTO: 10.6 K/UL — HIGH (ref 3.8–10.5)
WBC # FLD AUTO: 9.62 K/UL — SIGNIFICANT CHANGE UP (ref 3.8–10.5)

## 2022-07-29 PROCEDURE — 93010 ELECTROCARDIOGRAM REPORT: CPT

## 2022-07-29 PROCEDURE — 99497 ADVNCD CARE PLAN 30 MIN: CPT | Mod: 25

## 2022-07-29 PROCEDURE — 72125 CT NECK SPINE W/O DYE: CPT | Mod: 26,MA

## 2022-07-29 PROCEDURE — 71045 X-RAY EXAM CHEST 1 VIEW: CPT | Mod: 26

## 2022-07-29 PROCEDURE — 99498 ADVNCD CARE PLAN ADDL 30 MIN: CPT

## 2022-07-29 PROCEDURE — 99221 1ST HOSP IP/OBS SF/LOW 40: CPT

## 2022-07-29 PROCEDURE — 99236 HOSP IP/OBS SAME DATE HI 85: CPT

## 2022-07-29 PROCEDURE — 99285 EMERGENCY DEPT VISIT HI MDM: CPT

## 2022-07-29 PROCEDURE — 70496 CT ANGIOGRAPHY HEAD: CPT | Mod: 26,MA

## 2022-07-29 PROCEDURE — 70498 CT ANGIOGRAPHY NECK: CPT | Mod: 26,MA

## 2022-07-29 RX ORDER — ONDANSETRON 8 MG/1
4 TABLET, FILM COATED ORAL ONCE
Refills: 0 | Status: COMPLETED | OUTPATIENT
Start: 2022-07-29 | End: 2022-07-29

## 2022-07-29 RX ORDER — WARFARIN SODIUM 2.5 MG/1
1.5 TABLET ORAL
Qty: 0 | Refills: 0 | DISCHARGE

## 2022-07-29 RX ORDER — CEFTRIAXONE 500 MG/1
1000 INJECTION, POWDER, FOR SOLUTION INTRAMUSCULAR; INTRAVENOUS EVERY 24 HOURS
Refills: 0 | Status: DISCONTINUED | OUTPATIENT
Start: 2022-07-29 | End: 2022-07-29

## 2022-07-29 RX ORDER — ROBINUL 0.2 MG/ML
0.2 INJECTION INTRAMUSCULAR; INTRAVENOUS EVERY 6 HOURS
Refills: 0 | Status: DISCONTINUED | OUTPATIENT
Start: 2022-07-29 | End: 2022-07-29

## 2022-07-29 RX ORDER — POTASSIUM CHLORIDE 20 MEQ
2 PACKET (EA) ORAL
Qty: 0 | Refills: 0 | DISCHARGE

## 2022-07-29 RX ORDER — ONDANSETRON 8 MG/1
4 TABLET, FILM COATED ORAL EVERY 8 HOURS
Refills: 0 | Status: DISCONTINUED | OUTPATIENT
Start: 2022-07-29 | End: 2022-07-29

## 2022-07-29 RX ORDER — HALOPERIDOL DECANOATE 100 MG/ML
2 INJECTION INTRAMUSCULAR ONCE
Refills: 0 | Status: COMPLETED | OUTPATIENT
Start: 2022-07-29 | End: 2022-07-29

## 2022-07-29 RX ORDER — ACETAMINOPHEN 500 MG
650 TABLET ORAL EVERY 6 HOURS
Refills: 0 | Status: DISCONTINUED | OUTPATIENT
Start: 2022-07-29 | End: 2022-07-29

## 2022-07-29 RX ORDER — ROBINUL 0.2 MG/ML
0.2 INJECTION INTRAMUSCULAR; INTRAVENOUS
Qty: 0 | Refills: 0 | DISCHARGE
Start: 2022-07-29

## 2022-07-29 RX ORDER — LANOLIN ALCOHOL/MO/W.PET/CERES
3 CREAM (GRAM) TOPICAL AT BEDTIME
Refills: 0 | Status: DISCONTINUED | OUTPATIENT
Start: 2022-07-29 | End: 2022-07-29

## 2022-07-29 RX ORDER — MORPHINE SULFATE 50 MG/1
2 CAPSULE, EXTENDED RELEASE ORAL
Qty: 0 | Refills: 0 | DISCHARGE
Start: 2022-07-29

## 2022-07-29 RX ORDER — MORPHINE SULFATE 50 MG/1
2 CAPSULE, EXTENDED RELEASE ORAL ONCE
Refills: 0 | Status: DISCONTINUED | OUTPATIENT
Start: 2022-07-29 | End: 2022-07-29

## 2022-07-29 RX ORDER — LANOLIN ALCOHOL/MO/W.PET/CERES
2 CREAM (GRAM) TOPICAL
Qty: 0 | Refills: 0 | DISCHARGE

## 2022-07-29 RX ORDER — MORPHINE SULFATE 50 MG/1
2 CAPSULE, EXTENDED RELEASE ORAL EVERY 4 HOURS
Refills: 0 | Status: DISCONTINUED | OUTPATIENT
Start: 2022-07-29 | End: 2022-07-29

## 2022-07-29 RX ORDER — PHYTONADIONE (VIT K1) 5 MG
10 TABLET ORAL ONCE
Refills: 0 | Status: COMPLETED | OUTPATIENT
Start: 2022-07-29 | End: 2022-07-29

## 2022-07-29 RX ORDER — ACETAMINOPHEN 500 MG
3 TABLET ORAL
Qty: 0 | Refills: 0 | DISCHARGE

## 2022-07-29 RX ORDER — CEFTRIAXONE 500 MG/1
1000 INJECTION, POWDER, FOR SOLUTION INTRAMUSCULAR; INTRAVENOUS ONCE
Refills: 0 | Status: COMPLETED | OUTPATIENT
Start: 2022-07-29 | End: 2022-07-29

## 2022-07-29 RX ORDER — SPIRONOLACTONE 25 MG/1
0.5 TABLET, FILM COATED ORAL
Qty: 0 | Refills: 0 | DISCHARGE

## 2022-07-29 RX ORDER — WARFARIN SODIUM 2.5 MG/1
1 TABLET ORAL
Qty: 0 | Refills: 0 | DISCHARGE

## 2022-07-29 RX ORDER — SODIUM CHLORIDE 9 MG/ML
1000 INJECTION INTRAMUSCULAR; INTRAVENOUS; SUBCUTANEOUS ONCE
Refills: 0 | Status: COMPLETED | OUTPATIENT
Start: 2022-07-29 | End: 2022-07-29

## 2022-07-29 RX ORDER — PROTHROMBIN COMPLEX CONCENTRATE (HUMAN) 25.5; 16.5; 24; 22; 22; 26 [IU]/ML; [IU]/ML; [IU]/ML; [IU]/ML; [IU]/ML; [IU]/ML
1500 POWDER, FOR SOLUTION INTRAVENOUS ONCE
Refills: 0 | Status: COMPLETED | OUTPATIENT
Start: 2022-07-29 | End: 2022-07-29

## 2022-07-29 RX ADMIN — CEFTRIAXONE 100 MILLIGRAM(S): 500 INJECTION, POWDER, FOR SOLUTION INTRAMUSCULAR; INTRAVENOUS at 12:52

## 2022-07-29 RX ADMIN — HALOPERIDOL DECANOATE 2 MILLIGRAM(S): 100 INJECTION INTRAMUSCULAR at 04:00

## 2022-07-29 RX ADMIN — SODIUM CHLORIDE 1000 MILLILITER(S): 9 INJECTION INTRAMUSCULAR; INTRAVENOUS; SUBCUTANEOUS at 03:18

## 2022-07-29 RX ADMIN — Medication 10 MILLIGRAM(S): at 06:07

## 2022-07-29 RX ADMIN — PROTHROMBIN COMPLEX CONCENTRATE (HUMAN) 400 INTERNATIONAL UNIT(S): 25.5; 16.5; 24; 22; 22; 26 POWDER, FOR SOLUTION INTRAVENOUS at 05:23

## 2022-07-29 RX ADMIN — Medication 102 MILLIGRAM(S): at 05:37

## 2022-07-29 RX ADMIN — MORPHINE SULFATE 2 MILLIGRAM(S): 50 CAPSULE, EXTENDED RELEASE ORAL at 05:20

## 2022-07-29 RX ADMIN — CEFTRIAXONE 100 MILLIGRAM(S): 500 INJECTION, POWDER, FOR SOLUTION INTRAMUSCULAR; INTRAVENOUS at 06:23

## 2022-07-29 RX ADMIN — PROTHROMBIN COMPLEX CONCENTRATE (HUMAN) 1500 INTERNATIONAL UNIT(S): 25.5; 16.5; 24; 22; 22; 26 POWDER, FOR SOLUTION INTRAVENOUS at 05:31

## 2022-07-29 RX ADMIN — ONDANSETRON 4 MILLIGRAM(S): 8 TABLET, FILM COATED ORAL at 03:18

## 2022-07-29 NOTE — ED ADULT NURSE NOTE - NSIMPLEMENTINTERV_GEN_ALL_ED
Implemented All Fall with Harm Risk Interventions:  [place] to call system. Call bell, personal items and telephone within reach. Instruct patient to call for assistance. Room bathroom lighting operational. Non-slip footwear when patient is off stretcher. Physically safe environment: no spills, clutter or unnecessary equipment. Stretcher in lowest position, wheels locked, appropriate side rails in place. Provide visual cue, wrist band, yellow gown, etc. Monitor gait and stability. Monitor for mental status changes and reorient to person, place, and time. Review medications for side effects contributing to fall risk. Reinforce activity limits and safety measures with patient and family. Provide visual clues: red socks.

## 2022-07-29 NOTE — CHART NOTE - NSCHARTNOTEFT_GEN_A_CORE
HPI: As per medical record,   89-year-old female with past medical history of A. fib on Coumadin, dementia, hyperlipidemia, CHF, hypotension, hypothyroidism presents with altered mental status.  Son who lives and cares for her was up at midnight went to check on her noted she was awake . He states that she seemed very confused this evening when they were having dinner. Patient was uncertain which way her bedroom was which is not typical for her.  She then bumped into the wall and slid down to the floor.  Landing on her buttocks did not really hit her head.  He asked his mother if it would be ok to call [facility], she agreed . EMS then was called and patient was transported to the hospital. On arrival she was noted to be agitated and restless so they gave her Haldol so she can cooperate with the scans . She was taken to the CT suite CT Brain demonstrated a Large Left Intraventricular Hemorrhage with resultant Acute hydrocephalous with extension of the IVH into the 3rd and the 4th ventricles also noted in the bilateral occipital horns . She was seen and examined in the ED Son was at the bedside with brother on speaker on the phone . Son reports he was vigilant in making sure her INR was checked on a regular basis .INR: 3.83: on arrival then INR: 4.53: at 5 am [facility] for INR reversal was ordered . O2 sat was noted to be 64% she was placed on face mask O2 Sat improved to 89% , while  sons are discussing Goals of care and how to proceed . She had tachycardia 115-120 .  (29 Jul 2022 08:58)      PERTINENT PMH REVIEWED:  [ x ] YES [ ] NO           Primary Contact: son Pablo ([phone])    HCP [  ] Surrogate [ x  ] Guardian [   ]    Mental Status: Pt w/dx dementia. Appears to be sleeping comfortably.   Concerns of Depression [  ] Unable to assess  Anxiety [   ] Unable to assess  Baseline ADLs (prior to admission):  Independent [ ] moderately [ ] fully   Dependent   [ x ] moderately [ ]fully    Family Meeting attendees: Pt's two son participated in discussion with Dr. Pulido 7/29    Anticipated Grief: Patient[  ] Family [ x ]    Caregiver [place] Assessed: Yes [ x ] No [  ]    Restoration: Christian    Spiritual Concerns: None reported.  available.     Goals of Care: Comfort [ x ] Rehabilitation [  ] Curative [  ] Life Prolonging [  ]    Previous Services: hx w/ [facility] and [facility] in past     ADVANCE DIRECTIVES:  [ x ] YES [ ] NO    - MOLST reflecting DNR/DNI/CMO/NFT, determine use abx, no IV fluids, send to hospital.    - Living Will located on [employer].     Anticipated D/C Plan: referral to be placed with [facility] for Home Hospice (spoke with DONAL Levy)                     Summary: This SW met with patient's two sons who are at pt's bedside. Palliative SW role explained. Pt sleeping at this time. Sons acknowledges discussion they had with Dr. Pulido & confirm the goal is referral for Home Hospice. Family offers no questions and/or concerns at this time. They appeared receptive to visit.     Emotional support provided. SW relayed availability. MOLST on chart. Referral to be made for [facility]. Our team will continue to follow.
SW received phone call from pt's son Chong ([phone]) who reports him & his brother spoke to hospice agency & now feel pt may be better in an inpatient hospice setting. Chong is in agreement to place referral for inpatient hospice with [facility]. Emotional support provided. Our team will continue to follow.

## 2022-07-29 NOTE — CONSULT NOTE ADULT - CONVERSATION DETAILS
We discussed Palliative Care team being a supportive team when a patient has ongoing illnesses.  We also discussed that it is not an end of life care service, but can help navigate symptoms and emotional support throughout their hospital stay here.     Hospice was explained as well as an end of life care philosophy. When a disease cannot be cured, or family/patient decide the treatment burdens out weight the risk and chose to change focus of treatment from cure to quality/comfort.     CPR and Intubation discussed and LYSSA completed     Long discussion with family at this stage, we are recommending comfort measures as patient continues to decline regardless of maximal support.  We discussed prolonging suffering rather than prolonging life.     We discussed what that would mean,  no longer doing blood tests, dx imaging, abx, and focos on  comfort w/ prn medications to address any symptoms that may arise. This would mean shortened life span but would focus on comfort and quality at the end of life.

## 2022-07-29 NOTE — ED PROVIDER NOTE - OBJECTIVE STATEMENT
89-year-old female with past medical history of A. mckenzie on Coumadin, dementia, hyperlipidemia, CHF, hypotension, hypothyroidism presents with altered mental status.  Son who cares for her and lives with her states that she seemed very confused this evening when they were having dinner.  Patient was uncertain which way her bedroom was which is not typical for her.  She then bumped into the wall and slid down to the floor.  Landing on her buttocks did not really hit her head.  EMS was called and patient was transported to the hospital.  Here patient was more confused and started coughing/clearing secretions frequently.

## 2022-07-29 NOTE — PHARMACOTHERAPY INTERVENTION NOTE - COMMENTS
Med history complete, reviewed medications and allergies with patients son and confirmed medication list with doctor first med profile, all medication related questions answered

## 2022-07-29 NOTE — DISCHARGE NOTE PROVIDER - NSDCCPCAREPLAN_GEN_ALL_CORE_FT
PRINCIPAL DISCHARGE DIAGNOSIS  Diagnosis: Non-aneurysmal subarachnoid intracranial hemorrhage  Assessment and Plan of Treatment: comfort care

## 2022-07-29 NOTE — ED ADULT NURSE REASSESSMENT NOTE - NS ED NURSE REASSESS COMMENT FT1
Pt is unresponsive. VSS. Skin care given. Skin intact. Pt's son at the bedside. Instructed in plan of care. Monitored closely. Call bell in reach.

## 2022-07-29 NOTE — DISCHARGE NOTE PROVIDER - NSDCMRMEDTOKEN_GEN_ALL_CORE_FT
glycopyrrolate 0.2 mg/mL injectable solution: 0.2 milligram(s) injectable every 6 hours, As Needed  morphine: 2 milligram(s) intravenous every 6 hours, As Needed

## 2022-07-29 NOTE — ED ADULT NURSE NOTE - OBJECTIVE STATEMENT
Unable to assess pt mental status. BIBEMS for AMS. Son who cares for her and lives with her states that she seemed very confused this evening when they were having dinner. As per son, Patient was uncertain which way her bedroom was which is not typical for her.  She then bumped into the wall and slid down to the floor.  Landing on her buttock. Unknown headstrike or LOC. EMS was called and patient was transported to the hospital.  Here patient was more confused and started coughing/clearing secretions frequently.

## 2022-07-29 NOTE — H&P ADULT - HISTORY OF PRESENT ILLNESS
89-year-old female with past medical history of A. fib on Coumadin, dementia, hyperlipidemia, CHF, hypotension, hypothyroidism presents with altered mental status.  Son who lives and cares for her was up at midnight went to check on her noted she was awake . He states that she seemed very confused this evening when they were having dinner. Patient was uncertain which way her bedroom was which is not typical for her.  She then bumped into the wall and slid down to the floor.  Landing on her buttocks did not really hit her head.  He asked his mother if it would be ok to call [facility], she agreed . EMS then was called and patient was transported to the hospital. On arrival she was noted to be agitated and restless so they gave her Haldol so she can cooperate with the scans . She was taken to the CT suite CT Brain demonstrated a Large Left Intraventricular Hemorrhage with resultant Acute hydrocephalous with extension of the IVH into the 3rd and the 4th ventricles also noted in the bilateral occipital horns . She was seen and examined in the ED Son was at the bedside with brother on speaker on the phone . Son reports he was vigilant in making sure her INR was checked on a regular basis .INR: 3.83: on arrival then INR: 4.53: at 5 am [facility] for INR reversal was ordered . O2 sat was noted to be 64% she was placed on face mask O2 Sat improved to 89% , while  sons are discussing Goals of care and how to proceed . She had tachycardia 115-120 .  89-year-old female with past medical history of A. fib on Coumadin, dementia, hyperlipidemia, CHF, hypotension, hypothyroidism presents with altered mental status.  Son who lives and cares for her was up at midnight went to check on her noted she was awake . He states that she seemed very confused this evening when they were having dinner. Patient was uncertain which way her bedroom was which is not typical for her.  She then bumped into the wall and slid down to the floor.  Landing on her buttocks did not really hit her head.  He asked his mother if it would be ok to call [facility], she agreed . EMS then was called and patient was transported to the hospital. On arrival she was noted to be agitated and restless so they gave her Haldol so she can cooperate with the scans . She was taken to the CT suite CT Brain demonstrated a Large Left Intraventricular Hemorrhage with resultant Acute hydrocephalous with extension of the IVH into the 3rd and the 4th ventricles also noted in the bilateral occipital horns . She was seen and examined in the ED Son was at the bedside with brother on speaker on the phone . Son reports he was vigilant in making sure her INR was checked on a regular basis .INR: 3.83: on arrival then INR: 4.53: at 5 am [facility] for INR reversal was ordered . O2 sat was noted to be 64% she was placed on face mask O2 Sat improved to 89% , while  sons are discussing Goals of care and how to proceed . She had tachycardia 115-120 .    pt sen on floor pt unresponsive, does not open eyes , audible airway secretions , son at bedside tearful, says he wishes Mom to be comfortable and would like hospice care

## 2022-07-29 NOTE — PATIENT PROFILE ADULT - FALL HARM RISK - HARM RISK INTERVENTIONS

## 2022-07-29 NOTE — CONSULT NOTE ADULT - SUBJECTIVE AND OBJECTIVE BOX
Neurosurgery Consult Note     Patient is a 89y old  Female who presents with a chief complaint of Intracerebral hemorrhage     HPI:  89-year-old female with past medical history of A. fib on Coumadin, dementia, hyperlipidemia, CHF, hypotension, hypothyroidism presents with altered mental status.  Son who lives and cares for her was up at midnight went to check on her noted she was awake . He states that she seemed very confused this evening when they were having dinner. Patient was uncertain which way her bedroom was which is not typical for her.  She then bumped into the wall and slid down to the floor.  Landing on her buttocks did not really hit her head.  He asked his mother if it would be ok to call [facility], she agreed . EMS then was called and patient was transported to the hospital. On arrival she was noted to be agitated and restless so they gave her Haldol so she can cooperate with the scans . She was taken to the CT suite CT Brain demonstrated a Large Left Intraventricular Hemorrhage with resultant Acute hydrocephalous with extension of the IVH into the 3rd and the 4th ventricles also noted in the bilateral occipital horns . She was seen and examined in the ED Son was at the bedside with brother on speaker on the phone . Son reports he was vigilant in making sure her INR was checked on a regular basis .INR: 3.83: on arrival then INR: 4.53: at 5 am K-[facility]ra for INR reversal was ordered . O2 sat was noted to be 64% she was placed on face mask O2 Sat improved to 89% , while  sons are discussing Goals of care and how to proceed . She had tachycardia 115-120 .       PAST MEDICAL   AF (atrial fibrillation)  On Warfarin  HTN (hypertension)  Hyperlipidemia  Hypothyroid  Congestive heart failure  Preserved EF  Pleural effusion  Severe pulmonary hypertension  Moderate mitral regurgitation  Severe tricuspid regurgitation    SURGICAL HISTORY:  S/P appendectomy      Psoc Hx :Live with her son who cares for her     FHx:    of an ICH due to Coumadin     Allergies:    Bactrim (Unknown)  Intolerances    MEDICATIONS  (STANDING):  morphine  - Injectable 2 milliGRAM(s) IV Push Once      Vital Signs Last 24 Hrs  T(C): 36.7 (2022 02:24), Max: 36.7 (2022 02:24)  T(F): 98.1 (2022 02:24), Max: 98.1 (2022 02:24)  HR: 84 (2022 02:24) (84 - 84)  BP: 139/80 (2022 02:24) (139/80 - 139/80)  BP(mean): --  RR: 18 (2022 02:24) (18 - 18)  SpO2: 91% (2022 02:24) (91% - 91%)    Parameters below as of 2022 02:24  Patient On (Oxygen Delivery Method): room air    PHYSICAL EXAM:    Constitutional: Awake eyes open , in respiratory distress not able to swallow her secretions     Eyes:2 mm reactive to light     Respiratory: Labored breathing     Skin: bruising noted from needle sticks     Neuro:     Mental status:   The patient is Awake alert, makes eye contact attempted speech inaudible  No command following   Pupils 2 mm reactive to light reaction   Motor: moves Upper extremities spontaneously not to command Moves Lower extremities by withdrawal to noxious painful stimuli   GCS : 8 (E4 V1 M4)  ICH Score : 4 c/w 97% mortality     Labs:              10.9   10.60 )-----------( 118      ( 2022 05:01 )             35.9    07-29    141  |  107  |  31<H>  ----------------------------<  163<H>  4.0   |  25  |  1.33<H>    Ca    9.4      2022 03:12  Mg     2.2         TPro  7.8  /  Alb  3.8  /  TBili  1.7<H>  /  DBili  x   /  AST  23  /  ALT  18  /  AlkPhos  98  07-      PT/INR - ( 2022 05:01 )   PT: 53.3 sec;   INR: 4.53 ratio         PTT - ( 2022 03:12 )  PTT:41.0 sec    Urinalysis Basic - ( 2022 02:54 )    Color: Yellow / Appearance: Slightly Turbid / S.020 / pH: x  Gluc: x / Ketone: Negative  / Bili: Negative / Urobili: 1   Blood: x / Protein: 30 mg/dL / Nitrite: Negative   Leuk Esterase: Small / RBC: 11-25 /HPF / WBC >50   Sq Epi: x / Non Sq Epi: Negative / Bacteria: TNTC        Imaging:                    ACC: [id number] EXAM:  CT BRAIN                          PROCEDURE DATE:  2022    INTERPRETATION:  CLINICAL INDICATION:  Known intracranial hemorrhage.    TECHNIQUE:  Initially, a noncontrast CT of the brain is performed with   axial images from the cranial vertex to the skull base.  Thereafter, a   bolus of IV contrast is administered to acquire a CT angiogram of the   vertebral and carotid arterial vasculature from the aortic arch to the   skull vertex.  90 mL of Omnipaque 350 was injected intravenously. 10 mL   was discarded. MIP images were also obtained.    FINDINGS:    CT BRAIN:    Intraventricular hemorrhage is noted in the lateral ventricles, left   greater than right and third ventricle in addition, a small amount of   hemorrhage enters the aqueduct. There is dilatation of the ventricular   system consistent with hydrocephalus. Periventricular hypodensity is   nonspecific as it may represent chronic microvascular ischemic disease   versus transependymal flow of CSF.  Gray-white matter interface is   grossly preserved.  There is no CT evidence of an acute or recent   subacute transcortical infarct.    Regional soft tissues and bony calvarium are unremarkable.  Paranasal   sinuses and mastoid air cells are well aerated.    CT ANGIOGRAM:    The common carotid arteries are widely patent from the origins to the   bifurcations. Atherosclerotic changes at the common carotid artery   bifurcations without hemodynamically significant stenosis.  The cervical   internal carotid arteries are patent without significant stenosis from   the bifurcations to the skull base.  The vertebral arteries are widely   patent from the origins to the skull base.  There is no evidence of   cervical vertebral or carotid artery dissection.    The skull base and intracranial carotid arteries are patent without   significant stenosis.  The proximalMCAs and ACAs are patent without   significant stenosis.  The distal LEATHA and MCA branches are grossly   symmetric.    The skull base and intradural vertebral arteries are patent without   significant stenosis.  The basilar artery is widely patent.  The proximal   PCAs are patent without significant stenosis.    There is no evidence of an intracranial arterial aneurysm on CTA.    The soft tissue of the neck are grossly unremarkable. Partially   visualized pleural effusions at the lung apices. The regional osseous   structures are unremarkable.    IMPRESSION:    CT BRAIN:  Intraventricular hemorrhage with associated hydrocephalus as   described above. Periventricular hypodensity is nonspecific as it may   represent chronic microvascular ischemic disease versus transependymal   flow of CSF.    CTA NECK:  No significant stenosis of the cervical carotid or vertebral   arteries.    Partially visualized pleural effusions at the lung apices.    CTA HEAD:  No significant stenosis or occlusion of the major proximal   branches of the [place]. No AVM.    ROB SHEARER MD; Attending Radiologist  This document has been electronically signed. 2022  5:26AM    CTA NECK:  No significant stenosis of the cervical carotid or vertebral   arteries.    Partially visualized pleural effusions at the lung apices.    CTA HEAD:  No significant stenosis or occlusion of the major proximal   branches of the [place]. No AVM.    ROB SHEARER MD; Attending Radiologist  This document has been electronically signed. 2022  5:26AM

## 2022-07-29 NOTE — CONSULT NOTE ADULT - SUBJECTIVE AND OBJECTIVE BOX
HPI:   89-year-old female with past medical history of A. fib on Coumadin, dementia, hyperlipidemia, CHF, hypotension, hypothyroidism presents with altered mental status.  Son who lives and cares for her was up at midnight went to check on her noted she was awake . He states that she seemed very confused this evening when they were having dinner. Patient was uncertain which way her bedroom was which is not typical for her.  She then bumped into the wall and slid down to the floor.  Landing on her buttocks did not really hit her head.  He asked his mother if it would be ok to call [facility], she agreed . EMS then was called and patient was transported to the hospital. On arrival she was noted to be agitated and restless so they gave her Haldol so she can cooperate with the scans . She was taken to the CT suite CT Brain demonstrated a Large Left Intraventricular Hemorrhage with resultant Acute hydrocephalous with extension of the IVH into the 3rd and the 4th ventricles also noted in the bilateral occipital horns . She was seen and examined in the ED Son was at the bedside with brother on speaker on the phone . Son reports he was vigilant in making sure her INR was checked on a regular basis .INR: 3.83: on arrival then INR: 4.53: at 5 am [facility] for INR reversal was ordered . O2 sat was noted to be 64% she was placed on face mask O2 Sat improved to 89% , while  sons are discussing Goals of care and how to proceed . She had tachycardia       7/29  pt seen and examined  lethargic   calm at thsi time (sp haldol   family at bedside   ros not possible d/t mental status    PAIN: ( )Yes   ( )No  Pt unable to characterise d/t clinical status       DYSPNEA: ( ) Yes  (x ) No  Level:    PAST MEDICAL & SURGICAL HISTORY:  AF (atrial fibrillation)  On Warfarin      HTN (hypertension)      Hyperlipidemia      Hypothyroid      Congestive heart failure  Preserved EF      Pleural effusion      Severe pulmonary hypertension      Moderate mitral regurgitation      Severe tricuspid regurgitation      S/P appendectomy          SOCIAL HX:    Hx opiate tolerance ( )YES  (x )NO    Baseline ADLs  (Prior to Admission)  ( x) Independent   ( )Dependent    FAMILY HISTORY:  FH: lung cancer (Father)        Review of Systems:     Unable to obtain/Limited due to: lethargy      PHYSICAL EXAM:    Vital Signs Last 24 Hrs  T(C): 37.2 (29 Jul 2022 06:10), Max: 37.2 (29 Jul 2022 06:10)  T(F): 98.9 (29 Jul 2022 06:10), Max: 98.9 (29 Jul 2022 06:10)  HR: 76 (29 Jul 2022 07:24) (71 - 84)  BP: 92/72 (29 Jul 2022 07:24) (79/35 - 139/80)  BP(mean): 75 (29 Jul 2022 07:24) (45 - 75)  RR: 18 (29 Jul 2022 07:24) (18 - 21)  SpO2: 99% (29 Jul 2022 07:24) (91% - 99%)    Parameters below as of 29 Jul 2022 07:24  Patient On (Oxygen Delivery Method): mask, nonrebreather  O2 Flow (L/min): 15    Daily Height in cm: 167.64 (29 Jul 2022 02:24)    Daily     PPSV2: 20  %  FAST:    General:  NAD  Mental Status: lethragic  HEENT:   perrl  Lungs: ctabl b/l bs  Cardiac: s1s2 no mgr  GI: soft nontender +BS  : voids  Ext: no swelling  Neuro: lethargic      LABS:                        10.9   10.60 )-----------( 118      ( 29 Jul 2022 05:01 )             35.9     07-29    141  |  107  |  31<H>  ----------------------------<  163<H>  4.0   |  25  |  1.33<H>    Ca    9.4      29 Jul 2022 03:12  Mg     2.2     07-29    TPro  7.8  /  Alb  3.8  /  TBili  1.7<H>  /  DBili  x   /  AST  23  /  ALT  18  /  AlkPhos  98  07-29    PT/INR - ( 29 Jul 2022 05:35 )   PT: 16.2 sec;   INR: 1.39 ratio         PTT - ( 29 Jul 2022 05:35 )  PTT:32.0 sec  Albumin: Albumin, Serum: 3.8 g/dL (07-29 @ 03:12)      Allergies    Bactrim (Unknown)    Intolerances      MEDICATIONS  (STANDING):  cefTRIAXone   IVPB 1000 milliGRAM(s) IV Intermittent every 24 hours    MEDICATIONS  (PRN):  acetaminophen  Suppository .. 650 milliGRAM(s) Rectal every 6 hours PRN Mild Pain (1 - 3)  aluminum hydroxide/magnesium hydroxide/simethicone Suspension 30 milliLiter(s) Oral every 4 hours PRN Dyspepsia  melatonin 3 milliGRAM(s) Oral at bedtime PRN Insomnia  ondansetron Injectable 4 milliGRAM(s) IV Push every 8 hours PRN Nausea and/or Vomiting      RADIOLOGY/ADDITIONAL STUDIES:

## 2022-07-29 NOTE — CONSULT NOTE ADULT - ASSESSMENT
89 year old female with multiple medical problems including significantly Atrial Fibrillation on Coumadin , found by son with an acute mental status change . She was brought in by EMS work up revealed on non contrast CT of the Brain a Massive Intraventricular hemorrhage Left > Right with extension of hemorrhage into the 3rd and 4th ventricles and the bilateral occipital horns resulting in Ventriculomegaly due to acute hydrocephalous causing brain compression . Grave condition     A long discussion was had with the 2 sons to arrive at goals of care and to honor the patient wishes . Since the Sons reported their dad  as a result of an ICH they had an understanding of the gravity of the situation. It was explained if she were to undergo surgical intervention given her advanced age  she may not recover back to her baseline. They expressed an understanding that if she were to be intubated placed on ventilator support and undergo surgical intervention with a EVD, she may never come home back to her baseline .  Family have agreed to move forward with comfort care only MOLST form completed   Palliative care consult   d/w Dr Joseph who concurs 
Process of Care  --Reviewed dx/treatment problems and alignment with Goals of Care      Physical Aspects of Care  --Pain   IV Morphine 2mg [id number] PRN for pain   (if able could try Roxanol Sublingual 5mg [id number] prn)     --Bowel Regimen  denies constipation  risk for constipation d/t immobility  daily dulcolax    --Dyspnea  No SOB at this time  comfortable and in NAD   IV Morphine 2mg [id number] PRN for pain   (if able could try Roxanol Sublingual 5mg [id number] prn)     --Nausea Vomiting  denies    --Weakness  PT as tolerated     Psychological and Psychiatric Aspects of Care:   Grief Bereavement emotional support provided  --Hx of psychiatric dx: none  -Pastoral Care Available PRN     Social Aspects of Care  -SW involved     Cultural Aspects  -Primary Language: English    Goals of Care:     We discussed Palliative Care team being a supportive team when a patient has ongoing illnesses.  We also discussed that it is not an end of life care service, but can help navigate symptoms and emotional support througout their hospital stay here.    Hospice was explained as well  as an end of life care philosophy.  When a disease cannot be cured, or family/patient decide the treatment burdens out weigh the risk and one choses to change focus of treatment from cure to quality/comfort.     please refer to [facility] note above.     Prognosis: Death can occur at anytime, but if disease continues to progress naturally patient likely has days to weeks.    Ethical and Legal Aspects:   NA        Capacity: no capacity   HCP/Surrogate: both sons  Code Status: dnr dni comfort only  MOLST: dnr dni comfort only  Dispo Plan: hospice dispo    Discussed With: Case coordinated with attending and SW and RN     Time Spent: 90 minutes including the care, coordination and counseling of this patient, 50% of which was spent coordinating and counseling.

## 2022-07-29 NOTE — DISCHARGE NOTE NURSING/CASE MANAGEMENT/SOCIAL WORK - NSDCPEFALRISK_GEN_ALL_CORE
For information on Fall & Injury Prevention, visit: [email or web address] OR  [email or web address] OR  https://www.cdc.gov/steadi/patient.html

## 2022-07-29 NOTE — H&P ADULT - NSHPPHYSICALEXAM_GEN_ALL_CORE
PHYSICAL EXAM:    Daily Height in cm: 167.64 (29 Jul 2022 02:24)    Daily     ICU Vital Signs Last 24 Hrs  T(C): 37.2 (29 Jul 2022 06:10), Max: 37.2 (29 Jul 2022 06:10)  T(F): 98.9 (29 Jul 2022 06:10), Max: 98.9 (29 Jul 2022 06:10)  HR: 103 (29 Jul 2022 11:21) (71 - 103)  BP: 112/63 (29 Jul 2022 11:21) (79/35 - 139/80)  BP(mean): 75 (29 Jul 2022 07:24) (45 - 75)  ABP: --  ABP(mean): --  RR: 18 (29 Jul 2022 11:21) (18 - 21)  SpO2: 94% (29 Jul 2022 11:21) (91% - 99%)    O2 Parameters below as of 29 Jul 2022 11:21  Patient On (Oxygen Delivery Method): mask, nonrebreather  O2 Flow (L/min): 15          Constitutional: obtunded does not open eyes, on NRB mask, audible airway secreations   HEENT: Atraumatic, MIKE, Normal, No congestion  Respiratory: diffused b/l airway secretions , B/l crackles   Cardiovascular: S1S2;  Gastrointestinal: Abdomen soft, non tender, Bowel Ssounds present  Extremities: No edema,  Neurological: obtunded

## 2022-07-29 NOTE — ED PROVIDER NOTE - PROGRESS NOTE DETAILS
Westley RUSH: CT noted obvious blood on CAT scan.  [facility] ordered with vitamin K and discussed with patient's son who is her healthcare proxy.  Neurosurgery paged and at bedside.  Patient's son called his brother and the 2 of them feel she would have wanted comfort measures given the gravity of the bleed.  Previously patient was full code last signed a MOLST form in October 2020. Westley RUSH: New MOLST form completed with her son patient was made comfort measures with some limited use of antibiotics.  Patient does appear comfortable.  Patient admitted for palliative care/hospice.  Signout given to Dr. Mcghee.  GREG

## 2022-07-29 NOTE — ED PROVIDER NOTE - ENMT, MLM
clearing Airway frequently, patent, Nasal mucosa clear. Mouth with normal mucosa. Throat has no vesicles, no oropharyngeal exudates and uvula is midline.

## 2022-07-29 NOTE — DISCHARGE NOTE PROVIDER - HOSPITAL COURSE
89-year-old female with past medical history of A. fib on Coumadin, dementia, hyperlipidemia, CHF, hypotension, hypothyroidism presents with altered mental status.  Son who lives and cares for her was up at midnight went to check on her noted she was awake . He states that she seemed very confused this evening when they were having dinner. Patient was uncertain which way her bedroom was which is not typical for her.  She then bumped into the wall and slid down to the floor.  Landing on her buttocks did not really hit her head.  He asked his mother if it would be ok to call [facility], she agreed . EMS then was called and patient was transported to the hospital. On arrival she was noted to be agitated and restless so they gave her Haldol so she can cooperate with the scans . She was taken to the CT suite CT Brain demonstrated a Large Left Intraventricular Hemorrhage with resultant Acute hydrocephalous with extension of the IVH into the 3rd and the 4th ventricles also noted in the bilateral occipital horns . She was seen and examined in the ED Son was at the bedside with brother on speaker on the phone . Son reports he was vigilant in making sure her INR was checked on a regular basis .INR: 3.83: on arrival then INR: 4.53: at 5 am [facility] for INR reversal was ordered . O2 sat was noted to be 64% she was placed on face mask O2 Sat improved to 89% , while  sons are discussing Goals of care and how to proceed . She had tachycardia 115-120 .    pt sen on floor pt unresponsive, does not open eyes , audible airway secretions , son at bedside tearful, says he wishes Mom to be comfortable and would like hospice care    physical exam   Constitutional: obtunded does not open eyes, on NRB mask, audible airway secreations   HEENT: Atraumatic, MIKE, Normal, No congestion  Respiratory: diffused b/l airway secretions , B/l crackles   Cardiovascular: S1S2;  Gastrointestinal: Abdomen soft, non tender, Bowel Ssounds present  Extremities: No edema,  Neurological: [id number] year old female with multiple medical problems including significantly Atrial Fibrillation on Coumadin , found by son with an acute mental status change . She was brought in by EMS work up revealed on non contrast CT of the Brain a Massive Intraventricular hemorrhage Left > Right with extension of hemorrhage into the 3rd and 4th ventricles and the bilateral occipital horns resulting in Ventriculomegaly due to acute hydrocephalous causing brain compression . Grave condition     A/P   Acute encephalopathy secondary to massive intraventricular hemorrhage  Left > Right with extension of hemorrhage into the 3rd and 4th ventricles and the bilateral occipital horns resulting in Ventriculomegaly due to acute hydrocephalous causing brain compression   Poor prognosis   s/p Kcentra  Pt NPO  poor prognosis   high daisy for surgical intervention   family opted for conservative management and comfort care  agreeable to antibiotics if needeed  plan for dc to hospice care  for comfort care   antibiotics at this stage will not change prognosis- explained to son - dc abx , HCP family agreed    hx afib, severe pulm HTN , chronic diastolic CHF  po meds held      UTI  s/p Iv ceftriaxone    SCD for vte prophylaxis  dc to hospice care

## 2022-07-29 NOTE — ED ADULT TRIAGE NOTE - CHIEF COMPLAINT QUOTE
as per EMS patients son call for decline in mental status for 4 days.  patient awake and alert, denies pain or injury.  patient coughing up phlegm at triage.

## 2022-07-29 NOTE — DISCHARGE NOTE NURSING/CASE MANAGEMENT/SOCIAL WORK - PATIENT PORTAL LINK FT
You can access the FollowMyHealth Patient Portal offered by [facility] by registering at the following website: [email or web address]. By joining [facility]’s FollowMyHealth portal, you will also be able to view your health information using other applications (apps) compatible with our system.

## 2022-07-29 NOTE — H&P ADULT - NSHPLABSRESULTS_GEN_ALL_CORE
10.9   10.60 )-----------( 118      ( 2022 05:01 )             35.9       CBC Full  -  ( 2022 05:01 )  WBC Count : 10.60 K/uL  RBC Count : 3.85 M/uL  Hemoglobin : 10.9 g/dL  Hematocrit : 35.9 %  Platelet Count - Automated : 118 K/uL  Mean Cell Volume : 93.2 fl  Mean Cell Hemoglobin : 28.3 pg  Mean Cell Hemoglobin Concentration : 30.4 gm/dL  Auto Neutrophil # : 9.34 K/uL  Auto Lymphocyte # : 0.66 K/uL  Auto Monocyte # : 0.48 K/uL  Auto Eosinophil # : 0.00 K/uL  Auto Basophil # : 0.05 K/uL  Auto Neutrophil % : 88.1 %  Auto Lymphocyte % : 6.2 %  Auto Monocyte % : 4.5 %  Auto Eosinophil % : 0.0 %  Auto Basophil % : 0.5 %          141  |  107  |  31<H>  ----------------------------<  163<H>  4.0   |  25  |  1.33<H>    Ca    9.4      2022 03:12  Mg     2.2         TPro  7.8  /  Alb  3.8  /  TBili  1.7<H>  /  DBili  x   /  AST  23  /  ALT  18  /  AlkPhos  98        LIVER FUNCTIONS - ( 2022 03:12 )  Alb: 3.8 g/dL / Pro: 7.8 gm/dL / ALK PHOS: 98 U/L / ALT: 18 U/L / AST: 23 U/L / GGT: x             PT/INR - ( 2022 05:35 )   PT: 16.2 sec;   INR: 1.39 ratio         PTT - ( 2022 05:35 )  PTT:32.0 sec          Urinalysis Basic - ( 2022 02:54 )    Color: Yellow / Appearance: Slightly Turbid / S.020 / pH: x  Gluc: x / Ketone: Negative  / Bili: Negative / Urobili: 1   Blood: x / Protein: 30 mg/dL / Nitrite: Negative   Leuk Esterase: Small / RBC: 11-25 /HPF / WBC >50   Sq Epi: x / Non Sq Epi: Negative / Bacteria: TNTC            MEDICATIONS  (STANDING):  cefTRIAXone   IVPB 1000 milliGRAM(s) IV Intermittent every 24 hours

## 2022-07-29 NOTE — CONSULT NOTE ADULT - PROBLEM/RECOMMENDATION-1
My medical assistant will call patient with results  The  colon polyp removed was called an adenoma  This is a pre-cancerous lesion and was completely removed  There was no evidence of cancer in the polyp       he should have the colonoscopy repeated in 7 years due to a history of colon polyps
DISPLAY PLAN FREE TEXT
DISPLAY PLAN FREE TEXT

## 2022-07-29 NOTE — H&P ADULT - ASSESSMENT
89 year old female with multiple medical problems including significantly Atrial Fibrillation on Coumadin , found by son with an acute mental status change . She was brought in by EMS work up revealed on non contrast CT of the Brain a Massive Intraventricular hemorrhage Left > Right with extension of hemorrhage into the 3rd and 4th ventricles and the bilateral occipital horns resulting in Ventriculomegaly due to acute hydrocephalous causing brain compression . Grave condition     A/P   Acute encephalopathy secondary to massive intraventricular hemorrhage  Left > Right with extension of hemorrhage into the 3rd and 4th ventricles and the bilateral occipital horns resulting in Ventriculomegaly due to acute hydrocephalous causing brain compression   Poor prognosis   s/p Kcentra  Pt NPO  poor prognosis   high daisy for surgical intervention   family opted for conservative management and comfort care  agreeable to antibiotics if needeed  palliative consult    hx afib, severe pulm HTN , chronic diastolic CHF  po meds held    UTI  IV ceftriaxone     SCD 89 year old female with multiple medical problems including significantly Atrial Fibrillation on Coumadin , found by son with an acute mental status change . She was brought in by EMS work up revealed on non contrast CT of the Brain a Massive Intraventricular hemorrhage Left > Right with extension of hemorrhage into the 3rd and 4th ventricles and the bilateral occipital horns resulting in Ventriculomegaly due to acute hydrocephalous causing brain compression . Grave condition     A/P   Acute encephalopathy secondary to massive intraventricular hemorrhage  Left > Right with extension of hemorrhage into the 3rd and 4th ventricles and the bilateral occipital horns resulting in Ventriculomegaly due to acute hydrocephalous causing brain compression   Poor prognosis   s/p Kcentra  Pt NPO  poor prognosis   high daisy for surgical intervention   family opted for conservative management and comfort care  agreeable to antibiotics if needeed  plan for dc to hospice care  for comfort care   antibiotics at this stage will not change prognosis- explained to son - dc abx , HCP family agreed    hx afib, severe pulm HTN , chronic diastolic CHF  po meds held    UTI  s/p Iv ceftriaxone    SCD for vte prophylaxis  dc to hospice care

## 2022-08-09 DIAGNOSIS — I48.91 UNSPECIFIED ATRIAL FIBRILLATION: ICD-10-CM

## 2022-08-09 DIAGNOSIS — G93.49 OTHER ENCEPHALOPATHY: ICD-10-CM

## 2022-08-09 DIAGNOSIS — I60.6 NONTRAUMATIC SUBARACHNOID HEMORRHAGE FROM OTHER INTRACRANIAL ARTERIES: ICD-10-CM

## 2022-08-09 DIAGNOSIS — R00.0 TACHYCARDIA, UNSPECIFIED: ICD-10-CM

## 2022-08-09 DIAGNOSIS — I08.1 RHEUMATIC DISORDERS OF BOTH MITRAL AND TRICUSPID VALVES: ICD-10-CM

## 2022-08-09 DIAGNOSIS — I50.32 CHRONIC DIASTOLIC (CONGESTIVE) HEART FAILURE: ICD-10-CM

## 2022-08-09 DIAGNOSIS — Z79.890 HORMONE REPLACEMENT THERAPY: ICD-10-CM

## 2022-08-09 DIAGNOSIS — F03.91 UNSPECIFIED DEMENTIA WITH BEHAVIORAL DISTURBANCE: ICD-10-CM

## 2022-08-09 DIAGNOSIS — Z51.5 ENCOUNTER FOR PALLIATIVE CARE: ICD-10-CM

## 2022-08-09 DIAGNOSIS — Z79.01 LONG TERM (CURRENT) USE OF ANTICOAGULANTS: ICD-10-CM

## 2022-08-09 DIAGNOSIS — G93.89 OTHER SPECIFIED DISORDERS OF BRAIN: ICD-10-CM
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
## 2022-08-09 DIAGNOSIS — N39.0 URINARY TRACT INFECTION, SITE NOT SPECIFIED: ICD-10-CM

## 2022-08-09 DIAGNOSIS — Z88.1 ALLERGY STATUS TO OTHER ANTIBIOTIC AGENTS STATUS: ICD-10-CM

## 2022-08-09 DIAGNOSIS — I27.20 PULMONARY HYPERTENSION, UNSPECIFIED: ICD-10-CM

## 2022-08-09 DIAGNOSIS — Z66 DO NOT RESUSCITATE: ICD-10-CM

## 2022-08-09 DIAGNOSIS — G93.5 COMPRESSION OF BRAIN: ICD-10-CM

## 2022-08-09 DIAGNOSIS — E78.5 HYPERLIPIDEMIA, UNSPECIFIED: ICD-10-CM

## 2022-08-09 DIAGNOSIS — I11.0 HYPERTENSIVE HEART DISEASE WITH HEART FAILURE: ICD-10-CM

## 2022-08-09 DIAGNOSIS — R79.1 ABNORMAL COAGULATION PROFILE: ICD-10-CM

## 2022-08-09 DIAGNOSIS — Z79.82 LONG TERM (CURRENT) USE OF ASPIRIN: ICD-10-CM

## 2022-08-09 DIAGNOSIS — E03.9 HYPOTHYROIDISM, UNSPECIFIED: ICD-10-CM

## 2022-08-09 DIAGNOSIS — I95.9 HYPOTENSION, UNSPECIFIED: ICD-10-CM

## 2022-08-09 DIAGNOSIS — G91.4 HYDROCEPHALUS IN DISEASES CLASSIFIED ELSEWHERE: ICD-10-CM

## 2022-08-09 DIAGNOSIS — T45.515A ADVERSE EFFECT OF ANTICOAGULANTS, INITIAL ENCOUNTER: ICD-10-CM

## 2023-02-16 NOTE — PHYSICAL THERAPY INITIAL EVALUATION ADULT - STANDING BALANCE: DYNAMIC, REHAB EVAL
For information on Fall & Injury Prevention, visit: [email or web address] OR  [email or web address] OR  https://www.cdc.gov/steadi/patient.html
poor balance

## 2025-07-03 NOTE — DISCHARGE NOTE PROVIDER - NSDCQMERRANDS_GEN_ALL_CORE
PROCEDURES:  Hemorrhoidectomy, internal and external, involving 2 or more anal columns 03-Jul-2025 08:34:21  Prieto Gunter X   Yes